# Patient Record
Sex: FEMALE | Race: WHITE | NOT HISPANIC OR LATINO
[De-identification: names, ages, dates, MRNs, and addresses within clinical notes are randomized per-mention and may not be internally consistent; named-entity substitution may affect disease eponyms.]

---

## 2019-04-01 ENCOUNTER — APPOINTMENT (OUTPATIENT)
Dept: NEUROSURGERY | Facility: CLINIC | Age: 64
End: 2019-04-01
Payer: COMMERCIAL

## 2019-04-01 VITALS — DIASTOLIC BLOOD PRESSURE: 99 MMHG | HEART RATE: 112 BPM | SYSTOLIC BLOOD PRESSURE: 162 MMHG

## 2019-04-01 VITALS
WEIGHT: 143 LBS | TEMPERATURE: 98.1 F | OXYGEN SATURATION: 93 % | HEART RATE: 103 BPM | SYSTOLIC BLOOD PRESSURE: 176 MMHG | HEIGHT: 64 IN | BODY MASS INDEX: 24.41 KG/M2 | DIASTOLIC BLOOD PRESSURE: 96 MMHG | RESPIRATION RATE: 16 BRPM

## 2019-04-01 DIAGNOSIS — Z87.891 PERSONAL HISTORY OF NICOTINE DEPENDENCE: ICD-10-CM

## 2019-04-01 DIAGNOSIS — Z85.118 PERSONAL HISTORY OF OTHER MALIGNANT NEOPLASM OF BRONCHUS AND LUNG: ICD-10-CM

## 2019-04-01 DIAGNOSIS — Z87.09 PERSONAL HISTORY OF OTHER DISEASES OF THE RESPIRATORY SYSTEM: ICD-10-CM

## 2019-04-01 PROCEDURE — 99205 OFFICE O/P NEW HI 60 MIN: CPT

## 2019-04-01 RX ORDER — FLUTICASONE FUROATE AND VILANTEROL TRIFENATATE 100; 25 UG/1; UG/1
100-25 POWDER RESPIRATORY (INHALATION)
Refills: 0 | Status: ACTIVE | COMMUNITY

## 2019-04-09 NOTE — HISTORY OF PRESENT ILLNESS
[de-identified] : 64 yo right handed female presents to the office for a neurosurgical consultation for an incidental cerebral aneurysm seen on recent MRI imaging. Pt has PMH of smoking 1/12-2 packs of cigarettes a day. PMH COPD and emphysema, Hep C with moderate cirrhosis, \par In May 2018 she was diagnosed with stage II lung cancer which was treated with chemo included 4 cycles, On May 7 2018, she underwent a right upper lobectomy by Dr. KEREN Clayton. Dr. Bray oncologist Joonjosefa\par In Feb 2019, pt had recent Chest ct which was good as stated by pt. \par June 2018 MRI brain was performed to r/o metastatic disease showed an incidental left MCA aneurysm measuring approximately 10 mm. \par PSH tonsillectomy at age 28.\par Allergies Penicillin\par Pt denies headaches, vision changes, motor and sensory disturbance. \par Pt denies family history of cerebral aneurysms.\par \par Plan: Formal cerebral angiogram Dr. Moreira\par Disk given to Adriana (MRI brain)  [FreeTextEntry1] : Incidental cerebral aneurysm

## 2019-04-09 NOTE — ASSESSMENT
[FreeTextEntry1] : 2019\par \par \par \par Re:	Bree Doss \par :	1955\par \par The patient is a 63-year-old right-handed female who one year ago was seeing her pulmonologist at [facility] secondary to her long history of smoking and COPD, and she had a lung screening CT scan which led to a PET scan.  This led to the finding of a right upper lobe mass.  She underwent a right thoracotomy in May 2018 at [facility] by Dr. Clayton, and this mass was removed.  Eighteen lymph nodes were negative.  She went on to be treated with 4 cycles of chemotherapy.  Recent CT scan of the chest from a month ago was clean.  She has returned to work \par 3 weeks after the thoracotomy and is quite active.  In 2018, one month after the thoracotomy, her oncologist at [facility] suggested that she have an MRI brain with and without IV contrast to look for metastatic disease.  This showed some ischemic change consistent with age and an approximately 10 mm left MCA aneurysm.  It is extremely apparent on the T2 axial images on the MRI.\par \par The patient’s past medical history is positive for an old history of hepatitis C with moderate cirrhosis.  She has hypertension and COPD.  Past surgical history is positive for the lung surgery, left wrist surgery, and tonsillectomy.  She is allergic to penicillin.  Family history is negative.  She is on medication for hypertension and a baby aspirin.  She was a 2-pack-per-day smoker for many years and quit at the time of the thoracotomy and she does not drink.  She is a .  She drives to work every day and is quite active.  Review of systems is negative.\par \par IMPRESSION: I am treating this patient as if her lung cancer is under control and she is oncologically stable.  She is 63 years old with a greater than 10 mm MCA aneurysm (I measured it at 11.6 mm) in the dominant sylvian fissure.  Prior to decision making, I am going to forgo a CTA or MRA and go directly to cerebral angiography to completely characterize this lesion, and I have contacted my colleague, Dr. Jaguar Moreira, to perform this study.  I explained to the patient she will most probably be a candidate for treatment, either open or endovascular.  I await the angiogram.  \par \par \par \par Yo Foss M.D., F.A.C.S.\par [facility]\par \par

## 2019-04-09 NOTE — PHYSICAL EXAM
[General Appearance - Alert] : alert [General Appearance - In No Acute Distress] : in no acute distress [Oriented To Time, Place, And Person] : oriented to person, place, and time [Impaired Insight] : insight and judgment were intact [Affect] : the affect was normal [Person] : oriented to person [Place] : oriented to place [Time] : oriented to time [Short Term Intact] : short term memory intact [Remote Intact] : remote memory intact [Span Intact] : the attention span was normal [Concentration Intact] : normal concentrating ability [Fluency] : fluency intact [Comprehension] : comprehension intact [Current Events] : adequate knowledge of current events [Past History] : adequate knowledge of personal past history [Vocabulary] : adequate range of vocabulary [Cranial Nerves Oculomotor (III)] : extraocular motion intact [Cranial Nerves Trigeminal (V)] : facial sensation intact symmetrically [Cranial Nerves Facial (VII)] : face symmetrical [Cranial Nerves Vestibulocochlear (VIII)] : hearing was intact bilaterally [Cranial Nerves Glossopharyngeal (IX)] : tongue and palate midline [Cranial Nerves Accessory (XI - Cranial And Spinal)] : head turning and shoulder shrug symmetric [Cranial Nerves Hypoglossal (XII)] : there was no tongue deviation with protrusion [Motor Tone] : muscle tone was normal in all four extremities [Motor Strength] : muscle strength was normal in all four extremities [No Muscle Atrophy] : normal bulk in all four extremities [Motor Handedness Right-Handed] : the patient is right hand dominant [Sensation Tactile Decrease] : light touch was intact [Balance] : balance was intact [Extraocular Movements] : extraocular movements were intact [Outer Ear] : the ears and nose were normal in appearance [Neck Appearance] : the appearance of the neck was normal [] : no respiratory distress [Respiration, Rhythm And Depth] : normal respiratory rhythm and effort [Exaggerated Use Of Accessory Muscles For Inspiration] : no accessory muscle use [Heart Rate And Rhythm] : heart rate was normal and rhythm regular [Abnormal Walk] : normal gait [Involuntary Movements] : no involuntary movements were seen [Skin Color & Pigmentation] : normal skin color and pigmentation [Skin Turgor] : normal skin turgor [Limited Balance] : balance was intact [Romberg's Sign] : Romberg's sign was negtive [Past-pointing] : there was no past-pointing [Tremor] : no tremor present [Coordination - Dysmetria Impaired Finger-to-Nose Bilateral] : not present [FreeTextEntry6] : no pronator drift

## 2019-04-15 ENCOUNTER — OUTPATIENT (OUTPATIENT)
Dept: OUTPATIENT SERVICES | Facility: HOSPITAL | Age: 64
LOS: 1 days | End: 2019-04-15
Payer: COMMERCIAL

## 2019-04-15 ENCOUNTER — APPOINTMENT (OUTPATIENT)
Dept: NEUROSURGERY | Facility: CLINIC | Age: 64
End: 2019-04-15
Payer: COMMERCIAL

## 2019-04-15 VITALS
HEART RATE: 93 BPM | SYSTOLIC BLOOD PRESSURE: 156 MMHG | HEIGHT: 64 IN | OXYGEN SATURATION: 98 % | RESPIRATION RATE: 15 BRPM | DIASTOLIC BLOOD PRESSURE: 92 MMHG | WEIGHT: 145.06 LBS | TEMPERATURE: 98 F

## 2019-04-15 VITALS
BODY MASS INDEX: 24.41 KG/M2 | DIASTOLIC BLOOD PRESSURE: 92 MMHG | SYSTOLIC BLOOD PRESSURE: 156 MMHG | OXYGEN SATURATION: 98 % | TEMPERATURE: 97.9 F | HEIGHT: 64 IN | RESPIRATION RATE: 17 BRPM | WEIGHT: 143 LBS | HEART RATE: 93 BPM

## 2019-04-15 DIAGNOSIS — I67.1 CEREBRAL ANEURYSM, NONRUPTURED: ICD-10-CM

## 2019-04-15 DIAGNOSIS — Z90.89 ACQUIRED ABSENCE OF OTHER ORGANS: Chronic | ICD-10-CM

## 2019-04-15 DIAGNOSIS — K74.60 UNSPECIFIED CIRRHOSIS OF LIVER: ICD-10-CM

## 2019-04-15 DIAGNOSIS — Z01.818 ENCOUNTER FOR OTHER PREPROCEDURAL EXAMINATION: ICD-10-CM

## 2019-04-15 DIAGNOSIS — Z90.2 ACQUIRED ABSENCE OF LUNG [PART OF]: Chronic | ICD-10-CM

## 2019-04-15 LAB
ALBUMIN SERPL ELPH-MCNC: 4.8 G/DL — SIGNIFICANT CHANGE UP (ref 3.3–5)
ALP SERPL-CCNC: 105 U/L — SIGNIFICANT CHANGE UP (ref 40–120)
ALT FLD-CCNC: 17 U/L — SIGNIFICANT CHANGE UP (ref 10–45)
ANION GAP SERPL CALC-SCNC: 15 MMOL/L — SIGNIFICANT CHANGE UP (ref 5–17)
APTT BLD: 28.9 SEC — SIGNIFICANT CHANGE UP (ref 27.5–36.3)
AST SERPL-CCNC: 20 U/L — SIGNIFICANT CHANGE UP (ref 10–40)
BILIRUB SERPL-MCNC: 0.2 MG/DL — SIGNIFICANT CHANGE UP (ref 0.2–1.2)
BLD GP AB SCN SERPL QL: NEGATIVE — SIGNIFICANT CHANGE UP
BUN SERPL-MCNC: 25 MG/DL — HIGH (ref 7–23)
CALCIUM SERPL-MCNC: 10.1 MG/DL — SIGNIFICANT CHANGE UP (ref 8.4–10.5)
CHLORIDE SERPL-SCNC: 105 MMOL/L — SIGNIFICANT CHANGE UP (ref 96–108)
CO2 SERPL-SCNC: 21 MMOL/L — LOW (ref 22–31)
CREAT SERPL-MCNC: 0.77 MG/DL — SIGNIFICANT CHANGE UP (ref 0.5–1.3)
GLUCOSE SERPL-MCNC: 95 MG/DL — SIGNIFICANT CHANGE UP (ref 70–99)
HCT VFR BLD CALC: 45.3 % — HIGH (ref 34.5–45)
HGB BLD-MCNC: 15.1 G/DL — SIGNIFICANT CHANGE UP (ref 11.5–15.5)
INR BLD: 0.93 RATIO — SIGNIFICANT CHANGE UP (ref 0.88–1.16)
MCHC RBC-ENTMCNC: 28.6 PG — SIGNIFICANT CHANGE UP (ref 27–34)
MCHC RBC-ENTMCNC: 33.3 GM/DL — SIGNIFICANT CHANGE UP (ref 32–36)
MCV RBC AUTO: 85.8 FL — SIGNIFICANT CHANGE UP (ref 80–100)
PLATELET # BLD AUTO: 305 K/UL — SIGNIFICANT CHANGE UP (ref 150–400)
POTASSIUM SERPL-MCNC: 3.8 MMOL/L — SIGNIFICANT CHANGE UP (ref 3.5–5.3)
POTASSIUM SERPL-SCNC: 3.8 MMOL/L — SIGNIFICANT CHANGE UP (ref 3.5–5.3)
PROT SERPL-MCNC: 7.6 G/DL — SIGNIFICANT CHANGE UP (ref 6–8.3)
PROTHROM AB SERPL-ACNC: 10.5 SEC — SIGNIFICANT CHANGE UP (ref 10–13.1)
RBC # BLD: 5.28 M/UL — HIGH (ref 3.8–5.2)
RBC # FLD: 13.9 % — SIGNIFICANT CHANGE UP (ref 10.3–14.5)
RH IG SCN BLD-IMP: POSITIVE — SIGNIFICANT CHANGE UP
SODIUM SERPL-SCNC: 141 MMOL/L — SIGNIFICANT CHANGE UP (ref 135–145)
WBC # BLD: 10.15 K/UL — SIGNIFICANT CHANGE UP (ref 3.8–10.5)
WBC # FLD AUTO: 10.15 K/UL — SIGNIFICANT CHANGE UP (ref 3.8–10.5)

## 2019-04-15 PROCEDURE — 86900 BLOOD TYPING SEROLOGIC ABO: CPT

## 2019-04-15 PROCEDURE — 99213 OFFICE O/P EST LOW 20 MIN: CPT

## 2019-04-15 PROCEDURE — 86850 RBC ANTIBODY SCREEN: CPT

## 2019-04-15 PROCEDURE — 80053 COMPREHEN METABOLIC PANEL: CPT

## 2019-04-15 PROCEDURE — 85610 PROTHROMBIN TIME: CPT

## 2019-04-15 PROCEDURE — 86901 BLOOD TYPING SEROLOGIC RH(D): CPT

## 2019-04-15 PROCEDURE — 85027 COMPLETE CBC AUTOMATED: CPT

## 2019-04-15 PROCEDURE — G0463: CPT

## 2019-04-15 PROCEDURE — 85730 THROMBOPLASTIN TIME PARTIAL: CPT

## 2019-04-15 NOTE — H&P PST ADULT - NSICDXPASTMEDICALHX_GEN_ALL_CORE_FT
PAST MEDICAL HISTORY:  Cerebral aneurysm left MCA    COPD with emphysema     Hepatitis C in remission for 12 years. Moderate damage done to Liver.      HTN (hypertension)     Liver cirrhosis     Lung cancer s/p chemo last summer, s/p RUL lobectomy 5/2018 at [facility] with Dr. Clayton

## 2019-04-15 NOTE — HISTORY OF PRESENT ILLNESS
[de-identified] : Bree Doss is a 63yr old female here for a visit prior to her diagnostic cerebral angiogram which will be done by Dr. Moreira 4/17/2019. In June 2018, one month after undergoing thoracotomy for lung mass, her oncologist at [facility] suggested that she have an MRI brain with and without IV contrast to look for metastatic disease. This MRI showed a cerebral aneurysm approximately 10 mm left MCA aneurysm. She has no family history of cerebral aneurysm. She has social history positive for formerly smoking. She was evaluated by Dr. Foss in the outpatient office 4/1/2019 and Dr. Moreira and him discussed proceeding with diagnostic cerebral angiography to fully map out the aneurysm.  Today she feels well denies any new motor/sensory/speech/visual abnormalities. \par

## 2019-04-15 NOTE — H&P PST ADULT - NSICDXPROBLEM_GEN_ALL_CORE_FT
PROBLEM DIAGNOSES  Problem: Cerebral aneurysm  Assessment and Plan: Scheduled cerebral angiogram   will c/w all medications as prescribed   inhalers will be used the day of procedure      Problem: Liver cirrhosis  Assessment and Plan: CMP, coags ordered (could not find any recent lab work)

## 2019-04-15 NOTE — ASSESSMENT
[FreeTextEntry1] : Impression: 63yr old female with 10mm lmca aneurysm on MR \par \par Plan: \par Discussed with patient and her son the details of the diagnostic cerebral angiogram procedure. Reviewed the risks and benefits of the procedure. Patient verbalized understanding and wishes to proceed with diagnostic cerebral angiogram 4/17/2019 with Dr. Jaguar Moreira. \par Educated patient and son on the signs and symptoms of subarachnoid hemorrhage and need to seek medical attention immediately if she were to experience the worst headache of her life.\par Discussed the goal of the diagnostic cerebral angiogram is to fully map out the aneurysm shape size and location so that we can better know how to potential treat the aneurysm.

## 2019-04-15 NOTE — PHYSICAL EXAM
[General Appearance - Alert] : alert [General Appearance - In No Acute Distress] : in no acute distress [Place] : oriented to place [Person] : oriented to person [Cranial Nerves Optic (II)] : visual acuity intact bilaterally,  pupils equal round and reactive to light [Cranial Nerves Oculomotor (III)] : extraocular motion intact [Time] : oriented to time [Cranial Nerves Glossopharyngeal (IX)] : tongue and palate midline [Cranial Nerves Trigeminal (V)] : facial sensation intact symmetrically [Cranial Nerves Facial (VII)] : face symmetrical [Cranial Nerves Vestibulocochlear (VIII)] : hearing was intact bilaterally [Cranial Nerves Accessory (XI - Cranial And Spinal)] : head turning and shoulder shrug symmetric [Cranial Nerves Hypoglossal (XII)] : there was no tongue deviation with protrusion [Involuntary Movements] : no involuntary movements were seen [Motor Strength] : muscle strength was normal in all four extremities [Abnormal Walk] : normal gait [] : no respiratory distress

## 2019-04-15 NOTE — H&P PST ADULT - ACTIVITY
feels SOB with humid weather; since she had her wedge resection strenuous activity will make her SOB

## 2019-04-16 PROBLEM — C34.90 MALIGNANT NEOPLASM OF UNSPECIFIED PART OF UNSPECIFIED BRONCHUS OR LUNG: Chronic | Status: ACTIVE | Noted: 2019-04-15

## 2019-04-17 ENCOUNTER — APPOINTMENT (OUTPATIENT)
Dept: NEUROSURGERY | Facility: HOSPITAL | Age: 64
End: 2019-04-17
Payer: COMMERCIAL

## 2019-04-17 ENCOUNTER — OUTPATIENT (OUTPATIENT)
Dept: OUTPATIENT SERVICES | Facility: HOSPITAL | Age: 64
LOS: 1 days | End: 2019-04-17
Payer: COMMERCIAL

## 2019-04-17 DIAGNOSIS — Z90.2 ACQUIRED ABSENCE OF LUNG [PART OF]: Chronic | ICD-10-CM

## 2019-04-17 DIAGNOSIS — I67.1 CEREBRAL ANEURYSM, NONRUPTURED: ICD-10-CM

## 2019-04-17 DIAGNOSIS — Z90.89 ACQUIRED ABSENCE OF OTHER ORGANS: Chronic | ICD-10-CM

## 2019-04-17 PROBLEM — I10 ESSENTIAL (PRIMARY) HYPERTENSION: Chronic | Status: ACTIVE | Noted: 2019-04-15

## 2019-04-17 PROCEDURE — C1894: CPT

## 2019-04-17 PROCEDURE — 36227 PLACE CATH XTRNL CAROTID: CPT | Mod: 50

## 2019-04-17 PROCEDURE — 36226 PLACE CATH VERTEBRAL ART: CPT

## 2019-04-17 PROCEDURE — 36226 PLACE CATH VERTEBRAL ART: CPT | Mod: 50

## 2019-04-17 PROCEDURE — 36227 PLACE CATH XTRNL CAROTID: CPT

## 2019-04-17 PROCEDURE — 76377 3D RENDER W/INTRP POSTPROCES: CPT | Mod: 26

## 2019-04-17 PROCEDURE — 36224 PLACE CATH CAROTD ART: CPT

## 2019-04-17 PROCEDURE — 36224 PLACE CATH CAROTD ART: CPT | Mod: 50

## 2019-04-17 PROCEDURE — C1769: CPT

## 2019-04-17 PROCEDURE — C1887: CPT

## 2019-04-17 RX ORDER — SODIUM CHLORIDE 9 MG/ML
1000 INJECTION INTRAMUSCULAR; INTRAVENOUS; SUBCUTANEOUS
Qty: 0 | Refills: 0 | Status: DISCONTINUED | OUTPATIENT
Start: 2019-04-17 | End: 2019-05-02

## 2019-04-17 NOTE — CHART NOTE - NSCHARTNOTEFT_GEN_A_CORE
Interventional Neuro Radiology  Pre-Procedure Note PA-C    This is a 63 year old right hand dominant female            Allergies: penicillins (Rash)  PMHX: left MCA aneurysm , hypertension, cirrhosis, Lung cancer: s/p chemo last summer, Hepatitis C, COPD with emphysema  PSHX: right upper lobectomy of lung 2018   Social History: former smoker quit 8 years ago   FAMILY HISTORY:  Current Medications:     CBC                        15.1   10.15 )-----------( 305      ( 15 Apr 2019 20:56 )             45.3       BMP    141  |  105  |  25<H>  ----------------------------<  95  3.8   |  21<L>  |  0.77      Blood Bank: 0 positive available       Assessment/Plan:   This is a 63 year old right hand dominant female    Procedure, goals, risks, benefits and alternatives were discussed with patient and patient's family. All questions were answered.  Risks include but are not limited to stroke, vessel injury, hemorrhage, and or right  groin hematoma.  Patient demonstrates understanding  of all risks involved with this procedure and wishes to continue. Appropriate content was obtained from patient and consent is in the patient's chart. Interventional Neuro Radiology  Pre-Procedure Note PA-C    This is a 63 year old right hand dominant female with a past medical history significant for hypertension, COPD, lung cancer s/post right upper lobectomy in 2018, HCV, cirrhosis with a 10mm left MCA aneurysm.    Allergies: penicillins (Rash)  PMHX: left MCA aneurysm , hypertension, cirrhosis, Lung cancer: s/p chemo last summer, Hepatitis C, COPD with emphysema  PSHX: right upper lobectomy of lung 2018   Social History: former smoker quit 8 years ago   FAMILY HISTORY:  Current Medications:     CBC                        15.1   10.15 )-----------( 305      ( 15 Apr 2019 20:56 )             45.3       BMP    141  |  105  |  25<H>  ----------------------------<  95  3.8   |  21<L>  |  0.77      Blood Bank: 0 positive available       Assessment/Plan:   This is a 63 year old right hand dominant female with a metastatic   Procedure, goals, risks, benefits and alternatives were discussed with patient and patient's family. All questions were answered.  Risks include but are not limited to stroke, vessel injury, hemorrhage, and or right  groin hematoma.  Patient demonstrates understanding  of all risks involved with this procedure and wishes to continue. Appropriate content was obtained from patient and consent is in the patient's chart. Interventional Neuro Radiology  Pre-Procedure Note PA-C    This is a 63 year old right hand dominant female with a past medical history significant for hypertension, COPD, lung cancer s/post right upper lobectomy in 2018, HCV, cirrhosis with a 10mm left MCA aneurysm.    Allergies: penicillins (Rash)  PMHX: left MCA aneurysm , hypertension, cirrhosis, Lung cancer: s/p chemo last summer, Hepatitis C, COPD with emphysema  PSHX: right upper lobectomy of lung 2018   Social History: former smoker quit 8 years ago   FAMILY HISTORY: non-contributory       CBC                        15.1   10.15 )-----------( 305      ( 15 Apr 2019 20:56 )             45.3       BMP    141  |  105  |  25<H>  ----------------------------<  95  3.8   |  21<L>  |  0.77      Blood Bank: 0 positive available       Assessment/Plan:   This is a 63 year old right hand dominant female with a metastatic work up which revealed a left MCA aneurysm, patient presents to Neuro IR for a selective cerebral angiography to monitor aneurysm. Procedure, goals, risks, benefits and alternatives were discussed with patient and patient's family. All questions were answered.  Risks include but are not limited to stroke, vessel injury, hemorrhage, and or right  groin hematoma.  Patient demonstrates understanding  of all risks involved with this procedure and wishes to continue. Appropriate content was obtained from patient and consent is in the patient's chart.

## 2019-04-17 NOTE — CHART NOTE - NSCHARTNOTEFT_GEN_A_CORE
Interventional Neuro- Radiology   Procedure Note PA-C    Procedure: Selective Cerebral Angiography   Pre- Procedure Diagnosis:  Post- Procedure Diagnosis:    : Dr. Jaguar Moreira  Resident:  Dr Uriel Sharma         Physician Assistant: HEATHER Meza PA-C    Nurse:  Radiologic tech:  Anesthesiologist:  Sheath:    I/Os: EBL less than 10cc  IV fluids:     cc     Urine output     cc        Contrast Omnipaque 240      cc             Vitals: BP         HR      Spo2        Preliminary Report:  Using a 4 Fr short/long sheath to the right groin under MAC sedation via   left vertebral artery,  left common carotid artery, left external carotid artery, right vertebral artery,  right common carotid artery, right external carotid artery  a selective cerebral angiography was performed and  demonstrated                      Official note to follow).  Patient tolerated procedure well, hemodynamically stable, no change in neurological status compared to baseline.  Results discussed with neurosurgery, patient and patient's  family.  Right groin sheath was removed, manual compression held to hemostasis for 20 minutes, no active bleeding, no hematoma, quick clot and safeguard balloon dressing applied at _____h. Interventional Neuro- Radiology   Procedure Note PA-C    Procedure: Selective Cerebral Angiography   Pre- Procedure Diagnosis:  left MCA aneurysm   Post- Procedure Diagnosis: left MCA aneurysm 10mm with a 2.49mm neck     : Dr. Jaguar Moreira  Resident:  Dr Gregg Sharma                        Physician Assistant: HEATHER Meza PA-C    Nurse:                     Nya Tran RN                          Radiologic tech:   Wes Poe LRT       Anum Whittington LRT               Anesthesiologist:  Dr Miah Goldstein                      Sheath:                5 Slovak long sheath     I/Os: EBL less than 10cc  IV fluids: 100cc  Urine output due to void Contrast Omnipaque 240   87cc            Vitals: /77        HR 71     Spo2  100%       Preliminary Report:  Using a 5 Slovak long sheath to the right groin under MAC sedation via left vertebral artery, left internal carotid artery, left external carotid artery, right vertebral artery, right internal carotid artery, right external carotid artery a selective cerebral angiography was performed and demonstrated a left MCA measuring approximately 10mm, with a 2.49 neck. Official note to follow.  Patient tolerated procedure well, hemodynamically stable, no change in neurological status compared to baseline. Results discussed with  patient and patient's . Right groin sheath was removed, manual compression held to hemostasis for 20 minutes, no active bleeding, no hematoma, quick clot and safeguard balloon dressing applied at 11:30am. Interventional Neuro- Radiology   Procedure Note PA-C    Procedure: Selective Cerebral Angiography   Pre- Procedure Diagnosis:  left MCA aneurysm   Post- Procedure Diagnosis: left MCA aneurysm 10mm with a 2.49mm neck     : Dr. Jaguar Moreira  Resident:  Dr Gregg Sharma                        Physician Assistant: HEATHER Meza PA-C    Nurse:                  Nya rTan RN                          Radiologic tech:   Wes Poe LRT        Anum Whittington LRT               Anesthesiologist:  Dr Miah Goldstein                      Sheath:                5 Montenegrin long sheath     I/Os: EBL less than 10cc  IV fluids: 100cc  Urine output due to void Contrast Omnipaque 240   87cc            Vitals: /77        HR 71     Spo2  100%       Preliminary Report:  Using a 5 Montenegrin long sheath to the right groin under MAC sedation via left vertebral artery, left internal carotid artery, left external carotid artery, right vertebral artery, right internal carotid artery, right external carotid artery a selective cerebral angiography was performed and demonstrated a left MCA measuring approximately 10mm, with a 2.49 neck. Official note to follow.  Patient tolerated procedure well, hemodynamically stable, no change in neurological status compared to baseline. Results discussed with  patient and patient's . Right groin sheath was removed, manual compression held to hemostasis for 20 minutes, no active bleeding, no hematoma, quick clot and safeguard balloon dressing applied at 11:30am. Interventional Neuro- Radiology   Procedure Note PA-C    Procedure: Selective Cerebral Angiography   Pre- Procedure Diagnosis:  left MCA aneurysm   Post- Procedure Diagnosis: left MCA aneurysm 10mm with a 2.49mm neck     : Dr. Jaguar Moreira  Resident:  Dr Gregg Sharma                        Physician Assistant: HEATHER Meza PA-C    Nurse:                  Nya Tran RN                          Radiologic tech:   Wes Poe LRT         Anum Whittington LRT               Anesthesiologist:  Dr Miah Anton CRNA                     Sheath:                5 Italian long sheath     I/Os: EBL less than 10cc  IV fluids: 100cc  Urine output due to void Contrast Omni 240  87cc            Vitals: /77        HR 71     Spo2  100%       Preliminary Report:  Using a 5 Italian long sheath to the right groin under MAC sedation via left vertebral artery, left internal carotid artery, left external carotid artery, right vertebral artery, right internal carotid artery, right external carotid artery a selective cerebral angiography was performed and demonstrated a left MCA measuring approximately 10mm, with a 2.49 neck. Official note to follow.  Patient tolerated procedure well, hemodynamically stable, no change in neurological status compared to baseline. Results discussed with  patient and patient's . Right groin sheath was removed, manual compression held to hemostasis for 20 minutes, no active bleeding, no hematoma, quick clot and safeguard balloon dressing applied at 11:30am.

## 2019-04-23 DIAGNOSIS — I67.1 CEREBRAL ANEURYSM, NONRUPTURED: ICD-10-CM

## 2019-05-09 ENCOUNTER — APPOINTMENT (OUTPATIENT)
Dept: NEUROSURGERY | Facility: CLINIC | Age: 64
End: 2019-05-09
Payer: COMMERCIAL

## 2019-05-09 VITALS
TEMPERATURE: 97.9 F | OXYGEN SATURATION: 93 % | SYSTOLIC BLOOD PRESSURE: 154 MMHG | WEIGHT: 143 LBS | HEART RATE: 93 BPM | RESPIRATION RATE: 17 BRPM | HEIGHT: 64 IN | BODY MASS INDEX: 24.41 KG/M2 | DIASTOLIC BLOOD PRESSURE: 95 MMHG

## 2019-05-09 PROCEDURE — 99213 OFFICE O/P EST LOW 20 MIN: CPT

## 2019-05-09 NOTE — PHYSICAL EXAM
[General Appearance - Alert] : alert [Place] : oriented to place [General Appearance - In No Acute Distress] : in no acute distress [Person] : oriented to person [Time] : oriented to time [Cranial Nerves Optic (II)] : visual acuity intact bilaterally,  pupils equal round and reactive to light [Cranial Nerves Oculomotor (III)] : extraocular motion intact [Cranial Nerves Trigeminal (V)] : facial sensation intact symmetrically [Cranial Nerves Facial (VII)] : face symmetrical [Cranial Nerves Glossopharyngeal (IX)] : tongue and palate midline [Cranial Nerves Hypoglossal (XII)] : there was no tongue deviation with protrusion [Cranial Nerves Accessory (XI - Cranial And Spinal)] : head turning and shoulder shrug symmetric [Cranial Nerves Vestibulocochlear (VIII)] : hearing was intact bilaterally [Involuntary Movements] : no involuntary movements were seen [Motor Strength] : muscle strength was normal in all four extremities [] : no respiratory distress [Exaggerated Use Of Accessory Muscles For Inspiration] : no accessory muscle use [Abnormal Walk] : normal gait

## 2019-05-09 NOTE — REASON FOR VISIT
[Follow-Up: _____] : a [unfilled] follow-up visit [Spouse] : spouse [FreeTextEntry1] : Bree Doss is a 63yr old female here for a follow up visit after undergoing diagnostic cerebral angiography 4/17/2019.  The angiogram was significant for a 9mm left middle cerebral artery aneurysm unruptured. She tolerated the procedure well and is here to discuss treatment options. Today she feels well has no complaints. \par \par PCP: Dr. Jonas\par \par HPI: Bree Doss is a 63yr old female here for a visit prior to her diagnostic cerebral angiogram which will be done by Dr. Moreira 4/17/2019. In June 2018, one month after undergoing thoracotomy for lung mass, her oncologist at [facility] suggested that she have an MRI brain with and without IV contrast to look for metastatic disease. This MRI showed a cerebral aneurysm approximately 10 mm left MCA aneurysm. She has no family history of cerebral aneurysm. She has social history positive for formerly smoking. She was evaluated by Dr. Foss in the outpatient office 4/1/2019 and Dr. Moreira and him discussed proceeding with diagnostic cerebral angiography to fully map out the aneurysm. Today she feels well denies any new motor/sensory/speech/visual abnormalities.

## 2019-05-09 NOTE — ASSESSMENT
[FreeTextEntry1] : Impression: 63yr old female with 9mm left middle cerebral artery aneurysm \par \par Plan: \par Discussed results of the diagnostic cerebral angiogram which confirms the presence of a 9mm LMCA aneurysm \par Discussed treatment options such as endovascular embolization with the WEB device \par WIll start patient on aspirin 325mg and Plavix 75mg daily 6 days prior to the embolization procedure\par The risks, benefits, alternatives, complications and personnel associated with the procedure were discussed with the patient and the family in great detail.  They request that we proceed.\par Medical and cardiac clearance prior to the procedure

## 2019-05-09 NOTE — CONSULT LETTER
[Dear  ___] : Dear  [unfilled], [DrJourdan  ___] : Dr. ALFREDO [Consult Letter:] : I had the pleasure of evaluating your patient, [unfilled]. [Consult Closing:] : Thank you very much for allowing me to participate in the care of this patient.  If you have any questions, please do not hesitate to contact me. [FreeTextEntry1] : As you know she is a 63yr old female with a past medical history of COPD, Hep C with moderate cirrhosis, and lung cancer.  She underwent MRI of the brain to evaluate for metastatic disease and it was significant for an unruptured 10mm left middle cerebral artery aneurysm. Her social history is significant for former smoker and her family history consists of parents having a stroke but unsure if subarachnoid hemorrhage was present. She underwent diagnostic cerebral angiography 4/17/2019 which confirmed the presence of a 9mm left middle cerebral artery aneurysm. Due to the size and location of this aneurysm I recommend treatment.  I discussed endovascular treatment of the aneurysm with the WEB device and potential a stent. The risks, benefits, alternatives, complications and personnel associated with the procedure were discussed with the patient and the family in great detail.  They request that we proceed.  She will be started on dual antiplatelet therapy six days prior to the embolization procedure and PRU level will be drawn the day of the procedure. \par  [FreeTextEntry3] : \par Sincerely,\par \par Jaguar Moreira MD\par Professor of Neurological Surgery and Radiology\par  Department of Neurosurgery\par Director Cerebrovascular Surgery\par [facility] [employer] at [facility]\par

## 2019-06-13 ENCOUNTER — OUTPATIENT (OUTPATIENT)
Dept: OUTPATIENT SERVICES | Facility: HOSPITAL | Age: 64
LOS: 1 days | End: 2019-06-13
Payer: COMMERCIAL

## 2019-06-13 VITALS
RESPIRATION RATE: 16 BRPM | OXYGEN SATURATION: 98 % | HEART RATE: 88 BPM | SYSTOLIC BLOOD PRESSURE: 127 MMHG | DIASTOLIC BLOOD PRESSURE: 77 MMHG | HEIGHT: 64 IN | TEMPERATURE: 98 F | WEIGHT: 151.9 LBS

## 2019-06-13 DIAGNOSIS — Z87.39 PERSONAL HISTORY OF OTHER DISEASES OF THE MUSCULOSKELETAL SYSTEM AND CONNECTIVE TISSUE: Chronic | ICD-10-CM

## 2019-06-13 DIAGNOSIS — I67.1 CEREBRAL ANEURYSM, NONRUPTURED: ICD-10-CM

## 2019-06-13 DIAGNOSIS — Z98.890 OTHER SPECIFIED POSTPROCEDURAL STATES: Chronic | ICD-10-CM

## 2019-06-13 DIAGNOSIS — Z90.89 ACQUIRED ABSENCE OF OTHER ORGANS: Chronic | ICD-10-CM

## 2019-06-13 DIAGNOSIS — Z01.818 ENCOUNTER FOR OTHER PREPROCEDURAL EXAMINATION: ICD-10-CM

## 2019-06-13 DIAGNOSIS — Z90.2 ACQUIRED ABSENCE OF LUNG [PART OF]: Chronic | ICD-10-CM

## 2019-06-13 LAB
ANION GAP SERPL CALC-SCNC: 20 MMOL/L — HIGH (ref 5–17)
BLD GP AB SCN SERPL QL: NEGATIVE — SIGNIFICANT CHANGE UP
BUN SERPL-MCNC: 16 MG/DL — SIGNIFICANT CHANGE UP (ref 7–23)
CALCIUM SERPL-MCNC: 10.1 MG/DL — SIGNIFICANT CHANGE UP (ref 8.4–10.5)
CHLORIDE SERPL-SCNC: 97 MMOL/L — SIGNIFICANT CHANGE UP (ref 96–108)
CO2 SERPL-SCNC: 22 MMOL/L — SIGNIFICANT CHANGE UP (ref 22–31)
CREAT SERPL-MCNC: 0.9 MG/DL — SIGNIFICANT CHANGE UP (ref 0.5–1.3)
GLUCOSE SERPL-MCNC: 171 MG/DL — HIGH (ref 70–99)
POTASSIUM SERPL-MCNC: 3.3 MMOL/L — LOW (ref 3.5–5.3)
POTASSIUM SERPL-SCNC: 3.3 MMOL/L — LOW (ref 3.5–5.3)
RH IG SCN BLD-IMP: POSITIVE — SIGNIFICANT CHANGE UP
SODIUM SERPL-SCNC: 139 MMOL/L — SIGNIFICANT CHANGE UP (ref 135–145)

## 2019-06-13 PROCEDURE — 86900 BLOOD TYPING SEROLOGIC ABO: CPT

## 2019-06-13 PROCEDURE — G0463: CPT

## 2019-06-13 PROCEDURE — 86850 RBC ANTIBODY SCREEN: CPT

## 2019-06-13 PROCEDURE — 80048 BASIC METABOLIC PNL TOTAL CA: CPT

## 2019-06-13 PROCEDURE — 85027 COMPLETE CBC AUTOMATED: CPT

## 2019-06-13 PROCEDURE — 86901 BLOOD TYPING SEROLOGIC RH(D): CPT

## 2019-06-13 NOTE — H&P PST ADULT - OTHER CARE PROVIDERS
Dr. Osorio (pulm) [phone]   Dr. Bray (oncology) [phone] cardiologist Dr. Bolanos [phone] has appointment on 9/17/19 Dr. Osorio (pulm) [phone]   Dr. Bray (oncology) [phone] cardiologist Dr. Bolanos [phone] has appointment on 6/17/19

## 2019-06-13 NOTE — H&P PST ADULT - NSICDXPASTSURGICALHX_GEN_ALL_CORE_FT
PAST SURGICAL HISTORY:  H/O ganglion cyst left wrist 24 yr ago    History of eye surgery as a baby , cyst under eyelid    History of liver biopsy 20 yr ago    S/P lobectomy of lung right upper lobe  5/2018    S/P tonsillectomy

## 2019-06-13 NOTE — H&P PST ADULT - NSICDXPASTMEDICALHX_GEN_ALL_CORE_FT
PAST MEDICAL HISTORY:  Cerebral aneurysm left MCA    COPD with emphysema     Hepatitis C in remission for 12 years. Moderate damage done to Liver.      HTN (hypertension)     Liver cirrhosis     Lung cancer s/p chemo last summer, s/p RUL lobectomy 5/2018 at [place] with Dr. Clayton PAST MEDICAL HISTORY:  Cerebral aneurysm left MCA 9mm    COPD with emphysema stable, denies hosptializations, ER visits    Hepatitis C in remission for 12 years. Moderate damage done to Liver.      HTN (hypertension)     Liver cirrhosis stable    Lung cancer s/p chemo last summer, s/p RUL lobectomy 5/2018 at [place] with Dr. Clayton PAST MEDICAL HISTORY:  Cerebral aneurysm left MCA 9mm  s/p cerebral angio 4/2019    COPD with emphysema stable, denies hospitalizations/ ER visits    Hepatitis C in remission for 12 years. Moderate damage done to Liver.      HTN (hypertension)     Liver cirrhosis stable    Lung cancer s/p chemo last summer, s/p RUL lobectomy 5/2018 at [place] with Dr. Clayton

## 2019-06-13 NOTE — H&P PST ADULT - NSICDXPROBLEM_GEN_ALL_CORE_FT
PROBLEM DIAGNOSES  Problem: Cerebral aneurysm  Assessment and Plan: cerebral embolization with web device  PST instructions provided, patient verbalized understanding.   CBC, BMP, T&S collected and sent.   As per Dr. Moreira, patient will start Plavix 6 days prior procedure , will continue Aspirin. PROBLEM DIAGNOSES  Problem: Cerebral aneurysm  Assessment and Plan: cerebral embolization with web device  PST instructions provided, patient verbalized understanding.   CBC, BMP, T&S collected and sent.   As per Dr. Moreira, patient will start Plavix 6 days prior procedure , will increase Aspirin to 325mg daily.

## 2019-06-13 NOTE — H&P PST ADULT - NEGATIVE CARDIOVASCULAR SYMPTOMS
no chest pain/no paroxysmal nocturnal dyspnea/no peripheral edema/no dyspnea on exertion/no claudication/no orthopnea/no palpitations

## 2019-06-13 NOTE — H&P PST ADULT - HISTORY OF PRESENT ILLNESS
64 y/o female with pmhx of HTN, COPD, lung CA s/p RUL lobectomy in 5/2018 with chemo from June-August 2018, HCV treated but now with liver cirrhosis, s/p f/u MRI for metastatic disease which found a 10mm left MCA aneurysm.   Pt is asymptomatic. Now presents for cerebral angiogram. 62 y/o female with pmhx of HTN, COPD, lung CA s/p RUL lobectomy in 5/2018 with chemo from June-August 2018, HCV treated but now with liver cirrhosis, s/p f/u MRI for metastatic disease which found a 9mm left MCA aneurysm, underwent diagnostic  cerebral angiogram 4/17/19, presents to [facility] for scheduled cerebral embolization with web device on 6/21/19. Denies headache, vision changes,  neuro deficits, no fever, chills, no acute complaints. Accompanied by son .     Patient advised by Dr. Moreira to start Plavix 6 days prior procedure, will continue Aspirin. 62 y/o female with pmhx of HTN, COPD, lung CA s/p RUL lobectomy in 5/2018 with chemo from June-August 2018, HCV treated but now with liver cirrhosis, s/p f/u MRI for metastatic disease which found a 9mm left MCA aneurysm, underwent diagnostic  cerebral angiogram 4/17/19, presents to [facility] for scheduled cerebral embolization with web device on 6/21/19. Denies headache, vision changes,  neuro deficits, no fever, chills, no acute complaints. Accompanied by son .     Patient advised by Dr. Moreira to start Plavix 6 days prior procedure, will continue Aspirin, but increase to 325mg daily.

## 2019-06-14 LAB
HCT VFR BLD CALC: 47.4 % — HIGH (ref 34.5–45)
HGB BLD-MCNC: 15.5 G/DL — SIGNIFICANT CHANGE UP (ref 11.5–15.5)
MCHC RBC-ENTMCNC: 29.5 PG — SIGNIFICANT CHANGE UP (ref 27–34)
MCHC RBC-ENTMCNC: 32.7 GM/DL — SIGNIFICANT CHANGE UP (ref 32–36)
MCV RBC AUTO: 90.1 FL — SIGNIFICANT CHANGE UP (ref 80–100)
PLATELET # BLD AUTO: 327 K/UL — SIGNIFICANT CHANGE UP (ref 150–400)
RBC # BLD: 5.26 M/UL — HIGH (ref 3.8–5.2)
RBC # FLD: 13.9 % — SIGNIFICANT CHANGE UP (ref 10.3–14.5)
WBC # BLD: 10.46 K/UL — SIGNIFICANT CHANGE UP (ref 3.8–10.5)
WBC # FLD AUTO: 10.46 K/UL — SIGNIFICANT CHANGE UP (ref 3.8–10.5)

## 2019-06-21 ENCOUNTER — TRANSCRIPTION ENCOUNTER (OUTPATIENT)
Age: 64
End: 2019-06-21

## 2019-06-21 ENCOUNTER — APPOINTMENT (OUTPATIENT)
Dept: NEUROSURGERY | Facility: HOSPITAL | Age: 64
End: 2019-06-21
Payer: COMMERCIAL

## 2019-06-21 ENCOUNTER — INPATIENT (INPATIENT)
Facility: HOSPITAL | Age: 64
LOS: 2 days | Discharge: HOME | DRG: 26 | End: 2019-06-24
Attending: NEUROLOGICAL SURGERY | Admitting: NEUROLOGICAL SURGERY
Payer: COMMERCIAL

## 2019-06-21 VITALS
RESPIRATION RATE: 12 BRPM | OXYGEN SATURATION: 96 % | WEIGHT: 162.7 LBS | TEMPERATURE: 98 F | HEART RATE: 93 BPM | HEIGHT: 64 IN

## 2019-06-21 DIAGNOSIS — Z90.2 ACQUIRED ABSENCE OF LUNG [PART OF]: Chronic | ICD-10-CM

## 2019-06-21 DIAGNOSIS — Z98.890 OTHER SPECIFIED POSTPROCEDURAL STATES: Chronic | ICD-10-CM

## 2019-06-21 DIAGNOSIS — I67.1 CEREBRAL ANEURYSM, NONRUPTURED: ICD-10-CM

## 2019-06-21 DIAGNOSIS — Z87.39 PERSONAL HISTORY OF OTHER DISEASES OF THE MUSCULOSKELETAL SYSTEM AND CONNECTIVE TISSUE: Chronic | ICD-10-CM

## 2019-06-21 DIAGNOSIS — Z90.89 ACQUIRED ABSENCE OF OTHER ORGANS: Chronic | ICD-10-CM

## 2019-06-21 LAB
ANION GAP SERPL CALC-SCNC: 14 MMOL/L — SIGNIFICANT CHANGE UP (ref 5–17)
APTT BLD: 29.9 SEC — SIGNIFICANT CHANGE UP (ref 27.5–36.3)
BUN SERPL-MCNC: 16 MG/DL — SIGNIFICANT CHANGE UP (ref 7–23)
CALCIUM SERPL-MCNC: 8.7 MG/DL — SIGNIFICANT CHANGE UP (ref 8.4–10.5)
CHLORIDE SERPL-SCNC: 106 MMOL/L — SIGNIFICANT CHANGE UP (ref 96–108)
CO2 SERPL-SCNC: 19 MMOL/L — LOW (ref 22–31)
CREAT SERPL-MCNC: 0.74 MG/DL — SIGNIFICANT CHANGE UP (ref 0.5–1.3)
GLUCOSE SERPL-MCNC: 162 MG/DL — HIGH (ref 70–99)
HCT VFR BLD CALC: 39.5 % — SIGNIFICANT CHANGE UP (ref 34.5–45)
HGB BLD-MCNC: 14.1 G/DL — SIGNIFICANT CHANGE UP (ref 11.5–15.5)
INR BLD: 1.04 RATIO — SIGNIFICANT CHANGE UP (ref 0.88–1.16)
MAGNESIUM SERPL-MCNC: 2 MG/DL — SIGNIFICANT CHANGE UP (ref 1.6–2.6)
MCHC RBC-ENTMCNC: 31.5 PG — SIGNIFICANT CHANGE UP (ref 27–34)
MCHC RBC-ENTMCNC: 35.6 GM/DL — SIGNIFICANT CHANGE UP (ref 32–36)
MCV RBC AUTO: 88.3 FL — SIGNIFICANT CHANGE UP (ref 80–100)
PA ADP PRP-ACNC: 34 PRU — LOW (ref 194–417)
PHOSPHATE SERPL-MCNC: 3.6 MG/DL — SIGNIFICANT CHANGE UP (ref 2.5–4.5)
PLATELET # BLD AUTO: 234 K/UL — SIGNIFICANT CHANGE UP (ref 150–400)
POTASSIUM SERPL-MCNC: 4.1 MMOL/L — SIGNIFICANT CHANGE UP (ref 3.5–5.3)
POTASSIUM SERPL-SCNC: 4.1 MMOL/L — SIGNIFICANT CHANGE UP (ref 3.5–5.3)
PROTHROM AB SERPL-ACNC: 11.9 SEC — SIGNIFICANT CHANGE UP (ref 10–12.9)
RBC # BLD: 4.47 M/UL — SIGNIFICANT CHANGE UP (ref 3.8–5.2)
RBC # FLD: 12.9 % — SIGNIFICANT CHANGE UP (ref 10.3–14.5)
SODIUM SERPL-SCNC: 139 MMOL/L — SIGNIFICANT CHANGE UP (ref 135–145)
WBC # BLD: 6.6 K/UL — SIGNIFICANT CHANGE UP (ref 3.8–10.5)
WBC # FLD AUTO: 6.6 K/UL — SIGNIFICANT CHANGE UP (ref 3.8–10.5)

## 2019-06-21 PROCEDURE — 70496 CT ANGIOGRAPHY HEAD: CPT | Mod: 26

## 2019-06-21 PROCEDURE — 61624 TCAT PERM OCCLS/EMBOLJ CNS: CPT

## 2019-06-21 PROCEDURE — 93970 EXTREMITY STUDY: CPT | Mod: 26

## 2019-06-21 PROCEDURE — 36228 PLACE CATH INTRACRANIAL ART: CPT | Mod: LT

## 2019-06-21 PROCEDURE — 36224 PLACE CATH CAROTD ART: CPT | Mod: LT

## 2019-06-21 PROCEDURE — 99291 CRITICAL CARE FIRST HOUR: CPT

## 2019-06-21 PROCEDURE — 75894 X-RAYS TRANSCATH THERAPY: CPT | Mod: 26

## 2019-06-21 PROCEDURE — 75898 FOLLOW-UP ANGIOGRAPHY: CPT | Mod: 26

## 2019-06-21 PROCEDURE — 99292 CRITICAL CARE ADDL 30 MIN: CPT

## 2019-06-21 RX ORDER — DOCUSATE SODIUM 100 MG
100 CAPSULE ORAL
Refills: 0 | Status: DISCONTINUED | OUTPATIENT
Start: 2019-06-21 | End: 2019-06-24

## 2019-06-21 RX ORDER — IPRATROPIUM/ALBUTEROL SULFATE 18-103MCG
3 AEROSOL WITH ADAPTER (GRAM) INHALATION EVERY 6 HOURS
Refills: 0 | Status: DISCONTINUED | OUTPATIENT
Start: 2019-06-21 | End: 2019-06-24

## 2019-06-21 RX ORDER — SENNA PLUS 8.6 MG/1
1 TABLET ORAL DAILY
Refills: 0 | Status: DISCONTINUED | OUTPATIENT
Start: 2019-06-21 | End: 2019-06-24

## 2019-06-21 RX ORDER — ASPIRIN/CALCIUM CARB/MAGNESIUM 324 MG
325 TABLET ORAL DAILY
Refills: 0 | Status: DISCONTINUED | OUTPATIENT
Start: 2019-06-22 | End: 2019-06-24

## 2019-06-21 RX ORDER — CHLORHEXIDINE GLUCONATE 213 G/1000ML
1 SOLUTION TOPICAL
Refills: 0 | Status: DISCONTINUED | OUTPATIENT
Start: 2019-06-21 | End: 2019-06-24

## 2019-06-21 RX ORDER — SODIUM CHLORIDE 9 MG/ML
1000 INJECTION INTRAMUSCULAR; INTRAVENOUS; SUBCUTANEOUS
Refills: 0 | Status: DISCONTINUED | OUTPATIENT
Start: 2019-06-21 | End: 2019-06-22

## 2019-06-21 RX ORDER — ACETAMINOPHEN 500 MG
650 TABLET ORAL EVERY 6 HOURS
Refills: 0 | Status: DISCONTINUED | OUTPATIENT
Start: 2019-06-21 | End: 2019-06-24

## 2019-06-21 RX ADMIN — CHLORHEXIDINE GLUCONATE 1 APPLICATION(S): 213 SOLUTION TOPICAL at 22:12

## 2019-06-21 NOTE — PROGRESS NOTE ADULT - SUBJECTIVE AND OBJECTIVE BOX
HPI: 64 yo female hx of known L MCA aneurysm, COPD, lobectomy, presents for elective angiography/WEB embolization of L MCA aneurysm.    On admission, the patient was:  GCS: 15  Hunt-Quiñones:  modified Beck:  ICH score:  NIHSS:    VITALS:  Reviewed.    LABS:  Post-op pending.    IMAGING:   Recent imaging studies were reviewed.    MEDICATIONS:  acetaminophen   Tablet .. 650 milliGRAM(s) Oral every 6 hours PRN  ALBUTerol/ipratropium for Nebulization 3 milliLiter(s) Nebulizer every 6 hours PRN  chlorhexidine 4% Liquid 1 Application(s) Topical <User Schedule>  docusate sodium 100 milliGRAM(s) Oral two times a day  senna 1 Tablet(s) Oral daily  sodium chloride 0.9%. 1000 milliLiter(s) IV Continuous <Continuous>    EXAMINATION:  General:  calm  HEENT:  MMM  Neuro:  awake, alert, oriented x 3, follows commands, moves all extremities  Cards:  RRR  Respiratory:  no respiratory distress  Adomen:  soft  Extremities:  no edema, old R groin site ecchymosis  Skin:  warm/dry

## 2019-06-21 NOTE — CHART NOTE - NSCHARTNOTEFT_GEN_A_CORE
Interventional Neuro- Radiology   Procedure Note PA-C    Procedure: Selective Cerebral Angiography   Pre- Procedure Diagnosis: Left middle cerebral artery aneurysm   Post- Procedure Diagnosis:    : Dr. Jaguar Moreira MD  Fellow: Dr. Prachi Fontenot  Resident: Dr. Uriel Ko  Physician Assistant: Viry Forbes    RN: Essence    Anesthesia: Dr. Deven Washington (general anesthesia)    Sheath: 5 Trinidadian fabuki                                                                                                                                                                                                                                                                                                                                                                                                                                             I/Os:  Estimated blood loss: less than 10cc        IV fluids:     cc     Urine output     cc        Contrast Omnipaque 240      cc         Antibiotics:    Vitals:  BP: 99/57    HR: 62      Spo2: 98%     Preliminary Report:    Using a 5 Trinidadian fabuki sheath to the right groin under MAC sedation via   left vertebral artery,  left common carotid artery, left external carotid artery, right vertebral artery,  right common carotid artery, right external carotid artery  a selective cerebral angiography was performed and  demonstrates ________. ( Official note to follow).  Patient tolerated procedure well, hemodynamically stable, no change in neurological status compared to baseline.  Results discussed with neurosurgery, patient and patient's  family.  Groin sheath was removed,  manual compression held to hemostasis  for  21 minutes, no active bleeding, no hematoma,   quick clot and safeguard balloon dressing applied at _____h.  STAT labs:  CBC BMP  ____h.  Patient transfered to Recovery Room Interventional Neuro- Radiology   Procedure Note PA-C    Procedure: Selective Cerebral Angiography   Pre- Procedure Diagnosis: Left middle cerebral artery aneurysm   Post- Procedure Diagnosis: Embolization of left middle cerebral artery aneurysm with WEB device    : Dr. Jaguar Moreira  Fellow: Dr. Prachi Fontenot  Resident: Dr. Uriel Ko  Physician Assistant: Viry Forbes  RN: Essence  Anesthesia: Dr. Sam Washington (general anesthesia)    Sheath: 5 [place]oran fabuki                                                                                                                                                                                                                                                                                                                                                                                                                                             I/Os:  Estimated blood loss: less than 10cc        IV fluids:     cc     Urine output     cc        Contrast Omnipaque 240      cc           Antibiotics: Clindamycin  Meds: Heparin 4000 units    Vitals:  BP: 99/57    HR: 62      Spo2: 98%     Preliminary Report:  Using a 5 [place]an fabuki sheath to the right groin under general anesthesia via left internal carotid artery a selective cerebral angiogram with deployment of WEB device into the left middle cerebral artery aneurysm was performed and  demonstrates embolization of the aneurysm. ( Official note to follow).  Patient tolerated procedure well, hemodynamically stable, no change in neurological status compared to baseline.  Results discussed with patient and patient's  family.  Groin sheath was removed,  manual compression held to hemostasis  for  21 minutes, star close device deployed,  quick clot and safeguard balloon dressing applied at 1700h.  Patient transferred to Neurosurgical Intensive Care Unit Interventional Neuro- Radiology   Procedure Note PA-C    Procedure: Selective Cerebral Angiography   Pre- Procedure Diagnosis: Left middle cerebral artery aneurysm   Post- Procedure Diagnosis: Embolization of left middle cerebral artery aneurysm with WEB device    : Dr. Jaguar Moreira  Fellow: Dr. Prachi Fontenot  Resident: Dr. Uriel Ko  Physician Assistant: Viry Forbes  RN: Essence  Anesthesia: Dr. Sam Washington (general anesthesia)    Sheath: 5 Beninese fabuki                                                                                                                                                                                                                                                                                                                                                                                                                                             I/Os:  Estimated blood loss: less than 10cc        IV fluids: 700cc     Urine output: 800cc        Contrast Omnipaque 240: 169cc           Antibiotics: Clindamycin  Meds: Heparin 4000 units    Vitals:  BP: 99/57    HR: 62      Spo2: 98%     Preliminary Report:  Using a 5 Beninese fabuki sheath to the right groin under general anesthesia via left internal carotid artery a selective cerebral angiogram with deployment of WEB device into the left middle cerebral artery aneurysm was performed and  demonstrates embolization of the aneurysm. ( Official note to follow).  Patient tolerated procedure well, hemodynamically stable, no change in neurological status compared to baseline.  Results discussed with patient and patient's  family.  Groin sheath was removed,  manual compression held to hemostasis  for  21 minutes, star close device deployed,  quick clot and safeguard balloon dressing applied at 1700h.  Patient transferred to Neurosurgical Intensive Care Unit

## 2019-06-21 NOTE — CHART NOTE - NSCHARTNOTEFT_GEN_A_CORE
CAPRINI SCORE [CLOT] Score on Admission for     AGE RELATED RISK FACTORS                                                       MOBILITY RELATED FACTORS  [ ] Age 41-60 years                                            (1 Point)                  [ ] Bed rest                                                        (1 Point)  [ x] Age: 61-74 years                                           (2 Points)                 [ ] Plaster cast                                                   (2 Points)  [ ] Age= 75 years                                              (3 Points)                 [ ] Bed bound for more than 72 hours                 (2 Points)    DISEASE RELATED RISK FACTORS                                               GENDER SPECIFIC FACTORS  [ ] Edema in the lower extremities                       (1 Point)                  [ ] Pregnancy                                                     (1 Point)  [ ] Varicose veins                                               (1 Point)                  [ ] Post-partum < 6 weeks                                   (1 Point)             [ ] BMI > 25 Kg/m2                                            (1 Point)                  [ ] Hormonal therapy  or oral contraception          (1 Point)                 [ ] Sepsis (in the previous month)                        (1 Point)                  [ ] History of pregnancy complications                 (1 point)  [ ] Pneumonia or serious lung disease                                               [ ] Unexplained or recurrent                     (1 Point)           (in the previous month)                               (1 Point)  [ ] Abnormal pulmonary function test                     (1 Point)                 SURGERY RELATED RISK FACTORS (include planned surgeries)  [ ] Acute myocardial infarction                              (1 Point)                 [ ]  Section                                             (1 Point)  [ ] Congestive heart failure (in the previous month)  (1 Point)               [ ] Minor surgery                                                  (1 Point)   [ ] Inflammatory bowel disease                             (1 Point)                 [ ] Arthroscopic surgery                                        (2 Points)  [ ] Central venous access                                      (2 Points)                [x ] General surgery lasting more than 45 minutes   (2 Points)       [ ] Stroke (in the previous month)                          (5 Points)               [ ] Elective arthroplasty                                         (5 Points)            [ x] Current or past malignancy                                (2 Points)                                                                                                     HEMATOLOGY RELATED FACTORS                                                 TRAUMA RELATED RISK FACTORS  [ ] Prior episodes of VTE                                     (3 Points)                [ ] Fracture of the hip, pelvis, or leg                       (5 Points)  [ ] Positive family history for VTE                         (3 Points)                 [ ] Acute spinal cord injury (in the previous month)  (5 Points)  [ ] Prothrombin 55954 A                                     (3 Points)                 [ ] Paralysis  (less than 1 month)                             (5 Points)  [ ] Factor V Leiden                                             (3 Points)                  [ ] Multiple Trauma within 1 month                        (5 Points)  [ ] Lupus anticoagulants                                     (3 Points)                                                           [ ] Anticardiolipin antibodies                               (3 Points)                                                       [ ] High homocysteine in the blood                      (3 Points)                                             [ ] Other congenital or acquired thrombophilia      (3 Points)                                                [ ] Heparin induced thrombocytopenia                  (3 Points)                                          Total Score [    6      ]    Risk:  Very low 0   Low 1 to 2   Moderate 3 to 4   High =5       VTE Prophylasix Recommednations:  [x ] mechanical pneumatic compression devices                                      [ ] contraindicated: _____________________  [ ] chemo prophylasix                                                                                   [x ] contraindicated __patient is day 0 s/p angio embo of LMCA aneurysm___________________    **** HIGH LIKELIHOOD DVT PRESENT ON ADMISSION  [ x] (please order LE dopplers within 24 hours of admission)

## 2019-06-21 NOTE — DISCHARGE NOTE NURSING/CASE MANAGEMENT/SOCIAL WORK - NSDCPEEMAIL_GEN_ALL_CORE
[facility] for Tobacco Control email [email or web address]

## 2019-06-21 NOTE — DISCHARGE NOTE NURSING/CASE MANAGEMENT/SOCIAL WORK - NSDCDPATPORTLINK_GEN_ALL_CORE
You can access the [facility] Portal, offered by [facility], by registering with the following website: [email or web address]

## 2019-06-21 NOTE — CHART NOTE - NSCHARTNOTEFT_GEN_A_CORE
Interventional Neuro Radiology  Pre-Procedure Note    This is a 64yo right hand dominant female with pmhx of HTN, COPD, lung CA s/p RUL lobectomy in 5/2018 with chemo from June-August 2018, HCV treated but now with liver cirrhosis, s/p f/u MRI for metastatic disease which found a 9mm left MCA aneurysm, underwent diagnostic  cerebral angiogram 4/17/19. Patient presents today to neuro IR for selective cerebral angiography and embolization of aneurysm with WEB device.      Neuro Exam: Awake and alert, oriented x3, fluent, normal naming and repetition, follows 3 step commands. Extraocular movements intact, no nystagmus, visual fields full, face symmetric, tongue midline. No drift, 5/5 power x 4 extremities. Normal sensation to LT. Normal finger-to-nose and rapid alternating movements.    PAST MEDICAL & SURGICAL HISTORY:  Cerebral aneurysm: left MCA 9mm  s/p diagnostic cerebral angio 4/2019  Liver cirrhosis: stable  HTN (hypertension)  Lung cancer: s/p chemo last summer, s/p RUL lobectomy 5/2018 at [place] with Dr. Clayton  Hepatitis C: in remission for 12 years. Moderate damage done to Liver.  COPD with emphysema: stable, denies hospitalizations/ ER visits  History of eye surgery: as a baby, cyst under eyelid  H/O ganglion cyst: left wrist 24 yr ago  History of liver biopsy: 20 yr ago  S/P lobectomy of lung: right upper lobe  5/2018  S/P tonsillectomy    Allergies:   penicillins (Rash)    Current Medications:   · 	Breo Ellipta 100 mcg-25 mcg/inh inhalation powder: Last Dose Taken:  , 1 puff(s) inhaled once a day  · 	ProAir HFA 90 mcg/inh inhalation aerosol: Last Dose Taken:  , 2 puff(s) inhaled 4 times a day, As Needed  · 	Incruse Ellipta 62.5 mcg/inh inhalation powder: Last Dose Taken:  , 1 puff(s) inhaled once a day  · 	triamterene-hydrochlorothiazide 37.5 mg-25 mg oral tablet: Last Dose Taken:  , 1 tab(s) orally once a day  · 	aspirin 325 mg oral tablet: Last Dose Taken:  , 1 tab(s) orally once a day (at bedtime) will continue   · 	clopidogrel 75 mg oral tablet: Last Dose Taken:  , 1 tab(s) orally once a day, start 6 days prior procedure 6/15/19.    Labs:     Seen and reviewed in sunrise.     P2Y12= 34    Blood Bank: blood available     Assessment/Plan:   This is a 64yo female presents with left MCA aneurysm. Patient presents to neuro-IR for selective cerebral angiography and WEB embolization. Procedure/ risks/ benefits/ goals/ alternatives were explained. Risks include but are not limited to stroke/ vessel injury/ hemorrhage/ groin hematoma. All questions answered. Informed content obtained from patient. Consent placed in chart.    Viry Forbes PA-C  [phone]

## 2019-06-21 NOTE — PROGRESS NOTE ADULT - SUBJECTIVE AND OBJECTIVE BOX
s/p WEB for LMCA aneurysm; PMH HTN, COPD, lung CA s/p R lobectomy  post op r groin bleeding, pressure applied, re-dressed, neuroIR aware    Vitals/labs/meds reviewed  alert, fully oriented, EOMI, PERRL, FS, BUE no drift 5/5, LLE 5/5   R DF/PF 5/5;   R groin, no hematoma, +ecchymoses    ASA  MR/MRA in am  resume home COPD inhalers  -160  monitor R groin closely  regular diet  bowel regimen    additional critical care time 40min

## 2019-06-21 NOTE — DISCHARGE NOTE NURSING/CASE MANAGEMENT/SOCIAL WORK - NSDCPEWEB_GEN_ALL_CORE
[place] website --- [email or web address] Tobacco [email or web address] website --- [email or web address]

## 2019-06-22 PROBLEM — K74.60 UNSPECIFIED CIRRHOSIS OF LIVER: Chronic | Status: ACTIVE | Noted: 2019-04-15

## 2019-06-22 LAB
ANION GAP SERPL CALC-SCNC: 14 MMOL/L — SIGNIFICANT CHANGE UP (ref 5–17)
BUN SERPL-MCNC: 17 MG/DL — SIGNIFICANT CHANGE UP (ref 7–23)
CALCIUM SERPL-MCNC: 9.2 MG/DL — SIGNIFICANT CHANGE UP (ref 8.4–10.5)
CHLORIDE SERPL-SCNC: 106 MMOL/L — SIGNIFICANT CHANGE UP (ref 96–108)
CO2 SERPL-SCNC: 21 MMOL/L — LOW (ref 22–31)
CREAT SERPL-MCNC: 1.1 MG/DL — SIGNIFICANT CHANGE UP (ref 0.5–1.3)
GLUCOSE SERPL-MCNC: 130 MG/DL — HIGH (ref 70–99)
HCT VFR BLD CALC: 37.8 % — SIGNIFICANT CHANGE UP (ref 34.5–45)
HGB BLD-MCNC: 13.3 G/DL — SIGNIFICANT CHANGE UP (ref 11.5–15.5)
MAGNESIUM SERPL-MCNC: 2.3 MG/DL — SIGNIFICANT CHANGE UP (ref 1.6–2.6)
MCHC RBC-ENTMCNC: 31.2 PG — SIGNIFICANT CHANGE UP (ref 27–34)
MCHC RBC-ENTMCNC: 35.2 GM/DL — SIGNIFICANT CHANGE UP (ref 32–36)
MCV RBC AUTO: 88.6 FL — SIGNIFICANT CHANGE UP (ref 80–100)
PHOSPHATE SERPL-MCNC: 3.1 MG/DL — SIGNIFICANT CHANGE UP (ref 2.5–4.5)
PLATELET # BLD AUTO: 266 K/UL — SIGNIFICANT CHANGE UP (ref 150–400)
POTASSIUM SERPL-MCNC: 3.8 MMOL/L — SIGNIFICANT CHANGE UP (ref 3.5–5.3)
POTASSIUM SERPL-SCNC: 3.8 MMOL/L — SIGNIFICANT CHANGE UP (ref 3.5–5.3)
RBC # BLD: 4.27 M/UL — SIGNIFICANT CHANGE UP (ref 3.8–5.2)
RBC # FLD: 12.9 % — SIGNIFICANT CHANGE UP (ref 10.3–14.5)
SODIUM SERPL-SCNC: 141 MMOL/L — SIGNIFICANT CHANGE UP (ref 135–145)
WBC # BLD: 11.6 K/UL — HIGH (ref 3.8–10.5)
WBC # FLD AUTO: 11.6 K/UL — HIGH (ref 3.8–10.5)

## 2019-06-22 PROCEDURE — 70551 MRI BRAIN STEM W/O DYE: CPT | Mod: 26

## 2019-06-22 PROCEDURE — 99291 CRITICAL CARE FIRST HOUR: CPT

## 2019-06-22 RX ADMIN — Medication 100 MILLIGRAM(S): at 05:30

## 2019-06-22 RX ADMIN — SENNA PLUS 1 TABLET(S): 8.6 TABLET ORAL at 11:19

## 2019-06-22 RX ADMIN — Medication 100 MILLIGRAM(S): at 17:29

## 2019-06-22 RX ADMIN — CHLORHEXIDINE GLUCONATE 1 APPLICATION(S): 213 SOLUTION TOPICAL at 21:52

## 2019-06-22 RX ADMIN — Medication 650 MILLIGRAM(S): at 10:28

## 2019-06-22 RX ADMIN — Medication 650 MILLIGRAM(S): at 10:58

## 2019-06-22 RX ADMIN — Medication 325 MILLIGRAM(S): at 11:19

## 2019-06-22 NOTE — PROVIDER CONTACT NOTE (OTHER) - ASSESSMENT
Patient has difficulty forming sentences and finding words. Patient A/O x 4. No drifts on any extremities. Patient appears anxious.

## 2019-06-22 NOTE — PROGRESS NOTE ADULT - SUBJECTIVE AND OBJECTIVE BOX
HPI: 64 yo female s/p elective web embo of L MCA aneurysm    OVERNIGHT EVENTS:   ?R facial droop and word finding difficulty, transient, urgent MRI this AM    VITALS:  T(C): , Max: 36.7 (06-21-19 @ 19:00)  HR:  (69 - 96)  BP: --  ABP:  (105/45 - 145/62)  RR:  (11 - 21)  SpO2:  (92% - 98%)  Wt(kg): --      06-21-19 @ 07:01  -  06-22-19 @ 07:00  --------------------------------------------------------  IN: 1500 mL / OUT: 1440 mL / NET: 60 mL      LABS:  Na: 139 (06-21 @ 21:42)  K: 4.1 (06-21 @ 21:42)  Cl: 106 (06-21 @ 21:42)  CO2: 19 (06-21 @ 21:42)  BUN: 16 (06-21 @ 21:42)  Cr: 0.74 (06-21 @ 21:42)  Glu: 162(06-21 @ 21:42)    Hgb: 14.1 (06-21 @ 21:42)  Hct: 39.5 (06-21 @ 21:42)  WBC: 6.6 (06-21 @ 21:42)  Plt: 234 (06-21 @ 21:42)    INR: 1.04 06-21-19 @ 21:42  PTT: 29.9 06-21-19 @ 21:42    IMAGING:   Recent imaging studies were reviewed.    MEDICATIONS:  acetaminophen   Tablet .. 650 milliGRAM(s) Oral every 6 hours PRN  ALBUTerol/ipratropium for Nebulization 3 milliLiter(s) Nebulizer every 6 hours PRN  aspirin 325 milliGRAM(s) Oral daily  chlorhexidine 4% Liquid 1 Application(s) Topical <User Schedule>  docusate sodium 100 milliGRAM(s) Oral two times a day  senna 1 Tablet(s) Oral daily    EXAMINATION:  General:  calm  HEENT:  MMM  Neuro:  awake, alert, oriented x 3, follows commands, moves all extremities, mild expressive aphasia and slight clumsiness of fine motor movements in R hand  Cards:  RRR  Respiratory:  no respiratory distress  Adomen:  soft  Extremities:  no edema  Skin:  warm/dry

## 2019-06-22 NOTE — PROGRESS NOTE ADULT - SUBJECTIVE AND OBJECTIVE BOX
s/p WEB for LMCA aneurysm; PMH HTN, COPD, lung CA s/p R lobectomy  post op r groin bleeding, pressure applied, re-dressed, neuroIR aware    Vitals/labs/meds reviewed  alert, fully oriented, mild expressive aphasia, EOMI, PERRL, FS, BUE no drift 5/5, BLE 5/5   R groin, no hematoma, +ecchymoses    ASA  MR/MRA done  resume home COPD inhalers  -160  monitor R groin closely  regular diet  bowel regimen s/p WEB for LMCA aneurysm; PMH HTN, COPD, lung CA s/p R lobectomy  r groin no further bleeding    Vitals/labs/meds reviewed  alert, fully oriented, mild expressive aphasia, EOMI, PERRL, FS, BUE no drift 5/5, BLE 5/5   R groin, no hematoma, +ecchymoses    ASA  MR/MRA done  resume home COPD inhalers  -160  monitor R groin closely  regular diet  bowel regimen

## 2019-06-22 NOTE — PROGRESS NOTE ADULT - SUBJECTIVE AND OBJECTIVE BOX
Visit Summary: Patient seen and evaluated at bedside.    Overnight Events: none    Exam:  T(C): 36.6 (06-21-19 @ 23:00), Max: 36.7 (06-21-19 @ 19:00)  HR: 81 (06-21-19 @ 23:00) (69 - 93)  BP: --  RR: 13 (06-21-19 @ 23:00) (12 - 16)  SpO2: 98% (06-21-19 @ 23:00) (94% - 98%)  Wt(kg): --    AOx3, FC, PERRL, EOMI, no facial   5/5 throughout, no drift  SILT  no clonus    Groin CDI                        14.1   6.6   )-----------( 234      ( 21 Jun 2019 21:42 )             39.5     06-21    139  |  106  |  16  ----------------------------<  162<H>  4.1   |  19<L>  |  0.74    Ca    8.7      21 Jun 2019 21:42  Phos  3.6     06-21  Mg     2.0     06-21    PT/INR - ( 21 Jun 2019 21:42 )   PT: 11.9 sec;   INR: 1.04 ratio         PTT - ( 21 Jun 2019 21:42 )  PTT:29.9 sec

## 2019-06-23 LAB
ANION GAP SERPL CALC-SCNC: 15 MMOL/L — SIGNIFICANT CHANGE UP (ref 5–17)
BUN SERPL-MCNC: 20 MG/DL — SIGNIFICANT CHANGE UP (ref 7–23)
CALCIUM SERPL-MCNC: 9.8 MG/DL — SIGNIFICANT CHANGE UP (ref 8.4–10.5)
CHLORIDE SERPL-SCNC: 103 MMOL/L — SIGNIFICANT CHANGE UP (ref 96–108)
CO2 SERPL-SCNC: 22 MMOL/L — SIGNIFICANT CHANGE UP (ref 22–31)
CREAT SERPL-MCNC: 0.81 MG/DL — SIGNIFICANT CHANGE UP (ref 0.5–1.3)
GLUCOSE SERPL-MCNC: 116 MG/DL — HIGH (ref 70–99)
MAGNESIUM SERPL-MCNC: 2.3 MG/DL — SIGNIFICANT CHANGE UP (ref 1.6–2.6)
PHOSPHATE SERPL-MCNC: 3.5 MG/DL — SIGNIFICANT CHANGE UP (ref 2.5–4.5)
POTASSIUM SERPL-MCNC: 4.3 MMOL/L — SIGNIFICANT CHANGE UP (ref 3.5–5.3)
POTASSIUM SERPL-SCNC: 4.3 MMOL/L — SIGNIFICANT CHANGE UP (ref 3.5–5.3)
SODIUM SERPL-SCNC: 140 MMOL/L — SIGNIFICANT CHANGE UP (ref 135–145)

## 2019-06-23 PROCEDURE — 99233 SBSQ HOSP IP/OBS HIGH 50: CPT

## 2019-06-23 RX ORDER — POTASSIUM CHLORIDE 20 MEQ
40 PACKET (EA) ORAL ONCE
Refills: 0 | Status: COMPLETED | OUTPATIENT
Start: 2019-06-23 | End: 2019-06-23

## 2019-06-23 RX ORDER — ENOXAPARIN SODIUM 100 MG/ML
40 INJECTION SUBCUTANEOUS
Refills: 0 | Status: DISCONTINUED | OUTPATIENT
Start: 2019-06-23 | End: 2019-06-24

## 2019-06-23 RX ADMIN — Medication 100 MILLIGRAM(S): at 17:01

## 2019-06-23 RX ADMIN — CHLORHEXIDINE GLUCONATE 1 APPLICATION(S): 213 SOLUTION TOPICAL at 21:47

## 2019-06-23 RX ADMIN — Medication 40 MILLIEQUIVALENT(S): at 10:30

## 2019-06-23 RX ADMIN — SENNA PLUS 1 TABLET(S): 8.6 TABLET ORAL at 10:31

## 2019-06-23 RX ADMIN — ENOXAPARIN SODIUM 40 MILLIGRAM(S): 100 INJECTION SUBCUTANEOUS at 17:01

## 2019-06-23 RX ADMIN — Medication 100 MILLIGRAM(S): at 05:41

## 2019-06-23 RX ADMIN — Medication 325 MILLIGRAM(S): at 10:30

## 2019-06-23 NOTE — PROGRESS NOTE ADULT - SUBJECTIVE AND OBJECTIVE BOX
HPI: 64 yo female s/p WEB embolization of L MCA aneurysm    OVERNIGHT EVENTS:   No acute events overnight.    VITALS:  T(C): , Max: 37.6 (06-22-19 @ 23:00)  HR:  (65 - 103)  BP:  (122/71 - 152/80)  ABP:  (120/86 - 162/71)  RR:  (11 - 24)  SpO2:  (93% - 99%)  Wt(kg): --      06-22-19 @ 07:01  -  06-23-19 @ 07:00  --------------------------------------------------------  IN: 1320 mL / OUT: 2435 mL / NET: -1115 mL    06-23-19 @ 07:01  -  06-23-19 @ 10:05  --------------------------------------------------------  IN: 240 mL / OUT: 150 mL / NET: 90 mL      LABS:  Na: 141 (06-22 @ 22:17), 139 (06-21 @ 21:42)  K: 3.8 (06-22 @ 22:17), 4.1 (06-21 @ 21:42)  Cl: 106 (06-22 @ 22:17), 106 (06-21 @ 21:42)  CO2: 21 (06-22 @ 22:17), 19 (06-21 @ 21:42)  BUN: 17 (06-22 @ 22:17), 16 (06-21 @ 21:42)  Cr: 1.10 (06-22 @ 22:17), 0.74 (06-21 @ 21:42)  Glu: 130(06-22 @ 22:17), 162(06-21 @ 21:42)    Hgb: 13.3 (06-22 @ 22:17), 14.1 (06-21 @ 21:42)  Hct: 37.8 (06-22 @ 22:17), 39.5 (06-21 @ 21:42)  WBC: 11.6 (06-22 @ 22:17), 6.6 (06-21 @ 21:42)  Plt: 266 (06-22 @ 22:17), 234 (06-21 @ 21:42)    INR: 1.04 06-21-19 @ 21:42  PTT: 29.9 06-21-19 @ 21:42    IMAGING:   Recent imaging studies were reviewed.    MEDICATIONS:  acetaminophen   Tablet .. 650 milliGRAM(s) Oral every 6 hours PRN  ALBUTerol/ipratropium for Nebulization 3 milliLiter(s) Nebulizer every 6 hours PRN  aspirin 325 milliGRAM(s) Oral daily  chlorhexidine 4% Liquid 1 Application(s) Topical <User Schedule>  docusate sodium 100 milliGRAM(s) Oral two times a day  senna 1 Tablet(s) Oral daily    EXAMINATION:  General:  calm  HEENT:  MMM  Neuro:  awake, alert, oriented x 3, follows commands, moves all extremities, trace dysarthria and expressive aphasia much improved today  Cards:  RRR  Respiratory:  no respiratory distress  Adomen:  soft  Extremities:  no edema  Skin:  warm/dry

## 2019-06-23 NOTE — PROGRESS NOTE ADULT - SUBJECTIVE AND OBJECTIVE BOX
s/p WEB for LMCA aneurysm; PMH HTN, COPD, lung CA s/p R lobectomy  r groin no further bleeding    Vitals/labs/meds reviewed  alert, fully oriented, mild expressive aphasia improving, EOMI, PERRL, FS, BUE no drift 5/5, BLE 5/5     ASA  MR/MRA done  resume home COPD inhalers  -160  monitor R groin closely  regular diet  bowel regimen  pending transfer to floor

## 2019-06-23 NOTE — PHYSICAL THERAPY INITIAL EVALUATION ADULT - PLANNED THERAPY INTERVENTIONS, PT EVAL
balance training/stairs: GOAL: Pt will negotiate 9 steps of stairs using appropriate assistive device and 1 handrail via reciprocal/step-to technique indep in 2 weeks./gait training/bed mobility training

## 2019-06-23 NOTE — PHYSICAL THERAPY INITIAL EVALUATION ADULT - BALANCE TRAINING, PT EVAL
GOAL: Pt will demonstrate improved static/dynamic balance to normal in order to improve stability, decrease fall risk, and increase independence in ADLs within 2 weeks.

## 2019-06-23 NOTE — PROGRESS NOTE ADULT - SUBJECTIVE AND OBJECTIVE BOX
Visit Summary: Patient seen and evaluated at bedside.    Overnight Events: none    Exam:  T(C): 36.6 (06-21-19 @ 23:00), Max: 36.7 (06-21-19 @ 19:00)  HR: 81 (06-21-19 @ 23:00) (69 - 93)  BP: --  RR: 13 (06-21-19 @ 23:00) (12 - 16)  SpO2: 98% (06-21-19 @ 23:00) (94% - 98%)  Wt(kg): --    AOx3, FC, PERRL, EOMI, no facial, mild expressive aphasia  5/5 throughout, no drift  SILT  no clonus    Groin CDI                        14.1   6.6   )-----------( 234      ( 21 Jun 2019 21:42 )             39.5     06-21    139  |  106  |  16  ----------------------------<  162<H>  4.1   |  19<L>  |  0.74    Ca    8.7      21 Jun 2019 21:42  Phos  3.6     06-21  Mg     2.0     06-21    PT/INR - ( 21 Jun 2019 21:42 )   PT: 11.9 sec;   INR: 1.04 ratio         PTT - ( 21 Jun 2019 21:42 )  PTT:29.9 sec

## 2019-06-23 NOTE — PHYSICAL THERAPY INITIAL EVALUATION ADULT - ADDITIONAL COMMENTS
Pt lives in an apartment, 4 steps to enter, +handrail; 1 flight to 2nd floor, +handrail. Pt does not own any DMEs.    BLE DOPPLERS 6/21: No left lower extremity venous thrombosis; No venous thrombosis in the imaged segments of the right lower extremity venous system. MR OF HEAD 6/22:  Artifact from embolic material described above.

## 2019-06-23 NOTE — PHYSICAL THERAPY INITIAL EVALUATION ADULT - PERTINENT HX OF CURRENT PROBLEM, REHAB EVAL
63F with pmhx of HTN, COPD, lung CA s/p RUL lobectomy in 5/2018 with chemo from June-August 2018, HCV treated but now with liver cirrhosis, s/p f/u MRI for metastatic disease which found a 9mm left MCA aneurysm, underwent diagnostic cerebral angiogram. Denies headache, vision changes,  neuro deficits, no fever, chills, no acute complaints. S/P above procedure

## 2019-06-24 ENCOUNTER — TRANSCRIPTION ENCOUNTER (OUTPATIENT)
Age: 64
End: 2019-06-24

## 2019-06-24 VITALS — DIASTOLIC BLOOD PRESSURE: 75 MMHG | HEART RATE: 104 BPM | SYSTOLIC BLOOD PRESSURE: 151 MMHG | OXYGEN SATURATION: 94 %

## 2019-06-24 PROCEDURE — 99233 SBSQ HOSP IP/OBS HIGH 50: CPT

## 2019-06-24 PROCEDURE — 99239 HOSP IP/OBS DSCHRG MGMT >30: CPT

## 2019-06-24 RX ORDER — SENNA PLUS 8.6 MG/1
1 TABLET ORAL
Qty: 0 | Refills: 0 | DISCHARGE
Start: 2019-06-24

## 2019-06-24 RX ORDER — CLOPIDOGREL BISULFATE 75 MG/1
1 TABLET, FILM COATED ORAL
Qty: 0 | Refills: 0 | DISCHARGE

## 2019-06-24 RX ORDER — ASPIRIN/CALCIUM CARB/MAGNESIUM 324 MG
1 TABLET ORAL
Qty: 0 | Refills: 0 | DISCHARGE
Start: 2019-06-24

## 2019-06-24 RX ORDER — DOCUSATE SODIUM 100 MG
1 CAPSULE ORAL
Qty: 0 | Refills: 0 | DISCHARGE
Start: 2019-06-24

## 2019-06-24 RX ORDER — ACETAMINOPHEN 500 MG
2 TABLET ORAL
Qty: 0 | Refills: 0 | DISCHARGE
Start: 2019-06-24

## 2019-06-24 RX ORDER — ASPIRIN/CALCIUM CARB/MAGNESIUM 324 MG
1 TABLET ORAL
Qty: 0 | Refills: 0 | DISCHARGE

## 2019-06-24 RX ADMIN — Medication 325 MILLIGRAM(S): at 12:07

## 2019-06-24 RX ADMIN — SENNA PLUS 1 TABLET(S): 8.6 TABLET ORAL at 12:07

## 2019-06-24 RX ADMIN — Medication 100 MILLIGRAM(S): at 05:57

## 2019-06-24 NOTE — PROGRESS NOTE ADULT - ASSESSMENT
63F s/p WEB embolization of aneurysm    - -160  - Cont ASA  - Transfer to floor
63F s/p WEB embolization of aneurysm    - -160  - Cont ASA  - Transfer to floor
63F s/p WEB embolization of aneurysm  - MRI / MRA in AM  - -160  - Cont ASA  - q1hr neurochecks
Summary: 62 yo female s/p WEB embolization of L MCA aneurysm    NEURO:  q4h neuro checks    CARDS:  -160    PULM:  sat > 89%; home COPD meds    RENAL:  IVL    GASTRO:  regular diet  ---> Stress ulcer prophylaxis:  PPI    HEME:  monitor H/H;   ---> DVT prophylaxis: SCDs, SQL    ENDO:  euglycemia    ID:  afebrile    Code status:  Full code  Disposition:  Floor    Patient was not critically ill, but was medically complex.  30 minutes spent.
Summary: 64 yo female s/p WEB embolization of L MCA aneurysm    NEURO:  q1h neuro checks; CT brain in AM    CARDS:  -160    PULM:  sat > 89%; home COPD meds    RENAL:  IVL    GASTRO:  advance as tolerated  ---> Stress ulcer prophylaxis:  PPI    HEME:  monitor H/H;  now per NSG    ---> DVT prophylaxis: SCDs, hold anticoagulation, fresh post-op    ENDO:  euglycemia    ID:  afebrile    Code status:  Full code  Disposition:  ICU    This patient was at high risk of neurologic deterioration and/or death due to: aneurysm rupture    Time spent:  45 minutes
Summary: 64 yo female s/p WEB embolization of L MCA aneurysm    NEURO:  q1h neuro checks; stat MRI performed and pending    CARDS:  -160    PULM:  sat > 89%; home COPD meds    RENAL:  IVL    GASTRO:  regular diet  ---> Stress ulcer prophylaxis:  PPI    HEME:  monitor H/H;  now per NSG    ---> DVT prophylaxis: SCDs, hold anticoagulation, fresh post-op    ENDO:  euglycemia    ID:  afebrile    Code status:  Full code  Disposition:  ICU    This patient was at high risk of neurologic deterioration and/or death due to: aneurysm rupture    Time spent:  45 minutes
Summary: 64 yo female s/p WEB embolization of L MCA aneurysm    NEURO:  q4h neuro checks    CARDS:  -160    PULM:  sat > 89%; home COPD meds    RENAL:  IVL    GASTRO:  regular diet  ---> Stress ulcer prophylaxis:  PPI    HEME:  monitor H/H;   ---> DVT prophylaxis: SCDs, SQL tonight    ENDO:  euglycemia    ID:  afebrile    Code status:  Full code  Disposition:  Floor    Patient was not critically ill, but was medically complex.  30 minutes spent.

## 2019-06-24 NOTE — OCCUPATIONAL THERAPY INITIAL EVALUATION ADULT - PERTINENT HX OF CURRENT PROBLEM, REHAB EVAL
63F with 9mm left MCA aneurysm, underwent diagnostic  cerebral angiogram 4/17/19. Patient presents to neuro IR for selective cerebral angiography and embolization of aneurysm with WEB device.  Denies headache, vision changes, neuro deficits, no fever, chills, no acute complaints.

## 2019-06-24 NOTE — DISCHARGE NOTE PROVIDER - NSDCFUADDINST_GEN_ALL_CORE_FT
call for outpatient followup appt.  please do not engage in strenuous activity, heavy lifting, drive, or return to work or school until cleared by surgeon.   please notify physician if fevers, bleeding, swelling, pain not relieved by medication, lethargy, mental status changes, seizure activity, new bowel or bladder dysfunction, difficulty breathing, chest pain, new motor weakness, increased nausea or vomiting, inability to tolerate foods or liquids.   Shower:  please keep incision clean and dry, do not submerge wound in water for prolonged periods of time, pat dry after showering, and do not use any creams or ointments on incision.

## 2019-06-24 NOTE — OCCUPATIONAL THERAPY INITIAL EVALUATION ADULT - RANGE OF MOTION EXAMINATION, UPPER EXTREMITY
Left UE Passive ROM was WNL (within normal limits)/Right UE Active ROM was WNL (within normal limits)/Right UE Passive ROM was WNL (within normal limits)/Left UE Active ROM was WNL (within normal limits)

## 2019-06-24 NOTE — OCCUPATIONAL THERAPY INITIAL EVALUATION ADULT - RANGE OF MOTION EXAMINATION, LOWER EXTREMITY
Left LE Passive ROM was WNL (within normal limits)/Left LE Active ROM was WNL  (within normal limits)/Right LE Passive ROM was WNL (within normal limits)/Right LE Active ROM was WNL(within normal limits)

## 2019-06-24 NOTE — DISCHARGE NOTE PROVIDER - NSDCACTIVITY_GEN_ALL_CORE
Walking - Indoors allowed/Stairs allowed/Walking - Outdoors allowed/No heavy lifting/straining/Do not drive or operate machinery/Showering allowed

## 2019-06-24 NOTE — DISCHARGE NOTE PROVIDER - HOSPITAL COURSE
64 y/o female with pmhx of HTN, COPD, lung CA s/p RUL lobectomy in 5/2018 with chemo from June-August 2018, HCV treated but now with liver cirrhosis, s/p f/u MRI for metastatic disease which found a 9mm left MCA aneurysm, underwent diagnostic  cerebral angiogram 4/17/19, presents to PST for scheduled cerebral embolization with web device on 6/21/19. Denies headache, vision changes,  neuro deficits, no fever, chills, no acute complaints. Accompanied by son .         Patient advised by Dr. Moreira to start Plavix 6 days prior procedure, will continue Aspirin, but increase to 325mg daily.         adm. 6/21 for elective L mca web closure of aneurysm.  tolerated procedure well.  postop word finding difficulty and R hand clumsiness prompted MRI brain demonstrating Left frontal increased    signal on DWI sequence.  word finding difficulty steadily improving daily now with minimal word finding difficulty, easily naming multiple objects in sequence, demonstrating less frustration with verbal output.  seen by PT and deemed appropriate for outpatient PT.  stable for discharge home    on 6/24/19.

## 2019-06-24 NOTE — PROGRESS NOTE ADULT - SUBJECTIVE AND OBJECTIVE BOX
HPI:  s/p WEB embolization of LMCA aneurysm, elective    OVERNIGHT EVENTS:   No acute events overnight.    VITALS:  T(C): , Max: 37.1 (06-24-19 @ 07:00)  HR:  (68 - 87)  BP:  (126/64 - 151/73)  ABP: --  RR:  (11 - 21)  SpO2:  (94% - 98%)  Wt(kg): --      06-23-19 @ 07:01  -  06-24-19 @ 07:00  --------------------------------------------------------  IN: 1720 mL / OUT: 2300 mL / NET: -580 mL    06-24-19 @ 07:01  -  06-24-19 @ 10:27  --------------------------------------------------------  IN: 240 mL / OUT: 200 mL / NET: 40 mL      LABS:  Na: 140 (06-23 @ 20:58), 141 (06-22 @ 22:17), 139 (06-21 @ 21:42)  K: 4.3 (06-23 @ 20:58), 3.8 (06-22 @ 22:17), 4.1 (06-21 @ 21:42)  Cl: 103 (06-23 @ 20:58), 106 (06-22 @ 22:17), 106 (06-21 @ 21:42)  CO2: 22 (06-23 @ 20:58), 21 (06-22 @ 22:17), 19 (06-21 @ 21:42)  BUN: 20 (06-23 @ 20:58), 17 (06-22 @ 22:17), 16 (06-21 @ 21:42)  Cr: 0.81 (06-23 @ 20:58), 1.10 (06-22 @ 22:17), 0.74 (06-21 @ 21:42)  Glu: 116(06-23 @ 20:58), 130(06-22 @ 22:17), 162(06-21 @ 21:42)    Hgb: 13.3 (06-22 @ 22:17), 14.1 (06-21 @ 21:42)  Hct: 37.8 (06-22 @ 22:17), 39.5 (06-21 @ 21:42)  WBC: 11.6 (06-22 @ 22:17), 6.6 (06-21 @ 21:42)  Plt: 266 (06-22 @ 22:17), 234 (06-21 @ 21:42)    INR: 1.04 06-21-19 @ 21:42  PTT: 29.9 06-21-19 @ 21:42    IMAGING:   Recent imaging studies were reviewed.    MEDICATIONS:  acetaminophen   Tablet .. 650 milliGRAM(s) Oral every 6 hours PRN  ALBUTerol/ipratropium for Nebulization 3 milliLiter(s) Nebulizer every 6 hours PRN  aspirin 325 milliGRAM(s) Oral daily  chlorhexidine 4% Liquid 1 Application(s) Topical <User Schedule>  docusate sodium 100 milliGRAM(s) Oral two times a day  enoxaparin Injectable 40 milliGRAM(s) SubCutaneous <User Schedule>  senna 1 Tablet(s) Oral daily    EXAMINATION:  General:  calm  HEENT:  MMM  Neuro:  awake, alert, oriented x 3, follows commands, moves all extremities, trace dysarthria and expressive aphasia much improved  Cards:  RRR  Respiratory:  no respiratory distress  Adomen:  soft  Extremities:  no edema  Skin:  warm/dry

## 2019-06-24 NOTE — DISCHARGE NOTE PROVIDER - NSDCCPCAREPLAN_GEN_ALL_CORE_FT
PRINCIPAL DISCHARGE DIAGNOSIS  Diagnosis: Middle cerebral aneurysm  Assessment and Plan of Treatment:

## 2019-06-24 NOTE — PROGRESS NOTE ADULT - SUBJECTIVE AND OBJECTIVE BOX
Visit Summary: Patient seen and evaluated at bedside.    Overnight Events: none    Exam:  T(C): 36.9 (06-23-19 @ 23:00), Max: 37.2 (06-23-19 @ 03:00)  HR: 68 (06-23-19 @ 23:00) (65 - 95)  BP: 130/68 (06-23-19 @ 23:00) (116/55 - 152/80)  RR: 11 (06-23-19 @ 23:00) (11 - 21)  SpO2: 94% (06-23-19 @ 23:00) (93% - 99%)  Wt(kg): --    AOx3, FC, PERRL, EOMI, no facial   5/5 throughout, no drift  SILT  no clonus                          13.3   11.6  )-----------( 266      ( 22 Jun 2019 22:17 )             37.8     06-23    140  |  103  |  20  ----------------------------<  116<H>  4.3   |  22  |  0.81    Ca    9.8      23 Jun 2019 20:58  Phos  3.5     06-23  Mg     2.3     06-23

## 2019-06-24 NOTE — OCCUPATIONAL THERAPY INITIAL EVALUATION ADULT - LIVES WITH, PROFILE
Pt lives with spouse and adult son in apartment with 4STE and a handrail with 1 flight of steps to 2nd floor bedroom/bathroom. Pt was independent PTA with all ADLs and IADLs. Pt does not own any DME or AD./spouse/children

## 2019-06-24 NOTE — DISCHARGE NOTE PROVIDER - CARE PROVIDER_API CALL
Jaguar Moreira)  Neurological Surgery  [address] [place] [address]  Phone: [phone]  Fax: [phone]  Follow Up Time:

## 2019-06-26 ENCOUNTER — APPOINTMENT (OUTPATIENT)
Dept: NEUROSURGERY | Facility: CLINIC | Age: 64
End: 2019-06-26
Payer: COMMERCIAL

## 2019-06-26 PROCEDURE — 99214 OFFICE O/P EST MOD 30 MIN: CPT

## 2019-06-26 RX ORDER — ASPIRIN 325 MG/1
325 TABLET, FILM COATED ORAL
Refills: 0 | Status: ACTIVE | COMMUNITY

## 2019-06-26 RX ORDER — CLOPIDOGREL BISULFATE 75 MG/1
75 TABLET, FILM COATED ORAL
Qty: 30 | Refills: 6 | Status: DISCONTINUED | COMMUNITY
Start: 2019-05-09 | End: 2019-06-26

## 2019-06-26 NOTE — PHYSICAL EXAM
[General Appearance - Alert] : alert [General Appearance - In No Acute Distress] : in no acute distress [Person] : oriented to person [Place] : oriented to place [Time] : oriented to time [Cranial Nerves Optic (II)] : visual acuity intact bilaterally,  pupils equal round and reactive to light [Cranial Nerves Oculomotor (III)] : extraocular motion intact [Cranial Nerves Trigeminal (V)] : facial sensation intact symmetrically [Cranial Nerves Facial (VII)] : face symmetrical [Cranial Nerves Vestibulocochlear (VIII)] : hearing was intact bilaterally [Cranial Nerves Glossopharyngeal (IX)] : tongue and palate midline [Cranial Nerves Accessory (XI - Cranial And Spinal)] : head turning and shoulder shrug symmetric [Cranial Nerves Hypoglossal (XII)] : there was no tongue deviation with protrusion [Motor Strength] : muscle strength was normal in all four extremities [Involuntary Movements] : no involuntary movements were seen [] : no respiratory distress [Abnormal Walk] : normal gait

## 2019-06-26 NOTE — ASSESSMENT
[FreeTextEntry1] : Impression: 63yr old female s/p endovascular embolization of unruptured left middle cerebral artery aneurysm with WEB 6/21/2019\par \par Plan: \par Speech issues totally resolved, patient back at her neurological baseline intact. \par MRA brain head non con in three months \par MRI brain head non con in three months\par Follow up in the office with Dr. Moreira in three months \par Repeat Cerebral Angiogram in 6months\par Continue Aspirin 325mg daily

## 2019-06-26 NOTE — CONSULT LETTER
[FreeTextEntry1] : As you know she is a 63yr old female with a past medical history of COPD, Hep C with moderate cirrhosis, and lung cancer.  She underwent MRI of the brain to evaluate for metastatic disease and it was significant for an unruptured large left middle cerebral artery aneurysm. Her social history is significant for former smoker and her family history consists of parents having a stroke but unsure if subarachnoid hemorrhage was present. She underwent diagnostic cerebral angiography 4/17/2019 which confirmed the presence of a 9mm left middle cerebral artery aneurysm. Due to the size and location of this aneurysm I recommend treatment. On June 21, 2019 she underwent cerebral angiography and embolization of the left middle cerebral artery aneurysm with WEB. She tolerated the procedure well and recovered in the Neurosurgical ICU post operatively.  Today she presents to the office for follow up and is neurologically intact. At this time I recommend continuing Aspirin 325mg daily and repeating MRI and MRA brain in three months. \par  [FreeTextEntry3] : \par Sincerely,\par \par Jaguar Moreira MD\par Professor of Neurological Surgery and Radiology\par  Department of Neurosurgery\par Director Cerebrovascular Surgery\par [employer] [facility] [employer] [facility]\par

## 2019-06-26 NOTE — REASON FOR VISIT
[Follow-Up: _____] : a [unfilled] follow-up visit [FreeTextEntry1] : Bree Doss is a 63yr old female here for a follow up visit having endovascular embolization of left middle cerebral artery aneurysm with WEB device 6/21/2019.  She tolerated the procedure well.  While she was recovering in the hospital post op she began having difficulty finding words 6/22/2019. An MRI of the brain was done and demonstrated a small area of restricted diffusion. Her symptoms improved 6/23/2019 and was discharged home in stable condition.  Since she has been home she has had mild headaches 4/10 occurring a few times a week, she takes tylenol and they are relieved. Her speech has returned to normal. She is on Aspirin 325mg daily (D/C'd plavix inpatient ). She denies any motor, sensory, visual abnormalities.

## 2019-06-26 NOTE — DATA REVIEWED
[de-identified] : MRI brain post aneurysm embolization [de-identified] : cerebral angiogram and embolization done 6/21/2019

## 2019-07-07 ENCOUNTER — INPATIENT (INPATIENT)
Facility: HOSPITAL | Age: 64
LOS: 1 days | Discharge: HOME | DRG: 65 | End: 2019-07-09
Attending: PSYCHIATRY & NEUROLOGY | Admitting: PSYCHIATRY & NEUROLOGY
Payer: COMMERCIAL

## 2019-07-07 VITALS
HEIGHT: 64 IN | OXYGEN SATURATION: 96 % | TEMPERATURE: 98 F | SYSTOLIC BLOOD PRESSURE: 161 MMHG | DIASTOLIC BLOOD PRESSURE: 91 MMHG | RESPIRATION RATE: 18 BRPM | WEIGHT: 153 LBS | HEART RATE: 92 BPM

## 2019-07-07 DIAGNOSIS — Z90.89 ACQUIRED ABSENCE OF OTHER ORGANS: Chronic | ICD-10-CM

## 2019-07-07 DIAGNOSIS — Z90.2 ACQUIRED ABSENCE OF LUNG [PART OF]: Chronic | ICD-10-CM

## 2019-07-07 DIAGNOSIS — Z98.890 OTHER SPECIFIED POSTPROCEDURAL STATES: Chronic | ICD-10-CM

## 2019-07-07 DIAGNOSIS — Z87.39 PERSONAL HISTORY OF OTHER DISEASES OF THE MUSCULOSKELETAL SYSTEM AND CONNECTIVE TISSUE: Chronic | ICD-10-CM

## 2019-07-07 LAB
ALBUMIN SERPL ELPH-MCNC: 4.8 G/DL — SIGNIFICANT CHANGE UP (ref 3.3–5)
ALP SERPL-CCNC: 112 U/L — SIGNIFICANT CHANGE UP (ref 40–120)
ALT FLD-CCNC: 24 U/L — SIGNIFICANT CHANGE UP (ref 10–45)
ANION GAP SERPL CALC-SCNC: 16 MMOL/L — SIGNIFICANT CHANGE UP (ref 5–17)
APPEARANCE UR: CLEAR — SIGNIFICANT CHANGE UP
APTT BLD: 29.3 SEC — SIGNIFICANT CHANGE UP (ref 27.5–36.3)
AST SERPL-CCNC: 21 U/L — SIGNIFICANT CHANGE UP (ref 10–40)
BASOPHILS # BLD AUTO: 0.1 K/UL — SIGNIFICANT CHANGE UP (ref 0–0.2)
BASOPHILS NFR BLD AUTO: 1 % — SIGNIFICANT CHANGE UP (ref 0–2)
BILIRUB SERPL-MCNC: 0.3 MG/DL — SIGNIFICANT CHANGE UP (ref 0.2–1.2)
BILIRUB UR-MCNC: NEGATIVE — SIGNIFICANT CHANGE UP
BUN SERPL-MCNC: 16 MG/DL — SIGNIFICANT CHANGE UP (ref 7–23)
CALCIUM SERPL-MCNC: 10.4 MG/DL — SIGNIFICANT CHANGE UP (ref 8.4–10.5)
CHLORIDE SERPL-SCNC: 102 MMOL/L — SIGNIFICANT CHANGE UP (ref 96–108)
CO2 SERPL-SCNC: 20 MMOL/L — LOW (ref 22–31)
COLOR SPEC: COLORLESS — SIGNIFICANT CHANGE UP
CREAT SERPL-MCNC: 0.69 MG/DL — SIGNIFICANT CHANGE UP (ref 0.5–1.3)
DIFF PNL FLD: NEGATIVE — SIGNIFICANT CHANGE UP
EOSINOPHIL # BLD AUTO: 0.6 K/UL — HIGH (ref 0–0.5)
EOSINOPHIL NFR BLD AUTO: 6.6 % — HIGH (ref 0–6)
ETHANOL SERPL-MCNC: SIGNIFICANT CHANGE UP MG/DL (ref 0–10)
GLUCOSE SERPL-MCNC: 140 MG/DL — HIGH (ref 70–99)
GLUCOSE UR QL: NEGATIVE — SIGNIFICANT CHANGE UP
HCT VFR BLD CALC: 45.2 % — HIGH (ref 34.5–45)
HGB BLD-MCNC: 15.3 G/DL — SIGNIFICANT CHANGE UP (ref 11.5–15.5)
INR BLD: 0.93 RATIO — SIGNIFICANT CHANGE UP (ref 0.88–1.16)
KETONES UR-MCNC: NEGATIVE — SIGNIFICANT CHANGE UP
LEUKOCYTE ESTERASE UR-ACNC: NEGATIVE — SIGNIFICANT CHANGE UP
LYMPHOCYTES # BLD AUTO: 2.9 K/UL — SIGNIFICANT CHANGE UP (ref 1–3.3)
LYMPHOCYTES # BLD AUTO: 30.2 % — SIGNIFICANT CHANGE UP (ref 13–44)
MCHC RBC-ENTMCNC: 29.6 PG — SIGNIFICANT CHANGE UP (ref 27–34)
MCHC RBC-ENTMCNC: 33.9 GM/DL — SIGNIFICANT CHANGE UP (ref 32–36)
MCV RBC AUTO: 87.3 FL — SIGNIFICANT CHANGE UP (ref 80–100)
MONOCYTES # BLD AUTO: 0.5 K/UL — SIGNIFICANT CHANGE UP (ref 0–0.9)
MONOCYTES NFR BLD AUTO: 5.4 % — SIGNIFICANT CHANGE UP (ref 2–14)
NEUTROPHILS # BLD AUTO: 5.4 K/UL — SIGNIFICANT CHANGE UP (ref 1.8–7.4)
NEUTROPHILS NFR BLD AUTO: 56.8 % — SIGNIFICANT CHANGE UP (ref 43–77)
NITRITE UR-MCNC: NEGATIVE — SIGNIFICANT CHANGE UP
PH UR: 6 — SIGNIFICANT CHANGE UP (ref 5–8)
PLATELET # BLD AUTO: 294 K/UL — SIGNIFICANT CHANGE UP (ref 150–400)
POTASSIUM SERPL-MCNC: 4.2 MMOL/L — SIGNIFICANT CHANGE UP (ref 3.5–5.3)
POTASSIUM SERPL-SCNC: 4.2 MMOL/L — SIGNIFICANT CHANGE UP (ref 3.5–5.3)
PROT SERPL-MCNC: 7.7 G/DL — SIGNIFICANT CHANGE UP (ref 6–8.3)
PROT UR-MCNC: NEGATIVE — SIGNIFICANT CHANGE UP
PROTHROM AB SERPL-ACNC: 10.6 SEC — SIGNIFICANT CHANGE UP (ref 10–12.9)
RBC # BLD: 5.18 M/UL — SIGNIFICANT CHANGE UP (ref 3.8–5.2)
RBC # FLD: 13.3 % — SIGNIFICANT CHANGE UP (ref 10.3–14.5)
SODIUM SERPL-SCNC: 138 MMOL/L — SIGNIFICANT CHANGE UP (ref 135–145)
SP GR SPEC: 1.01 — SIGNIFICANT CHANGE UP (ref 1.01–1.02)
UROBILINOGEN FLD QL: NEGATIVE — SIGNIFICANT CHANGE UP
WBC # BLD: 9.6 K/UL — SIGNIFICANT CHANGE UP (ref 3.8–10.5)
WBC # FLD AUTO: 9.6 K/UL — SIGNIFICANT CHANGE UP (ref 3.8–10.5)

## 2019-07-07 PROCEDURE — 70553 MRI BRAIN STEM W/O & W/DYE: CPT | Mod: 26

## 2019-07-07 PROCEDURE — 70544 MR ANGIOGRAPHY HEAD W/O DYE: CPT | Mod: 26,59

## 2019-07-07 PROCEDURE — 99218: CPT

## 2019-07-07 PROCEDURE — 71045 X-RAY EXAM CHEST 1 VIEW: CPT | Mod: 26

## 2019-07-07 PROCEDURE — 70498 CT ANGIOGRAPHY NECK: CPT | Mod: 26

## 2019-07-07 PROCEDURE — 70496 CT ANGIOGRAPHY HEAD: CPT | Mod: 26

## 2019-07-07 PROCEDURE — 93010 ELECTROCARDIOGRAM REPORT: CPT

## 2019-07-07 RX ORDER — TRIAMTERENE/HYDROCHLOROTHIAZID 75 MG-50MG
1 TABLET ORAL DAILY
Refills: 0 | Status: DISCONTINUED | OUTPATIENT
Start: 2019-07-07 | End: 2019-07-09

## 2019-07-07 RX ORDER — ASPIRIN/CALCIUM CARB/MAGNESIUM 324 MG
325 TABLET ORAL ONCE
Refills: 0 | Status: COMPLETED | OUTPATIENT
Start: 2019-07-07 | End: 2019-07-07

## 2019-07-07 RX ORDER — DIAZEPAM 5 MG
5 TABLET ORAL ONCE
Refills: 0 | Status: COMPLETED | OUTPATIENT
Start: 2019-07-07 | End: 2019-07-14

## 2019-07-07 RX ORDER — CLOPIDOGREL BISULFATE 75 MG/1
75 TABLET, FILM COATED ORAL DAILY
Refills: 0 | Status: DISCONTINUED | OUTPATIENT
Start: 2019-07-07 | End: 2019-07-09

## 2019-07-07 RX ORDER — ASPIRIN/CALCIUM CARB/MAGNESIUM 324 MG
81 TABLET ORAL DAILY
Refills: 0 | Status: DISCONTINUED | OUTPATIENT
Start: 2019-07-07 | End: 2019-07-09

## 2019-07-07 RX ORDER — SODIUM CHLORIDE 9 MG/ML
3 INJECTION INTRAMUSCULAR; INTRAVENOUS; SUBCUTANEOUS EVERY 8 HOURS
Refills: 0 | Status: DISCONTINUED | OUTPATIENT
Start: 2019-07-07 | End: 2019-07-08

## 2019-07-07 RX ORDER — DIAZEPAM 5 MG
5 TABLET ORAL ONCE
Refills: 0 | Status: DISCONTINUED | OUTPATIENT
Start: 2019-07-07 | End: 2019-07-07

## 2019-07-07 RX ADMIN — Medication 325 MILLIGRAM(S): at 15:37

## 2019-07-07 RX ADMIN — Medication 5 MILLIGRAM(S): at 22:14

## 2019-07-07 RX ADMIN — CLOPIDOGREL BISULFATE 75 MILLIGRAM(S): 75 TABLET, FILM COATED ORAL at 21:49

## 2019-07-07 RX ADMIN — SODIUM CHLORIDE 3 MILLILITER(S): 9 INJECTION INTRAMUSCULAR; INTRAVENOUS; SUBCUTANEOUS at 21:48

## 2019-07-07 NOTE — ED PROVIDER NOTE - CLINICAL SUMMARY MEDICAL DECISION MAKING FREE TEXT BOX
63F p/w slurred speech and subjective R hand weakness since this AM. 63F p/w slurred speech and subjective R hand weakness since this AM.  See attending statement below

## 2019-07-07 NOTE — CONSULT NOTE ADULT - ASSESSMENT
Assessment:  63Y R handed  female with PMH HTN (on ASA 325mg), COPD not on home O2, former smoker with lung cancer s/p RUL lobectomy with chemo, HCV with liver cirrhosis, incidentally found to have 10mm L MCA aneurysm on CTA 5/2019 s/p web embolization on 6/21/19 who presented with L frontal HA, slurred speech and R hand weakness since 3:30 this morning. Symptoms have largely resolved. CTH and CTA H/N without acute abnormality, with stenosis of distal R MCA branch. Symptoms concerning for L MCA ischemic stroke    Plan:  - CTH and CTA Head/ Neck reviewed  - MRI Brain w/o contrast, agree with MRA head w/o contrast given recent web embolization. Will need pre-medication prior to MRI due to anxiety  - ASA 81mg daily and Plavix 75mg daily for 3 weeks, followed by ASA 81mg alone  - Neurochecks q4h  - monitor in CDU overnight     Case discussed with stroke attending, Dr. Oquendo and patient who expressed understanding. Assessment:  63Y R handed  female with PMH HTN (on ASA 325mg), COPD not on home O2, former smoker with lung cancer s/p RUL lobectomy with chemo, HCV with liver cirrhosis, incidentally found to have 10mm L MCA aneurysm on CTA 5/2019 s/p web embolization on 6/21/19 who presented with L frontal HA, slurred speech and R hand weakness since 3:30 this morning. Symptoms have largely resolved. CTH and CTA H/N without acute abnormality, with stenosis of distal R MCA branch. Symptoms concerning for L MCA ischemic stroke    Plan:  - CTH and CTA Head/ Neck reviewed  - MRI Brain w/o contrast, agree with MRA head/neck w/o contrast given recent web embolization. Will need pre-medication prior to MRI due to anxiety  - ASA 81mg daily and Plavix 75mg daily for 3 weeks, followed by ASA 81mg alone  - Lipitor 80mg daily  - TTE with bubble study  - Neurochecks q4h  - monitor in CDU overnight     Case discussed with stroke attending, Dr. Oquendo and patient who expressed understanding.

## 2019-07-07 NOTE — ED CDU PROVIDER INITIAL DAY NOTE - ATTENDING CONTRIBUTION TO CARE
***William Lindsay MD***  I have personally performed a face to face diagnostic evaluation on this patient.  I have reviewed the ACP note and agree with the history, exam, and plan of care, except as noted. Patient will be reassessed and discussed with AM/PM CDU/FT MD who will follow up on labs, analgesia, imaging and disposition as clinically indicated. Please see emergency department provider note.

## 2019-07-07 NOTE — ED ADULT NURSE REASSESSMENT NOTE - GENERAL PATIENT STATE
smiling/interactive/resting/sleeping/comfortable appearance/cooperative
family/SO at bedside/cooperative/comfortable appearance/resting/sleeping

## 2019-07-07 NOTE — ED PROVIDER NOTE - NSCAREINITIATED _GEN_ER
HPI:   Aiden Marquez is a 71year old female who presents for a Medicare Subsequent Annual Wellness visit (Pt already had Initial Annual Wellness). Here for well visit and establish care for htn and high chol, stable on meds, no se's.  See epic for d blood pressure; Breast cancer ([facility] [address] Diverticulitis; Essential hypertension; and Hyperlipidemia.     She  has past surgical history that includes appendectomy (2005); colonoscopy,diagnostic (2004); special service or report (2005); special service or report both eyes   Ears:  Normal TM's and external ear canals, both ears   Nose: Nares normal, septum midline,mucosa normal, no drainage or sinus tenderness   Throat: Lips, mucosa, and tongue normal; teeth and gums normal   Neck: Supple, symmetrical, trachea midl breast cancer  -     GERMÁN SCREENING BILAT (CPT=77067); Future  Resolved, mammo ordered  Encounter for hepatitis C screening test for low risk patient  -     HCV ANTIBODY [8472] [Q]; Future    Other orders  -     atenolol 50 MG Oral Tab;  Take 1 tablet (50 mg This section provided for quick review of chart, separate sheet to patient  [address] Internal Lab or Procedure External Lab or Procedure   Diabetes Screening      HbgA1C   Annually No results found for: A1C No fl (Pneumovax)  Covered Once after 65 No vaccine history found Please get once after your 65th birthday    Hepatitis B for Moderate/High Risk No vaccine history found Medium/high risk factors:   End-stage renal disease   Hemophiliacs who received Factor VIII [43711] William Lindsay(Attending)

## 2019-07-07 NOTE — CONSULT NOTE ADULT - ATTENDING COMMENTS
I have seen and examined this patient with the stroke neurology team.     History was reviewed with the patient and/or available family members.   ROS: All negative except documented in the HPI.   Neurological exam was performed and agree with exam as documented above.   Laboratory results and imaging studies were reviewed by me.   I agree with the neurology resident note as documented above.    63 years old woman with multiple vascular risk factors including age, HTN and COPD and a history of 10 mm left MCA aneurysm s/p embolization on 6/21/19 is evaluated at [facility] for an acute onset of right hand clumsiness/weakness and slurring of speech noted upon waking up in the morning of 7/7 at around 3:30 AM. Neurological examination today shows decreased fine finger movements on the right and mild dysarthria. CT brain on admission did not show any evidence of an acute infarct or hemorrhage. CTA head and neck showed artifact from WEB placement in the left MCA bifurcation aneurysm but was otherwise grossly unremarkable.    Impression:  Cerebral thrombosis with cerebral infarction. Probable left MCA distribution stroke versus basilar stroke presenting as clumsy hand-dysarthria lacunar stroke syndrome - likely mechanism being small vessel disease but possibility of an embolic stroke from undetermined source needs to be ruled out    Plan:  - Not a candidate for IV tPA as patient is out of the time window  - Not a candidate for mechanical embolectomy based on the CTA head and neck results and low NIHSS   - Aspirin and clopidogrel for aggressive secondary stroke prevention for 3 weeks followed by aspirin  - Atorvastatin 80 mg at bedtime once able to pass the swallow evaluation or enteral access is established; titrate the dose according to LDL  - HbA1C and LDL, continue with aggressive vascular risk factors modifications   - Permissive HTN up to 220/110 mmHg for first 48-72 hours from symptom onset followed by gradual normotension  - MRI brain without contrast and MRA head and neck to evaluate for intracranial and extracranial cerebral vasculature   - TTE with bubble study and continue with telemetry to screen for possible cardiac source of embolism  - Prolonged cardiac monitoring with 30 days event monitor versus ICM to screen for occult cardiac arrhythmia like atrial fibrillation being the cause of possible cardiac source of embolism to be considered based on results of above mentioned cardiac tests    - PT/OT/Speech and swallow/safety eval  - Stroke education  - Case discussed with Dr. Moreira - neuro-interventionistist    Above mentioned plan was discussed with patient and her  at bedside in detail. All the questions were answered and concerns were addressed.

## 2019-07-07 NOTE — ED CDU PROVIDER DISPOSITION NOTE - PLAN OF CARE
As recommended by Neurology...............   ASA 81mg daily and Plavix 75mg daily for 3 weeks, followed by ASA 81mg alone  Follow up with your Primary Care Physician within the next 2-3 days  Bring a copy of your test results with you to your appointment  Continue your current home medication regimen  Return to the Emergency Room if you experience new or worsening symptoms

## 2019-07-07 NOTE — ED ADULT NURSE NOTE - OBJECTIVE STATEMENT
63 yrs old female with h/o brain aneurysm, lung cancer, hepatitis C and cirrhosis of the liver present to the ER for slurred speech that started @ 3 :30 this am which resolved. Pt had clipping done to the aneurysm on 06/21/2019 and reported that she had a "small stroke" a couple of hours after the procedure. Pt has full ROM in all extremities with no facial drooping noted or weakness reported.

## 2019-07-07 NOTE — ED CDU PROVIDER INITIAL DAY NOTE - PROGRESS NOTE DETAILS
RUSTY Juárez: Patient seen at bedside in NAD.  VSS.  Patient resting comfortably without complaints. No events noted over tele. Patient neuro exam unchanged from initial exam. mild slurred speech, decreased sensation RUE and decreased coordination in RUE CDU PROGRESS NOTE RUSTY ACOSTA: Received pt at 1900 sign-out. Pt resting in stretcher in NAD. Case/plan reviewed. VSS. Pt aox3. mild dysarthria, normal attention, language and fund of knowledge. Decreased strength and coordination in RUE.  Ambulatory around unit with steady gait. S1 S2 noted, RRR, lungs CTA b/l, BS x4 with soft, nontender abdomen. Pt without complaints. Will continue to monitor overnight. CDU PROGRESS NOTE RUSTY ACOSTA: Pt premedicated for MRI with Diazepam 5mg po. CDU PROGRESS NOTE RUSTY ACOSTA: Pt resting in stretcher in NAD. Returned from MRI. VSS. Pt aox3. mild dysarthria, Decreased strength and coordination in RUE.  Ambulatory around unit with steady gait. Pt without complaints.

## 2019-07-07 NOTE — ED ADULT NURSE REASSESSMENT NOTE - NS ED NURSE REASSESS COMMENT FT1
Received pt from RAHUL Sam , received pt alert and responsive, oriented x4, denies any respiratory distress, SOB, or difficulty breathing. Pt transferred to CDU for R hand weakness and slurred speech. Speech normal no deficits noted. + R hand weakness noted, decreased strength. Pt denies headache, blurred/ double vision. Pending MRI in AM pt aware. On telemetry pt NSR. IV in place, patent and free of signs of infiltration, pt denies chest pain or palpitations, V/S stable, pt afebrile, pt denies pain at this time. Pt educated on unit and unit rules, instructed patient to notify RN of any needed assistance, Pt verbalizes understanding, Call bell placed within reach. Safety maintained. Will continue to monitor.

## 2019-07-07 NOTE — CONSULT NOTE ADULT - ASSESSMENT
Bree Doss  63F pmhx HTN, COPD, Lung CA s/p RUL lobectomy (2018) and chemo, HCV c/b liver cirrhosis s/p WEB 6/21/19 with transient post op WFD and R hand weakness presents for reoccurrence of transient symptoms this morning with headache. Now back to baseline with very slight hesitation when speaking at times, on [id number].  - CTH and CTA look grossly normal with good arborization distal to WEB, read as stenosis of distal right MCA branch vasculature  - Will stay for observation overnight and MRI/MRA head wo

## 2019-07-07 NOTE — ED PROVIDER NOTE - ATTENDING CONTRIBUTION TO CARE
In this physician's medical judgement based on clinical history and physical exam, patient with dysarthria upon awakening, history of left mca aneurysm status post embolization who reports to emergency department with new deficits.   Will get iv, cbc, cmp, ct head, cta head/neck  neuro and neurosurgery consult and admit  Based on patient's history and physical exam, as well as the results of today's workup, I feel that patient warrants admission to the hospital for further workup/evaluation and continued management. I discussed the findings of today's workup with the patient and addressed the patient's questions and concerns. The patient was agreeable with admission. Our team spoke with the inpatient receiving team who accepted the patient for admission and subsequently took over the patient's care at the time of admission. In this physician's medical judgement based on clinical history and physical exam, patient with dysarthria upon awakening, history of left mca aneurysm status post embolization who reports to emergency department with new deficits.   Will get iv, cbc, cmp, ct head, cta head/neck  neuro and neurosurgery consult and admit  Based on patient's history and physical exam, as well as the results of today's workup, I feel that patient warrants admission/observation in the hospital for further workup/evaluation and continued management. I discussed the findings of today's workup with the patient and addressed the patient's questions and concerns. The patient was agreeable with observation. Our team spoke with the observation team receiving who accepted the patient for observation and subsequently took over the patient's care at the time of admission.

## 2019-07-07 NOTE — CONSULT NOTE ADULT - SUBJECTIVE AND OBJECTIVE BOX
HPI:  63Y R handed  female with PMH HTN (on ASA 325mg), COPD not on home O2, former smoker with lung cancer s/p RUL lobectomy with chemo, HCV with liver cirrhosis, incidentally found to have 10mm L MCA aneurysm on CTA 5/2019 s/p web embolization on 6/21/19 who presented with L frontal HA, slurred speech and R hand weakness since 3:30 this morning. Patient stated that she woke up and started talking to her cat and realized she was unable to articulate the words. When she tried to feed her cat, her R hand felt very weak and was unable to open the cat food properly. She states that she started feeling better around 9am. She had a similar episode s/p her recent embolization procedure in June 2019 when she noticed she had some slurred speech, but no weakness or other neurologic deficits. Symptoms self resolved over the next 1-2 days. She had an additional episode of L sided frontal HA with R hand numbness and weakness last Saturday that self resolved as well.   Stroke code not called when patient arrived as outside of tPA window.     PAST MEDICAL & SURGICAL HISTORY:  Cerebral aneurysm: left MCA 10  s/p cerebral angio 4/2019  Liver cirrhosis: stable  HTN (hypertension)  Lung cancer: s/p chemo last summer, s/p RUL lobectomy 5/2018 at [place] with Dr. Clayton  Hepatitis C: in remission for 12 years. Moderate damage done to Liver.  COPD with emphysema: stable, denies hospitalizations/ ER visits  History of eye surgery: as a baby , cyst under eyelid  H/O ganglion cyst: left wrist 24 yr ago  History of liver biopsy: 20 yr ago  S/P lobectomy of lung: right upper lobe  5/2018  S/P tonsillectomy    FAMILY HISTORY:  Denies history of Cardiac disease or stroke    Social Hx:  former smoker, no drug or alcohol use    Home Medications:  acetaminophen 325 mg oral tablet: 2 tab(s) orally every 6 hours, As needed, Mild Pain (1 - 3) (24 Jun 2019 11:22)  aspirin 325 mg oral tablet: 1 tab(s) orally once a day (24 Jun 2019 11:22)  Breo Ellipta 100 mcg-25 mcg/inh inhalation powder: 1 puff(s) inhaled once a day (13 Jun 2019 16:27)  docusate sodium 100 mg oral capsule: 1 cap(s) orally 2 times a day (24 Jun 2019 11:22)  Incruse Ellipta 62.5 mcg/inh inhalation powder: 1 puff(s) inhaled once a day (13 Jun 2019 16:27)  ProAir HFA 90 mcg/inh inhalation aerosol: 2 puff(s) inhaled 4 times a day, As Needed (13 Jun 2019 16:27)  senna oral tablet: 1 tab(s) orally once a day (24 Jun 2019 11:22)  triamterene-hydrochlorothiazide 37.5 mg-25 mg oral tablet: 1 tab(s) orally once a day (13 Jun 2019 16:27)    MEDICATIONS  (STANDING):  sodium chloride 0.9% lock flush 3 milliLiter(s) IV Push every 8 hours  triamterene 37.5 mG/hydrochlorothiazide 25 mG Capsule 1 Capsule(s) Oral daily    MEDICATIONS  (PRN):    Allergies  penicillins (Rash)    REVIEW OF SYSTEMS  ROS: Pertinent positives in HPI, all other ROS were reviewed and are negative.      Vital Signs Last 24 Hrs  T(C): 36.4 (07 Jul 2019 16:00), Max: 36.7 (07 Jul 2019 09:28)  T(F): 97.6 (07 Jul 2019 16:00), Max: 98 (07 Jul 2019 09:28)  HR: 97 (07 Jul 2019 16:00) (92 - 97)  BP: 131/91 (07 Jul 2019 16:00) (131/91 - 161/91)  RR: 19 (07 Jul 2019 16:00) (18 - 19)  SpO2: 98% (07 Jul 2019 16:00) (96% - 98%)      NEUROLOGICAL EXAM:  MS: AAOX3, mild dysarthria, normal attention, language and fund of knowledge.    CN: VFF, EOMI, PERRL, no LALO, no APD,  V1-3 intact, no facial asymmetry, t/p midline, SCM/trap intact.  Eyes-Fundi: no papilledema.  Motor: Strength: 5/5 4x. Tone: normal. Bulk: normal. DTR 2+ symm.  Plantar flex b/l. Sensation: intact to LT/PP/Vibration/Position/Temperature 4x. Decreased fine finger movement on the R. Mild clumsiness in R arm.   Coordination: intact 4x.   Gait:  Romberg negative    NIHSS: 1  mRS: 0     Labs:   cbc                      15.3   9.6   )-----------( 294      ( 07 Jul 2019 09:58 )             45.2     [id number]    138  |  102  |  16  ----------------------------<  140<H>  4.2   |  20<L>  |  0.69    Ca    10.4      07 Jul 2019 09:58    TPro  7.7  /  Alb  4.8  /  TBili  0.3  /  DBili  x   /  AST  21  /  ALT  24  /  AlkPhos  112  07-07    CoagsPT/INR - ( 07 Jul 2019 09:58 )   PT: 10.6 sec;   INR: 0.93 ratio         PTT - ( 07 Jul 2019 09:58 )  PTT:29.3 sec    LFTsLIVER FUNCTIONS - ( 07 Jul 2019 09:58 )  Alb: 4.8 g/dL / Pro: 7.7 g/dL / ALK PHOS: 112 U/L / ALT: 24 U/L / AST: 21 U/L / GGT: x             Radiology:  CT brain: Status post left MCA bifurcation aneurysm embolization with   redemonstration of areas of slight decreased attenuation along the left   posterior frontal lobe involving the pre and postcentral gyrus, in the   region of previously demonstrated restricted diffusion on MRI of   6/22/2019, small foci of developing ischemia not excluded, repeat MRI may   be obtained for improved assessment as clinically warranted.. There is no   new intracranial hemorrhage, extra-axial collection, midline shift,   central herniation, hydrocephalus or transcortical infarct.     CT angiography neck: No  hemodynamically significant stenosis by NASCET   criteria. No evidence of vascular dissection.    CT angiography brain: Status post left MCA bifurcation aneurysm   embolization with a  stenosis of the distal right MCA branch vasculature.   Dolichoectatic intracranial circulation may reflect hypertension. Bulbous   distal right M1 and junction of right M2, suggest CT follow-up to ensure   stability.

## 2019-07-07 NOTE — ED CDU PROVIDER INITIAL DAY NOTE - OBJECTIVE STATEMENT
62 y/o female with PMHx of HTN on ASA 325mg, COPD not on home O2, former smoker lung CA s/p RUL lobectomy in 5/2018 with chemo from June-August 2018, HCV treated but now with liver cirrhosis, incidentally found to have 10mm L MCA on CTA in May 2019 now s/p web embolization on 6/21/19, p/w slurred speech that she woke up with at 3:30AM with left sided frontal headache and R hand weakness (was dropping dishes/sponge this AM) which resolved at 9AM. Pt says she had similar speech neuro deficit immediately following the embolization procedure in June, prolonging her in patient stay, but that it ultimately self resolved and she was DC'd home. She is concerned bc she woke up with similar findngs this AM. Denies f/c, cough, cp/sob, palpitations, gait abnormality, dizziness, vision changes, N/V/D, fever chills, abdominal pain, urinary complaints

## 2019-07-07 NOTE — ED CDU PROVIDER DISPOSITION NOTE - ATTENDING CONTRIBUTION TO CARE
64 y/o F with h/o HTN on ASA 325mg, COPD not on home O2, former smoker lung CA s/p RUL lobectomy in 5/2018 with chemo from June-August 2018, HCV treated but now with cirrhosis, and incidentally found to have L MCA on CTA in May 2019 now s/p web embolization on 6/21/19, p/w slurred speech that she woke up with at 3:30AM a/w left sided frontal headache and right hand weakness (was dropping dishes/sponge this AM) which resolved at 9AM.    In ED, patient evaluated, had CT scan and evaluated by neuro, who recommended observation in CDU with MR brain for further evaluation.    In CDU, patient remains stable, still with some R hand/ weakness, 4/5; and MR obtained showing CVA likely cause of symptoms; given persistent symptoms (slurred speech and RUE weakness) patient now to be admitted to neuro / stroke service for further evaluation, including TTE, and PT/OT evaluation, possible FARZAD.

## 2019-07-07 NOTE — ED CDU PROVIDER INITIAL DAY NOTE - PMH
Cerebral aneurysm  left MCA 9mm  s/p cerebral angio 4/2019  COPD with emphysema  stable, denies hospitalizations/ ER visits  Hepatitis C  in remission for 12 years. Moderate damage done to Liver.    HTN (hypertension)    Liver cirrhosis  stable  Lung cancer  s/p chemo last summer, s/p RUL lobectomy 5/2018 at [place] with Dr. Clayton

## 2019-07-07 NOTE — ED PROVIDER NOTE - PHYSICAL EXAMINATION
+5/5 strength in all extremities  Sensory grossly intact  Finger-to-nose testing wnl  Cranial nerves intact  PERRLA +5/5 strength in all extremities  Sensory grossly intact  Finger-to-nose testing wnl  Cranial nerves intact  PERRLA  dysarthric

## 2019-07-07 NOTE — ED CDU PROVIDER DISPOSITION NOTE - CLINICAL COURSE
Patient is 63Y R handed  female with PMH HTN (on ASA 325mg), COPD not on home O2, former smoker with lung cancer s/p RUL lobectomy with chemo, HCV with liver cirrhosis, incidentally found to have 10mm L MCA aneurysm on CTA 5/2019 s/p web embolization on 6/21/19 who presented with L frontal HA, slurred speech and R hand weakness since 3:30 this morning. Symptoms have largely resolved since ED arrival. CTH and CTA H/N without acute abnormality, with stenosis of distal R MCA branch. Symptoms concerning for L MCA ischemic stroke. Patient sent to CDU for MRI Brain w/o contrast, MRA head w/o contrast given recent web embolization. ASA 81mg daily and Plavix 75mg daily for 3 weeks, followed by ASA 81mg alone, Neurochecks q4h, monitor in CDU overnight Patient is 63Y R handed  female with PMH HTN (on ASA 325mg), COPD not on home O2, former smoker with lung cancer s/p RUL lobectomy with chemo, HCV with liver cirrhosis, incidentally found to have 10mm L MCA aneurysm on CTA 5/2019 s/p web embolization on 6/21/19 who presented with L frontal HA, slurred speech and R hand weakness since 3:30 this morning. CT head should Status post left MCA bifurcation aneurysm embolization with redemonstration of areas of slight decreased attenuation along the left posterior frontal lobe involving the pre and postcentral gyrus, in the region of previously demonstrated restricted diffusion on MRI of 6/22/2019, small foci of developing ischemia not excluded. There is no new intracranial hemorrhage, extra-axial collection, midline shift, central herniation, hydrocephalus or transcortical infarct. Symptoms concerning for L MCA ischemic stroke. Patient sent to CDU for MRI Brain w/o contrast, MRA head w/o contrast given recent web embolization. ASA 81mg daily and Plavix 75mg daily for 3 weeks, followed by ASA 81mg alone, Neurochecks q4h, monitor in CDU overnight Patient is 63Y R handed  female with PMH HTN (on ASA 325mg), COPD not on home O2, former smoker with lung cancer s/p RUL lobectomy with chemo, HCV with liver cirrhosis, incidentally found to have 10mm L MCA aneurysm on CTA 5/2019 s/p web embolization on 6/21/19 who presented with L frontal HA, slurred speech and R hand weakness since 3:30 this morning. CT head should Status post left MCA bifurcation aneurysm embolization with redemonstration of areas of slight decreased attenuation along the left posterior frontal lobe involving the pre and postcentral gyrus, in the region of previously demonstrated restricted diffusion on MRI of 6/22/2019, small foci of developing ischemia not excluded. There is no new intracranial hemorrhage, extra-axial collection, midline shift, central herniation, hydrocephalus or transcortical infarct. Symptoms concerning for L MCA ischemic stroke. Patient sent to CDU for MRI Brain w/o contrast, MRA head w/o contrast given recent web embolization. ASA 81mg daily and Plavix 75mg daily for 3 weeks, followed by ASA 81mg alone, Neurochecks q4h, monitor in CDU overnight. no events on tele. MRI shows +acute strokes. pt continued to have R hand clumsiness and decreased  and minimal slurred speech. pt seen by neuro and neurosurgery. pt admitted to neuro tele.

## 2019-07-07 NOTE — CONSULT NOTE ADULT - SUBJECTIVE AND OBJECTIVE BOX
p ([id number])     HPI:   62 y/o female with pmhx of HTN, COPD, lung CA s/p RUL lobectomy in 5/2018 with chemo from June-August 2018, HCV treated but now with liver cirrhosis, incidentally found to have 10mm L MCA on CTA in May 2019 now s/p web embolization on 6/21/19, p/w slurred speech that she woke up with and R hand weakness (was dropping dishes/sponge this AM). Pt says she had similar speech neuro deficit immediately following the embolization procedure in June, prolonging her in patient stay, but that it ultimately self resolved and she was DC'd home. She is concerned bc she woke up with similar findngs this AM. Denies f/c, cough, cp/sob, palpitations, balance problems, vision changes.    Imaging:    Exam:  AOx3, FC, PERRL, EOMI, no facial, trace hesitation when speaking at times  5/5 throughout, no drift  SILT  no clonus      --Anticoagulation:    =====================  PAST MEDICAL HISTORY   Cerebral aneurysm  Liver cirrhosis  HTN (hypertension)  Lung cancer  Hepatitis C  COPD with emphysema    PAST SURGICAL HISTORY   History of eye surgery  H/O ganglion cyst  History of liver biopsy  S/P lobectomy of lung  S/P tonsillectomy    penicillins (Rash)      MEDICATIONS:  Antibiotics:    Neuro:  aspirin 325 milliGRAM(s) Oral Once    Other:      SOCIAL HISTORY:   Occupation:   Marital Status:     FAMILY HISTORY:      ROS: Negative except per HPI    LABS:  PT/INR - ( 07 Jul 2019 09:58 )   PT: 10.6 sec;   INR: 0.93 ratio         PTT - ( 07 Jul 2019 09:58 )  PTT:29.3 sec                        15.3   9.6   )-----------( 294      ( 07 Jul 2019 09:58 )             45.2     07-07    138  |  102  |  16  ----------------------------<  140<H>  4.2   |  20<L>  |  0.69    Ca    10.4      07 Jul 2019 09:58    TPro  7.7  /  Alb  4.8  /  TBili  0.3  /  DBili  x   /  AST  21  /  ALT  24  /  AlkPhos  112  07-07

## 2019-07-07 NOTE — ED ADULT NURSE NOTE - CHPI ED NUR SYMPTOMS NEG
no change in level of consciousness/no dizziness/no fever/no weakness/no confusion/no blurred vision

## 2019-07-07 NOTE — ED ADULT TRIAGE NOTE - CHIEF COMPLAINT QUOTE
slurred speech now resolving/ rt hand weakness/ pain started at 3:30am, history of cerebral aneurysm.  Pt able to speak in full sentences, no arm drift.  Pt last seen normal by  at 09:00pm last night

## 2019-07-07 NOTE — ED ADULT NURSE REASSESSMENT NOTE - COMFORT CARE
darkened lights/ambulated to bathroom/repositioned/plan of care explained/po fluids offered/warm blanket provided
meal provided/treatment delay explained/wait time explained/darkened lights/plan of care explained/side rails up/warm blanket provided

## 2019-07-07 NOTE — ED ADULT NURSE NOTE - PMH
Cerebral aneurysm  left MCA 9mm  s/p cerebral angio 4/2019  COPD with emphysema  stable, denies hospitalizations/ ER visits  Hepatitis C  in remission for 12 years. Moderate damage done to Liver.    HTN (hypertension)    Liver cirrhosis  stable  Lung cancer  s/p chemo last summer, s/p RUL lobectomy 5/2018 at [place] with Dr. Clayton

## 2019-07-07 NOTE — ED ADULT NURSE NOTE - PSH
H/O ganglion cyst  left wrist 24 yr ago  History of eye surgery  as a baby , cyst under eyelid  History of liver biopsy  20 yr ago  S/P lobectomy of lung  right upper lobe  5/2018  S/P tonsillectomy

## 2019-07-07 NOTE — ED PROVIDER NOTE - PMH
Cerebral aneurysm  left MCA 9mm  s/p cerebral angio 4/2019  COPD with emphysema  stable, denies hospitalizations/ ER visits  Hepatitis C  in remission for 12 years. Moderate damage done to Liver.    HTN (hypertension)    Liver cirrhosis  stable  Lung cancer  s/p chemo last summer, s/p RUL lobectomy 5/2018 at [place] with Dr. Clayton

## 2019-07-07 NOTE — ED PROVIDER NOTE - PROGRESS NOTE DETAILS
patient seen by neurology, out of code stroke window upon arrival secondary to wake up stroke and low stroke scale

## 2019-07-07 NOTE — ED CDU PROVIDER INITIAL DAY NOTE - NEUROLOGICAL SENSATION
decreased in RUE, 3/5 motor strength right hand decreased in RUE, 4/5 motor strength right hand and decreased coordination in right UE

## 2019-07-07 NOTE — ED CDU PROVIDER INITIAL DAY NOTE - DETAILS
62 y/o female with PMHx of HTN on ASA 325mg, COPD not on home O2, former smoker lung CA s/p RUL lobectomy in 5/2018 with chemo from June-August 2018, HCV treated but now with liver cirrhosis, incidentally found to have 10mm L MCA on CTA in May 2019 now s/p web embolization on 6/21/19, p/w slurred speech that she woke up with at 3:30AM with left sided frontal headache and R hand weakness (was dropping dishes/sponge this AM) which resolved at 9AM.  *RULE OUT STROKE  - neuro checks q4 hours  - appreciate neurology/ neurosurgery recommendations  -MRA head and MRI brain without contrast per neurosurg recs  -Discussed case with Dr. Lindsay

## 2019-07-08 ENCOUNTER — TRANSCRIPTION ENCOUNTER (OUTPATIENT)
Age: 64
End: 2019-07-08

## 2019-07-08 DIAGNOSIS — I63.9 CEREBRAL INFARCTION, UNSPECIFIED: ICD-10-CM

## 2019-07-08 DIAGNOSIS — R47.1 DYSARTHRIA AND ANARTHRIA: ICD-10-CM

## 2019-07-08 LAB
CHOLEST SERPL-MCNC: 269 MG/DL — HIGH (ref 10–199)
HBA1C BLD-MCNC: 5.8 % — HIGH (ref 4–5.6)
HDLC SERPL-MCNC: 37 MG/DL — LOW
LIPID PNL WITH DIRECT LDL SERPL: SIGNIFICANT CHANGE UP MG/DL
TOTAL CHOLESTEROL/HDL RATIO MEASUREMENT: 7.3 RATIO — HIGH (ref 3.3–7.1)
TRIGL SERPL-MCNC: 404 MG/DL — HIGH (ref 10–149)

## 2019-07-08 PROCEDURE — 99217: CPT

## 2019-07-08 PROCEDURE — 99233 SBSQ HOSP IP/OBS HIGH 50: CPT

## 2019-07-08 RX ORDER — ENOXAPARIN SODIUM 100 MG/ML
40 INJECTION SUBCUTANEOUS EVERY 24 HOURS
Refills: 0 | Status: DISCONTINUED | OUTPATIENT
Start: 2019-07-08 | End: 2019-07-09

## 2019-07-08 RX ORDER — CLOPIDOGREL BISULFATE 75 MG/1
600 TABLET, FILM COATED ORAL ONCE
Refills: 0 | Status: COMPLETED | OUTPATIENT
Start: 2019-07-08 | End: 2019-07-08

## 2019-07-08 RX ORDER — CLOPIDOGREL BISULFATE 75 MG/1
525 TABLET, FILM COATED ORAL ONCE
Refills: 0 | Status: COMPLETED | OUTPATIENT
Start: 2019-07-08 | End: 2019-07-08

## 2019-07-08 RX ADMIN — Medication 81 MILLIGRAM(S): at 16:18

## 2019-07-08 RX ADMIN — CLOPIDOGREL BISULFATE 600 MILLIGRAM(S): 75 TABLET, FILM COATED ORAL at 10:51

## 2019-07-08 RX ADMIN — SODIUM CHLORIDE 3 MILLILITER(S): 9 INJECTION INTRAMUSCULAR; INTRAVENOUS; SUBCUTANEOUS at 12:42

## 2019-07-08 RX ADMIN — SODIUM CHLORIDE 3 MILLILITER(S): 9 INJECTION INTRAMUSCULAR; INTRAVENOUS; SUBCUTANEOUS at 05:09

## 2019-07-08 RX ADMIN — CLOPIDOGREL BISULFATE 75 MILLIGRAM(S): 75 TABLET, FILM COATED ORAL at 16:17

## 2019-07-08 NOTE — H&P ADULT - NSHPLABSRESULTS_GEN_ALL_CORE
Vital Signs Last 24 Hrs  T(C): 37.3 (08 Jul 2019 08:18), Max: 37.3 (08 Jul 2019 08:18)  T(F): 99.1 (08 Jul 2019 08:18), Max: 99.1 (08 Jul 2019 08:18)  HR: 90 (08 Jul 2019 08:18) (81 - 97)  BP: 137/86 (08 Jul 2019 08:18) (113/78 - 157/88)  BP(mean): --  RR: 17 (08 Jul 2019 08:18) (17 - 19)  SpO2: 95% (08 Jul 2019 08:18) (95% - 98%)    07-07    138  |  102  |  16  ----------------------------<  140<H>  4.2   |  20<L>  |  0.69    Ca    10.4      07 Jul 2019 09:58    TPro  7.7  /  Alb  4.8  /  TBili  0.3  /  DBili  x   /  AST  21  /  ALT  24  /  AlkPhos  112  07-07    MEDICATIONS  (STANDING):  aspirin  chewable 81 milliGRAM(s) Oral daily  clopidogrel Tablet 75 milliGRAM(s) Oral daily  clopidogrel Tablet 525 milliGRAM(s) Oral once  enoxaparin Injectable 40 milliGRAM(s) SubCutaneous every 24 hours  sodium chloride 0.9% lock flush 3 milliLiter(s) IV Push every 8 hours  triamterene 37.5 mG/hydrochlorothiazide 25 mG Capsule 1 Capsule(s) Oral daily    MEDICATIONS  (PRN):

## 2019-07-08 NOTE — CONSULT NOTE ADULT - PROBLEM SELECTOR RECOMMENDATION 9
follow up ECHO results   ILR implant tomorrow discussed risks and benefits pre and post op procedure care discussed with patient who is agreeable   will follow up in 2 weeks as out patient for wound check appointment

## 2019-07-08 NOTE — DISCHARGE NOTE PROVIDER - NSDCCPCAREPLAN_GEN_ALL_CORE_FT
PRINCIPAL DISCHARGE DIAGNOSIS  Diagnosis: Stroke  Assessment and Plan of Treatment: Please follow up with neurologist in 1 week. The office will call you to schedule an appointment, if you do not hear from them please call [phone]. Continue taking medications as prescribed. If you were prescribed a statin for your cholesterol please make sure you have your liver enzymes checked with your primary care physician. Monitor your blood pressure. Reduce fat, cholesterol and salt in your diet. Increase intake of fruits and vegetables. Limit alcohol to minimum and do not smoke. You may be at risk for falling, make changes to your home to help you walk easier. Keep up to date on vaccinations.  If you experience any symptoms of facial drooping, slurred speech, arm or leg weakness, severe headache, vision changes or any worsening symptoms, notify provider immediatley and return to ER. PRINCIPAL DISCHARGE DIAGNOSIS  Diagnosis: Stroke  Assessment and Plan of Treatment: Please follow up with neurologist in 1 week. The office will call you to schedule an appointment, if you do not hear from them please call [phone]. Continue taking medications as prescribed. If you were prescribed a statin for your cholesterol please make sure you have your liver enzymes checked with your primary care physician. Monitor your blood pressure. Reduce fat, cholesterol and salt in your diet. Increase intake of fruits and vegetables. Limit alcohol to minimum and do not smoke. You may be at risk for falling, make changes to your home to help you walk easier. Keep up to date on vaccinations.  If you experience any symptoms of facial drooping, slurred speech, arm or leg weakness, severe headache, vision changes or any worsening symptoms, notify provider immediately and return to ER. PRINCIPAL DISCHARGE DIAGNOSIS  Diagnosis: Stroke  Assessment and Plan of Treatment: Please follow up with neurologist in 1 week. Follow with Dr. Moreira in 102 weeks. The office will call you to schedule an appointment, if you do not hear from them please call [phone]. You will need a p2y12 level (plavix level) in 1-2 weeks.   Continue taking medications as prescribed. Aspirin 325mg and Plavix 75mg for 6 months.   You were prescribed a statin for your cholesterol please make sure you have your liver enzymes checked with your primary care physician, Dr. Lenz Last in 1-2 weeks.   Monitor your blood pressure. Reduce fat, cholesterol and salt in your diet. Increase intake of fruits and vegetables. Limit alcohol to minimum and do not smoke. You may be at risk for falling, make changes to your home to help you walk easier. Keep up to date on vaccinations.  If you experience any symptoms of facial drooping, slurred speech, arm or leg weakness, severe headache, vision changes or any worsening symptoms, notify provider immediately and return to ER. PRINCIPAL DISCHARGE DIAGNOSIS  Diagnosis: Stroke  Assessment and Plan of Treatment: Please follow up with neurologist in 1 week. Follow with Dr. Moreira in 1-2 weeks. The office will call you to schedule an appointment, if you do not hear from them please call [phone]. You will need a p2y12 level (plavix level) in 1-2 weeks.   Continue taking medications as prescribed. Aspirin 325mg and Plavix 75mg for 6 months.   You were prescribed a statin for your cholesterol please make sure you have your liver enzymes checked with your primary care physician, Dr. Lenz Last in 1-2 weeks.   Monitor your blood pressure. Reduce fat, cholesterol and salt in your diet. Increase intake of fruits and vegetables. Limit alcohol to minimum and do not smoke. You may be at risk for falling, make changes to your home to help you walk easier. Keep up to date on vaccinations.  If you experience any symptoms of facial drooping, slurred speech, arm or leg weakness, severe headache, vision changes or any worsening symptoms, notify provider immediately and return to ER.

## 2019-07-08 NOTE — DISCHARGE NOTE PROVIDER - CARE PROVIDERS DIRECT ADDRESSES
,DirectAddress_Unknown,[email or web address] ,DirectAddress_Unknown,[email or web address],DirectAddress_Unknown

## 2019-07-08 NOTE — H&P ADULT - NSHPPHYSICALEXAM_GEN_ALL_CORE
NEUROLOGICAL EXAM:  MS: AAOX3, mild dysarthria, normal attention, language and fund of knowledge.    CN: VFF, EOMI, PERRL, no LALO, no APD,  V1-3 intact, no facial asymmetry, t/p midline, SCM/trap intact.  Eyes-Fundi: no papilledema.  Motor: Strength: 5/5 4x. Tone: normal. Bulk: normal. DTR 2+ symm.  Plantar flex b/l. Sensation: intact to LT/PP/Vibration/Position/Temperature 4x. Decreased fine finger movement on the R. Mild clumsiness in R arm.   Coordination: intact 4x.   Gait:  Romberg negative

## 2019-07-08 NOTE — ED CDU PROVIDER SUBSEQUENT DAY NOTE - HISTORY
CDU PROGRESS NOTE RUSTY ACOSTA: Patient completed MRI. Pt resting in stretcher in NAD. Pt aox3. mild dysarthria, and decreased strength and coordination in RUE. Ambulatory around unit with steady gait. S1 S2 noted, RRR, lungs CTA b/l, BS x4 with soft, nontender abdomen. Pt without complaints. Will continue to monitor, awaiting MRI results.

## 2019-07-08 NOTE — CONSULT NOTE ADULT - ASSESSMENT
63 year old female  with PMH   former smoker with lung cancer s/p RUL lobectomy with chemo, COPD, HTN, HCV with liver cirrhosis, 10mm Left  MCA aneurysm on CTA 5/2019 s/p web embolization on 6/21/19 who presented with L frontal HA, slurred speech and R hand weakness since 3:30 this morning. Symptoms have largely resolved. CTH and CTA H/N without acute abnormality, with stenosis of distal R MCA branch. Symptoms concerning for L MCA ischemic stroke, EP consult for ILR implant

## 2019-07-08 NOTE — CONSULT NOTE ADULT - SUBJECTIVE AND OBJECTIVE BOX
Cardiology Consult  Faculty Cardiology  ==========================================================================    Chief Complaint:     HPI:  63Y R handed  female with PMH HTN (on ASA 325mg), COPD not on home O2, former smoker with lung cancer s/p RUL lobectomy with chemo, HCV with liver cirrhosis, incidentally found to have 10mm L MCA aneurysm on CTA 2019 s/p web embolization on 19 who presented with L frontal HA, slurred speech and R hand weakness since 3:30 morning PTA. Patient stated that she woke up and started talking to her cat and realized she was unable to articulate the words. When she tried to feed her cat, her R hand felt very weak and was unable to open the cat food properly. She states that she started feeling better around 9am. She had a similar episode s/p her recent embolization procedure in 2019 when she noticed she had some slurred speech, but no weakness or other neurologic deficits. Symptoms self resolved over the next 1-2 days. She had an additional episode of L sided frontal HA with R hand numbness and weakness last Saturday that self resolved as well.   Stroke code not called when patient arrived as outside of tPA window. (2019 11:38)    PMH:   Cerebral aneurysm  Liver cirrhosis  HTN (hypertension)  Lung cancer  Hepatitis C  COPD with emphysema    PSH:   History of eye surgery  H/O ganglion cyst  History of liver biopsy  S/P lobectomy of lung  S/P tonsillectomy    Medications:   aspirin  chewable 81 milliGRAM(s) Oral daily  clopidogrel Tablet 75 milliGRAM(s) Oral daily  enoxaparin Injectable 40 milliGRAM(s) SubCutaneous every 24 hours  sodium chloride 0.9% lock flush 3 milliLiter(s) IV Push every 8 hours  triamterene 37.5 mG/hydrochlorothiazide 25 mG Capsule 1 Capsule(s) Oral daily    Allergies:  penicillins (Rash)    FAMILY HISTORY:    Social History:  Smoking: No active  Alcohol:  Drugs:    Review of Systems:  Constitutional: [ ] Fever [ ] Chills [ ] Fatigue [ ] Weight change   HEENT: [ ] Blurred vision [ ] Eye Pain [ ] Headache [ ] Runny nose [ ] Sore Throat   Respiratory: [ ] Cough [ ] Wheezing [ ] Shortness of breath  Cardiovascular: [ ] Chest Pain [ ] Palpitations [ ] GREY [ ] PND [ ] Orthopnea  Gastrointestinal: [ ] Abdominal Pain [ ] Diarrhea [ ] Constipation [ ] Hemorrhoids [ ] Nausea [ ] Vomiting  Genitourinary: [ ] Nocturia [ ] Dysuria [ ] Incontinence  Extremities: [ ] Swelling [ ] Joint Pain  Neurologic: [ ] Focal deficit [ ] Paresthesias [ ] Syncope  Lymphatic: [ ] Swelling [ ] Lymphadenopathy   Skin: [ ] Rash [ ] Ecchymoses [ ] Wounds [ ] Lesions  Psychiatry: [ ] Depression [ ] Suicidal/Homicidal Ideation [ ] Anxiety [ ] Sleep Disturbances  [X ] 10 point review of systems is otherwise negative except as mentioned above            [ ]Unable to obtain    Physical Exam:  T(C): 36.5 (19 @ 15:41), Max: 37.3 (19 @ 08:18)  HR: 71 (19 @ 15:41) (71 - 94)  BP: 108/65 (19 @ 15:41) (108/65 - 171/91)  RR: 18 (19 @ 15:41) (17 - 18)  SpO2: 97% (19 @ 15:41) (95% - 98%)  Wt(kg): --    Daily     Daily     Appearance: [x ] Normal [x ] NAD  Eyes: [x ] PERRL [x ] EOMI  HENT: [x ] Normal oral muscosa [x ]NC/AT  Neck: [x] Supple [x] FROM [x] No JVD  Cardiovascular: [x ] S1, S2 [x ] RRR [x ] No m/r/g [x ]No edema  Respiratory: [x ] Clear to auscultation bilaterally [x ] Normal Effort  Gastrointestinal: [x ] Soft [x ] Non-tender [x ] Non-distended [x ] BS+  Musculoskeletal: [x ] No clubbing [x] No joint deformity   Neurologic: [x ] Non-focal  Lymphatic: [x ] No lymphadenopathy  Psychiatry: [x ] AAOx3 [x ] Mood & affect appropriate  Skin: [ x] No rashes [ x] No ecchymoses [x ] No cyanosis [x ] warm and dry    Procedural Access Site: [ ] No hematoma [ ] Non-tender to palpation [ ] 2+ pulse [ ] No bruit [ ] No Ecchymosis    Cardiovascular Diagnostic Testing:  ECG:    Echo:    Stress Testing:    Cath:    Interpretation of Telemetry:    Imaging:    Labs:                        15.3   9.6   )-----------( 294      ( 2019 09:58 )             45.2         138  |  102  |  16  ----------------------------<  140<H>  4.2   |  20<L>  |  0.69    Ca    10.4      2019 09:58    TPro  7.7  /  Alb  4.8  /  TBili  0.3  /  DBili  x   /  AST  21  /  ALT  24  /  AlkPhos  112  07    PT/INR - ( 2019 09:58 )   PT: 10.6 sec;   INR: 0.93 ratio         PTT - ( 2019 09:58 )  PTT:29.3 sec        Total Cholesterol: 269  LDL: Unable to calculate Test Repeated  LDL Cholesterol (mg/dL) --- Interpretive Comment (for adults 18 and over)  Optimal LDL Level may vary based on clinical situation  Below 70                   Ideal for people at very high risk of heart  disease  Below 100                  Ideal for people at risk of heart disease  100 - 129                    Near [place]  130 - 159                    Borderline high  160 - 189                    High  190 and Above           Very high  HDL: 37  T    Hemoglobin A1C, Whole Blood: 5.8 % ( @ 07:45) Cardiology Consult  Faculty Cardiology  ==========================================================================    Chief Complaint:     HPI:  63Y R handed  female with PMH HTN (on ASA 325mg), COPD not on home O2, former smoker with lung cancer s/p RUL lobectomy with chemo, HCV with liver cirrhosis, incidentally found to have 10mm L MCA aneurysm on CTA 2019 s/p web embolization on 19 who presented with L frontal HA, slurred speech and R hand weakness since 3:30 morning PTA. Patient stated that she woke up and started talking to her cat and realized she was unable to articulate the words. When she tried to feed her cat, her R hand felt very weak and was unable to open the cat food properly. She states that she started feeling better around 9am. She had a similar episode s/p her recent embolization procedure in 2019 when she noticed she had some slurred speech, but no weakness or other neurologic deficits. Symptoms self resolved over the next 1-2 days. She had an additional episode of L sided frontal HA with R hand numbness and weakness last Saturday that self resolved as well.   Stroke code not called when patient arrived as outside of tPA window. (2019 11:38)    PMH:   Cerebral aneurysm  Liver cirrhosis  HTN (hypertension)  Lung cancer  Hepatitis C  COPD with emphysema    PSH:   History of eye surgery  H/O ganglion cyst  History of liver biopsy  S/P lobectomy of lung  S/P tonsillectomy    Medications:   aspirin  chewable 81 milliGRAM(s) Oral daily  clopidogrel Tablet 75 milliGRAM(s) Oral daily  enoxaparin Injectable 40 milliGRAM(s) SubCutaneous every 24 hours  sodium chloride 0.9% lock flush 3 milliLiter(s) IV Push every 8 hours  triamterene 37.5 mG/hydrochlorothiazide 25 mG Capsule 1 Capsule(s) Oral daily    Allergies:  penicillins (Rash)    FAMILY HISTORY:    Social History:  Smoking: No active  Alcohol:  Drugs:    Review of Systems:  Constitutional: [ ] Fever [ ] Chills [ ] Fatigue [ ] Weight change   HEENT: [ ] Blurred vision [ ] Eye Pain [ ] Headache [ ] Runny nose [ ] Sore Throat   Respiratory: [ ] Cough [ ] Wheezing [ ] Shortness of breath  Cardiovascular: [ ] Chest Pain [ ] Palpitations [ ] GREY [ ] PND [ ] Orthopnea  Gastrointestinal: [ ] Abdominal Pain [ ] Diarrhea [ ] Constipation [ ] Hemorrhoids [ ] Nausea [ ] Vomiting  Genitourinary: [ ] Nocturia [ ] Dysuria [ ] Incontinence  Extremities: [ ] Swelling [ ] Joint Pain  Neurologic: [ ] Focal deficit [ ] Paresthesias [ ] Syncope  Lymphatic: [ ] Swelling [ ] Lymphadenopathy   Skin: [ ] Rash [ ] Ecchymoses [ ] Wounds [ ] Lesions  Psychiatry: [ ] Depression [ ] Suicidal/Homicidal Ideation [ ] Anxiety [ ] Sleep Disturbances  [X ] 10 point review of systems is otherwise negative except as mentioned above            [ ]Unable to obtain    Physical Exam:  T(C): 36.5 (19 @ 15:41), Max: 37.3 (19 @ 08:18)  HR: 71 (19 @ 15:41) (71 - 94)  BP: 108/65 (19 @ 15:41) (108/65 - 171/91)  RR: 18 (19 @ 15:41) (17 - 18)  SpO2: 97% (19 @ 15:41) (95% - 98%)  Wt(kg): --    Daily     Daily     Appearance: [x ] Normal [x ] NAD  Eyes: [x ] PERRL [x ] EOMI  HENT: [x ] Normal oral muscosa [x ]NC/AT  Neck: [x] Supple [x] FROM [x] No JVD  Cardiovascular: [x ] S1, S2 [x ] RRR [x ] No m/r/g [x ]No edema  Respiratory: [x ] Clear to auscultation bilaterally [x ] Normal Effort  Gastrointestinal: [x ] Soft [x ] Non-tender [x ] Non-distended [x ] BS+  Musculoskeletal: [x ] No clubbing [x] No joint deformity   Neurologic: [x ] Non-focal  Lymphatic: [x ] No lymphadenopathy  Psychiatry: [x ] AAOx3 [x ] Mood & affect appropriate  Skin: [ x] No rashes [ x] No ecchymoses [x ] No cyanosis [x ] warm and dry    Procedural Access Site: [ ] No hematoma [ ] Non-tender to palpation [ ] 2+ pulse [ ] No bruit [ ] No Ecchymosis    Cardiovascular Diagnostic Testing:  ECG: NSR. No acute ST-T changes    Echo:    Stress Testing:    Cath:    Interpretation of Telemetry:    Imaging:    Labs:                        15.3   9.6   )-----------( 294      ( 2019 09:58 )             45.2         138  |  102  |  16  ----------------------------<  140<H>  4.2   |  20<L>  |  0.69    Ca    10.4      2019 09:58    TPro  7.7  /  Alb  4.8  /  TBili  0.3  /  DBili  x   /  AST  21  /  ALT  24  /  AlkPhos  112  07-    PT/INR - ( 2019 09:58 )   PT: 10.6 sec;   INR: 0.93 ratio         PTT - ( 2019 09:58 )  PTT:29.3 sec        Total Cholesterol: 269  LDL: Unable to calculate Test Repeated  LDL Cholesterol (mg/dL) --- Interpretive Comment (for adults 18 and over)  Optimal LDL Level may vary based on clinical situation  Below 70                   Ideal for people at very high risk of heart  disease  Below 100                  Ideal for people at risk of heart disease  100 - 129                    Near [place]  130 - 159                    Borderline high  160 - 189                    High  190 and Above           Very high  HDL: 37  T    Hemoglobin A1C, Whole Blood: 5.8 % ( @ 07:45)

## 2019-07-08 NOTE — DISCHARGE NOTE PROVIDER - PROVIDER TOKENS
PROVIDER:[TOKEN:[[id number]],FOLLOWUP:[1 week]],PROVIDER:[TOKEN:[[id number]],FOLLOWUP:[2 weeks]] PROVIDER:[TOKEN:[[id number]],FOLLOWUP:[1 week]],PROVIDER:[TOKEN:[[id number]],FOLLOWUP:[2 weeks]],PROVIDER:[TOKEN:[[id number]],FOLLOWUP:[1 week]]

## 2019-07-08 NOTE — PROGRESS NOTE ADULT - SUBJECTIVE AND OBJECTIVE BOX
Patient seen and examined at bedside.    --Anticoagulation--  aspirin  chewable 81 milliGRAM(s) Oral daily  clopidogrel Tablet 75 milliGRAM(s) Oral daily    T(C): 36.8 (07-08-19 @ 03:19), Max: 36.8 (07-07-19 @ 19:42)  HR: 81 (07-08-19 @ 03:19) (81 - 97)  BP: 123/74 (07-08-19 @ 03:19) (113/78 - 161/91)  RR: 18 (07-08-19 @ 03:19) (17 - 19)  SpO2: 95% (07-08-19 @ 03:19) (95% - 98%)  Wt(kg): --    Exam:  AOx3, FC, PERRL, EOMI, no facial, trace hesitation when speaking at times  5/5 throughout, no drift  SILT  no clonus

## 2019-07-08 NOTE — ED ADULT NURSE REASSESSMENT NOTE - NIH STROKE SCALE: 10. DYSARTHRIA, QM
(1) Mild-to-moderate dysarthria; patient slurs at least some words and, at worst, can be understood with some difficulty
(1) Mild-to-moderate dysarthria; patient slurs at least some words and, at worst, can be understood with some difficulty
(0) Normal

## 2019-07-08 NOTE — DISCHARGE NOTE PROVIDER - CARE PROVIDER_API CALL
Loan Teran (NP; RN)  NP in Family Health  [address] [place] [address], [facility] [address]  [place] [address]  Phone: [phone]  Fax: [phone]  Follow Up Time: 1 week    Jaguar Moreira)  Neurological Surgery  [address] [place] [address]  Phone: [phone]  Fax: [phone]  Follow Up Time: 2 weeks Loan Teran (NP; RN)  NP in Family Health  [address] [facility], [address]  [place] [address]  Phone: [phone]  Fax: [phone]  Follow Up Time: 1 week    Jaguar Moreira)  Neurological Surgery  [address] [place] [address]  Phone: [phone]  Fax: [phone]  Follow Up Time: 2 weeks    Delfin Jonas)  Internal Medicine  [address] [place] [address]  Phone: [phone]  Fax: [phone]  Follow Up Time: 1 week

## 2019-07-08 NOTE — H&P ADULT - HISTORY OF PRESENT ILLNESS
63Y R handed  female with PMH HTN (on ASA 325mg), COPD not on home O2, former smoker with lung cancer s/p RUL lobectomy with chemo, HCV with liver cirrhosis, incidentally found to have 10mm L MCA aneurysm on CTA 5/2019 s/p web embolization on 6/21/19 who presented with L frontal HA, slurred speech and R hand weakness since 3:30 morning PTA. Patient stated that she woke up and started talking to her cat and realized she was unable to articulate the words. When she tried to feed her cat, her R hand felt very weak and was unable to open the cat food properly. She states that she started feeling better around 9am. She had a similar episode s/p her recent embolization procedure in June 2019 when she noticed she had some slurred speech, but no weakness or other neurologic deficits. Symptoms self resolved over the next 1-2 days. She had an additional episode of L sided frontal HA with R hand numbness and weakness last Saturday that self resolved as well.   Stroke code not called when patient arrived as outside of tPA window.

## 2019-07-08 NOTE — PHYSICAL THERAPY INITIAL EVALUATION ADULT - ADDITIONAL COMMENTS
Pt A&Ox4. Pt stated that she lives with  and grown son in a duplex apartment. 3 steps to enter. 13 steps to bedroom. Pt is independent in all functional activities and ADLs.

## 2019-07-08 NOTE — CONSULT NOTE ADULT - ASSESSMENT
63Y R handed  female with PMH HTN (on ASA 325mg), COPD not on home O2, former smoker with lung cancer s/p RUL lobectomy with chemo, HCV with liver cirrhosis, incidentally found to have 10mm L MCA aneurysm on CTA 5/2019 s/p web embolization on 6/21/19 who presented with L frontal HA, slurred speech and R hand weakness since 3:30 morning PTA  ·	Given CVA agree with echo.   ·	Given likely embolic ILR to exclude AF.   ·	TG elevated. Unable to calculate LDL. Check direct LDL start statin  ·	Antiplatelets as per Neuro  ·	Monitor BP. Further med titration pending results.     =======================================================================  Ambrocio Iniguez MD FAC    Please page [phone] with questions  On nights and weekend please call the office [phone].  For all Cardiology service contact information, go to [email or web address] and use "[email or web address]" to login.

## 2019-07-08 NOTE — ED CDU PROVIDER SUBSEQUENT DAY NOTE - PROGRESS NOTE DETAILS
CDU PROGRESS NOTE PA DAVE: MRI head shows Acute cortical/subcortical white matter infarcts left   parietal lobe.  Subacute infarcts in the left occipital lobe. Patient currently being evaluated by Neurology at bedside. Will discuss with Dr. Moreira CDU NOTE RUSTY Holloway: pt resting comfortably, still reports R hand clumsiness/difficulty with  and minimal speech slurring. no new complaints.  NAD VSS. no events on tele. neuro exam - R hand difficulty with coordination noted, 4/5  strength R side, mild speech slurring, otherwise normal exam. MRI shows new strokes. will discuss with Neuro and Neurosurgery. CDU NOTE RUSTY Holloway: spoke with Neurosurgery- recommended Plavix loading dose 600mg po x 1 and will follow pt inpt. spoke with Neuro - ok to admit to Neuro service Dr. Oquendo.

## 2019-07-08 NOTE — PROGRESS NOTE ADULT - ASSESSMENT
Bree Doss  63F pmhx HTN, COPD, Lung CA s/p RUL lobectomy (2018) and chemo, HCV c/b liver cirrhosis s/p WEB 6/21/19 with transient post op WFD and R hand weakness presents for reoccurrence of transient symptoms this morning with headache. Now back to baseline with very slight hesitation when speaking at times, on CCS802.  - CTH and CTA look grossly normal with good arborization distal to WEB, read as stenosis of distal right MCA branch vasculature  - Will stay for observation overnight   - MRI/MRA head wo completed. Will discuss with Dr. Moreira

## 2019-07-08 NOTE — CONSULT NOTE ADULT - SUBJECTIVE AND OBJECTIVE BOX
EP Consult Note:      HPI: 63 year old female  with PMH HTN (on ASA 325mg), COPD not on home O2, former smoker with lung cancer s/p RUL lobectomy with chemo, HCV with liver cirrhosis, incidentally found to have 10mm L MCA aneurysm on CTA 5/2019 s/p web embolization on 6/21/19 who presented with L frontal HA, slurred speech and R hand weakness since 3:30 this morning. Symptoms have largely resolved. CTH and CTA H/N without acute abnormality, with stenosis of distal R MCA branch. Symptoms concerning for L MCA ischemic stroke     Vital Signs Last 24 Hrs  T(C): 36.7 (08 Jul 2019 14:00), Max: 37.3 (08 Jul 2019 08:18)  T(F): 98.1 (08 Jul 2019 14:00), Max: 99.1 (08 Jul 2019 08:18)  HR: 94 (08 Jul 2019 14:00) (81 - 94)  BP: 148/91 (08 Jul 2019 14:00) (113/78 - 171/91)  RR: 18 (08 Jul 2019 14:00) (17 - 18)  SpO2: 97% (08 Jul 2019 14:00) (95% - 98%)    General: Alert. NAD  COR: +S1S2 RRR no m/g/r  Lungs: CTA  Abd: Soft. NT. ND. +BS  Ext: No edema  Neuro: Grossly intact  Psych: A&Ox3.     Labs:  CBC Full  -  ( 07 Jul 2019 09:58 )  WBC Count : 9.6 K/uL  Hemoglobin : 15.3 g/dL  Hematocrit : 45.2 %  Platelet Count - Automated : 294 K/uL  Mean Cell Volume : 87.3 fl  Mean Cell Hemoglobin : 29.6 pg  Mean Cell Hemoglobin Concentration : 33.9 gm/dL  Auto Neutrophil # : 5.4 K/uL  Auto Lymphocyte # : 2.9 K/uL  Auto Monocyte # : 0.5 K/uL  Auto Eosinophil # : 0.6 K/uL  Auto Basophil # : 0.1 K/uL  Auto Neutrophil % : 56.8 %  Auto Lymphocyte % : 30.2 %  Auto Monocyte % : 5.4 %  Auto Eosinophil % : 6.6 %  Auto Basophil % : 1.0 %      07-07    138  |  102  |  16  ----------------------------<  140<H>  4.2   |  20<L>  |  0.69    Ca    10.4      07 Jul 2019 09:58    TPro  7.7  /  Alb  4.8  /  TBili  0.3  /  DBili  x   /  AST  21  /  ALT  24  /  AlkPhos  112  07-07    Telemetry Findings:  ECG Findings:  < from: 12 Lead ECG (07.07.19 @ 10:00) >  NORMAL SINUS RHYTHM 94 bpm  NONSPECIFIC ST ABNORMALITY  ABNORMAL ECG      Echo Findings:      MRI Findings:    < from: MR Head w/wo IV Cont (07.07.19 @ 23:17) >    Restricted diffusion with hyperintense DWI, T2, and FLAIR signal in the   left posterior frontal and right parietal pre and postcentral gyrus with   edema and mass effect consistent with evolving ischemic change without   hemorrhagic transformation.    Ventricles and sulci are unchanged, demonstration of microvascular   disease and remote lacunar infarcts. No new large hemorrhage or midline   shift. Basal cisterns remain patent.    Redemonstration unchanged left MCA bifurcation aneurysm embolization   causing susceptibly artifact, there are more pronounced stenosis of the   left M2 MCA branches, and decreased size and number of distal left M3 MCA   branch vasculature.    Unchanged bulbous right M1 M2 junction, suggest follow up to ensure   stability, tortuous intracranial circulation likely reflecting   hypertension.    CXR: < from: Xray Chest 1 View AP/PA (07.07.19 @ 10:03) >    IMPRESSION: No implanted devices   The lungs appear to be clear. EP Consult Note: ILR      HPI: 63 year old female  with PMH HTN (on ASA 325mg), COPD not on home O2, former smoker with lung cancer s/p RUL lobectomy with chemo, HCV with liver cirrhosis, incidentally found to have 10mm L MCA aneurysm on CTA 5/2019 s/p web embolization on 6/21/19 who presented with L frontal HA, slurred speech and R hand weakness since 3:30 this morning. Symptoms have largely resolved. CTH and CTA H/N without acute abnormality, with stenosis of distal R MCA branch. Symptoms concerning for L MCA ischemic stroke     Vital Signs Last 24 Hrs  T(C): 36.7 (08 Jul 2019 14:00), Max: 37.3 (08 Jul 2019 08:18)  T(F): 98.1 (08 Jul 2019 14:00), Max: 99.1 (08 Jul 2019 08:18)  HR: 94 (08 Jul 2019 14:00) (81 - 94)  BP: 148/91 (08 Jul 2019 14:00) (113/78 - 171/91)  RR: 18 (08 Jul 2019 14:00) (17 - 18)  SpO2: 97% (08 Jul 2019 14:00) (95% - 98%)    General: Alert. NAD  COR: +S1S2 RRR no m/g/r  Lungs: CTA  Abd: Soft. NT. ND. +BS  Ext: No edema  Neuro: mildly slurred speech , right hand weakness  Psych: A&Ox3.     Labs:  CBC Full  -  ( 07 Jul 2019 09:58 )  WBC Count : 9.6 K/uL  Hemoglobin : 15.3 g/dL  Hematocrit : 45.2 %  Platelet Count - Automated : 294 K/uL  Mean Cell Volume : 87.3 fl  Mean Cell Hemoglobin : 29.6 pg  Mean Cell Hemoglobin Concentration : 33.9 gm/dL  Auto Neutrophil # : 5.4 K/uL  Auto Lymphocyte # : 2.9 K/uL  Auto Monocyte # : 0.5 K/uL  Auto Eosinophil # : 0.6 K/uL  Auto Basophil # : 0.1 K/uL  Auto Neutrophil % : 56.8 %  Auto Lymphocyte % : 30.2 %  Auto Monocyte % : 5.4 %  Auto Eosinophil % : 6.6 %  Auto Basophil % : 1.0 %      07-07    138  |  102  |  16  ----------------------------<  140<H>  4.2   |  20<L>  |  0.69    Ca    10.4      07 Jul 2019 09:58    TPro  7.7  /  Alb  4.8  /  TBili  0.3  /  DBili  x   /  AST  21  /  ALT  24  /  AlkPhos  112  07-07    Telemetry Findings:  ECG Findings:  < from: 12 Lead ECG (07.07.19 @ 10:00) >  NORMAL SINUS RHYTHM 94 bpm  NONSPECIFIC ST ABNORMALITY  ABNORMAL ECG      Echo Findings:      MRI Findings:    < from: MR Head w/wo IV Cont (07.07.19 @ 23:17) >    Restricted diffusion with hyperintense DWI, T2, and FLAIR signal in the   left posterior frontal and right parietal pre and postcentral gyrus with   edema and mass effect consistent with evolving ischemic change without   hemorrhagic transformation.    Ventricles and sulci are unchanged, demonstration of microvascular   disease and remote lacunar infarcts. No new large hemorrhage or midline   shift. Basal cisterns remain patent.    Redemonstration unchanged left MCA bifurcation aneurysm embolization   causing susceptibly artifact, there are more pronounced stenosis of the   left M2 MCA branches, and decreased size and number of distal left M3 MCA   branch vasculature.    Unchanged bulbous right M1 M2 junction, suggest follow up to ensure   stability, tortuous intracranial circulation likely reflecting   hypertension.    CXR: < from: Xray Chest 1 View AP/PA (07.07.19 @ 10:03) >    IMPRESSION: No implanted devices   The lungs appear to be clear.

## 2019-07-08 NOTE — H&P ADULT - ATTENDING COMMENTS
I have seen and examined this patient with the stroke neurology team.     Overnight events were reviewed with the patient and/or available family members.   ROS: All negative except documented in the HPI.   Neurological exam was performed and agree with exam as documented above.   Laboratory results and imaging studies were reviewed by me.   I agree with the neurology resident note as documented above.     63 years old woman with multiple vascular risk factors including age, HTN and COPD and a history of 10 mm left MCA aneurysm s/p embolization on 6/21/19 is evaluated at [facility] for an acute onset of right hand clumsiness/weakness and slurring of speech noted upon waking up in the morning of 7/7 at around 3:30 AM. CT brain on admission did not show any evidence of an acute infarct or hemorrhage. CTA head and neck showed artifact from WEB placement in the left MCA bifurcation aneurysm but was otherwise grossly unremarkable. MRI brain showed an acute/subacute embolic-looking infarcts in the left MCA distribution.     Impression:  Cerebral embolism with cerebral infarction. Left MCA distribution stroke -  likely mechanism being periprocedural stroke probably related to micro-thrombi being formed over the surface of WEB with distal embolization in the left MCA distribution versus an embolic stroke from undetermined source, favoring the former     Plan:  - Aspirin and clopidogrel (load with 600 mg due to concerns for periprocedural stroke with concerns for thrombi formation over the surface of WEB - as recommended by Dr. Moreira/neuro interventionist) for at least 6 months followed by aspirin   - Atorvastatin 80 mg at bedtime once able to pass the swallow evaluation or enteral access is established; titrate the dose according to LDL  - HbA1C and LDL, continue with aggressive vascular risk factors modifications   - Permissive HTN up to 220/110 mmHg for first 48-72 hours from symptom onset followed by gradual normotension  - TTE with bubble study and continue with telemetry to screen for possible cardiac source of embolism  - Prolonged cardiac monitoring with ICM to screen for occult cardiac arrhythmia like atrial fibrillation being the cause of possible cardiac source of embolism to be considered based on results of above mentioned cardiac tests. Considering recurrent embolic-looking stroke and potential implications for choosing measures of secondary stroke prevention in this patient if diagnosed to have atrial fibrillation, benefits of undergoing prolonged cardiac monitoring with ICM placement are thought to outweigh potential risks, which were discussed with patient and her  at bedside in detail   - PT/OT/Speech and swallow/safety eval  - Stroke education  - Anticipate discharge tomorrow    Above-mentioned plan was discussed with patient and her  in detail. All the questions were answered and concerns were addressed.

## 2019-07-08 NOTE — H&P ADULT - ASSESSMENT
63Y R handed  female with PMH HTN (on ASA 325mg), COPD not on home O2, former smoker with lung cancer s/p RUL lobectomy with chemo, HCV with liver cirrhosis, incidentally found to have 10mm L MCA aneurysm on CTA 5/2019 s/p web embolization on 6/21/19 who presented with L frontal HA, slurred speech and R hand weakness since 3:30 this morning. Symptoms have largely resolved. CTH and CTA H/N without acute abnormality, with stenosis of distal R MCA branch. Symptoms concerning for L MCA ischemic stroke    Plan:  - MRI Brain w/o contrast showing embolic left MCA territory infarct  - MRA Brain stable  - Loaded w/ Plavix 600mg once  - ASA 81mg daily and Plavix 75mg daily for 6 weeks, followed by ASA 81mg alone  - A1c: 5.8  - LDL: too high to count  - Lipitor 80mg daily  - TTE with bubble study  - Loop  - PT/OT  - Speech

## 2019-07-08 NOTE — DISCHARGE NOTE PROVIDER - HOSPITAL COURSE
63Y R handed  female with PMH HTN (on ASA 325mg), COPD not on home O2, former smoker with lung cancer s/p RUL lobectomy with chemo, HCV with liver cirrhosis, incidentally found to have 10mm L MCA aneurysm on CTA 5/2019 s/p web embolization on 6/21/19 who presented with L frontal HA, slurred speech and R hand weakness since 3:30 morning PTA. Patient stated that she woke up and started talking to her cat and realized she was unable to articulate the words. When she tried to feed her cat, her R hand felt very weak and was unable to open the cat food properly. She states that she started feeling better around 9am. She had a similar episode s/p her recent embolization procedure in June 2019 when she noticed she had some slurred speech, but no weakness or other neurologic deficits. Symptoms self resolved over the next 1-2 days. She had an additional episode of L sided frontal HA with R hand numbness and weakness last Saturday that self resolved as well. Stroke code not called when patient arrived as outside of tPA window.         IMAGES:     (07/07/19)    CT brain: Status post left MCA bifurcation aneurysm embolization with     redemonstration of areas of slight decreased attenuation along the left     posterior frontal lobe involving the pre and postcentral gyrus, in the     region of previously demonstrated restricted diffusion on MRI of     6/22/2019, small foci of developing ischemia not excluded, repeat MRI may     be obtained for improved assessment as clinically warranted.. There is no     new intracranial hemorrhage, extra-axial collection, midline shift,     central herniation, hydrocephalus or transcortical infarct.         CT angiography neck: No  hemodynamically significant stenosis by NASCET     criteria. No evidence of vascular dissection.        CT angiography brain: Status post left MCA bifurcation aneurysm     embolization with a  stenosis of the distal right MCA branch vasculature.     Dolichoectatic intracranial circulation may reflect hypertension. Bulbous     distal right M1 and junction of right M2, suggest CT follow-up to ensure     stability.        MRI:    Restricted diffusion with hyperintense DWI, T2, and FLAIR signal in the     left posterior frontal and right parietal pre and postcentral gyrus with     edema and mass effect consistent with evolving ischemic change without     hemorrhagic transformation.    Ventricles and sulci are unchanged, demonstration of microvascular     disease and remote lacunar infarcts. No new large hemorrhage or midline     shift. Basal cisterns remain patent.    Redemonstration unchanged left MCA bifurcation aneurysm embolization     causing susceptibly artifact, there are more pronounced stenosis of the     left M2 MCA branches, and decreased size and number of distal left M3 MCA     branch vasculature.    Unchanged bulbous right M1 M2 junction, suggest follow up to ensure     stability, tortuous intracranial circulation likely reflecting     hypertension. 63 years old woman with multiple vascular risk factors including age, HTN and COPD and a history of 10 mm left MCA aneurysm s/p embolization on 6/21/19 is evaluated at [facility] for an acute onset of right hand clumsiness/weakness and slurring of speech noted upon waking up in the morning of 7/7 at around 3:30 AM. CT brain on admission did not show any evidence of an acute infarct or hemorrhage. CTA head and neck showed artifact from WEB placement in the left MCA bifurcation aneurysm but was otherwise grossly unremarkable. MRI brain showed an acute/subacute embolic-looking infarcts in the left MCA distribution.         Impression:    Cerebral embolism with cerebral infarction. Left MCA distribution stroke -  likely mechanism being periprocedural stroke probably related to micro-thrombi being formed over the surface of WEB with distal embolization in the left MCA distribution versus an embolic stroke from undetermined source, favoring the former         IMAGES:     (07/07/19)    CT brain: Status post left MCA bifurcation aneurysm embolization with     redemonstration of areas of slight decreased attenuation along the left     posterior frontal lobe involving the pre and postcentral gyrus, in the     region of previously demonstrated restricted diffusion on MRI of     6/22/2019, small foci of developing ischemia not excluded, repeat MRI may     be obtained for improved assessment as clinically warranted.. There is no     new intracranial hemorrhage, extra-axial collection, midline shift,     central herniation, hydrocephalus or transcortical infarct.         CT angiography neck: No  hemodynamically significant stenosis by NASCET     criteria. No evidence of vascular dissection.        CT angiography brain: Status post left MCA bifurcation aneurysm     embolization with a  stenosis of the distal right MCA branch vasculature.     Dolichoectatic intracranial circulation may reflect hypertension. Bulbous     distal right M1 and junction of right M2, suggest CT follow-up to ensure     stability.        MRI:    Restricted diffusion with hyperintense DWI, T2, and FLAIR signal in the     left posterior frontal and right parietal pre and postcentral gyrus with     edema and mass effect consistent with evolving ischemic change without     hemorrhagic transformation.    Ventricles and sulci are unchanged, demonstration of microvascular     disease and remote lacunar infarcts. No new large hemorrhage or midline     shift. Basal cisterns remain patent.    Redemonstration unchanged left MCA bifurcation aneurysm embolization     causing susceptibly artifact, there are more pronounced stenosis of the     left M2 MCA branches, and decreased size and number of distal left M3 MCA     branch vasculature.    Unchanged bulbous right M1 M2 junction, suggest follow up to ensure     stability, tortuous intracranial circulation likely reflecting     hypertension.        She was started on aspirin and clopidogrel 75mg (load with 600 mg due to concerns for periprocedural stroke with concerns for thrombi formation over the surface of WEB - as recommended by Dr. Moreira/neuro interventionist) for at least 6 months followed by aspirin         She has a hx of lung ca s/p resection, she follows closely with oncology and states negative PET scan recently. Encouraged ongoing followup.         TTE showed    She underwent ICM to screen for occult cardiac arrhythmia like atrial fibrillation being the cause of possible cardiac source of embolism. Potential risks were discussed with patient and her  at bedside in detail.                            PT/OT recommended home. She is stable for discharge with outpatient followup. 63 years old woman with multiple vascular risk factors including age, HTN and COPD and a history of 10 mm left MCA aneurysm s/p embolization on 6/21/19 is evaluated at [facility] for an acute onset of right hand clumsiness/weakness and slurring of speech noted upon waking up in the morning of 7/7 at around 3:30 AM. CT brain on admission did not show any evidence of an acute infarct or hemorrhage. CTA head and neck showed artifact from WEB placement in the left MCA bifurcation aneurysm but was otherwise grossly unremarkable. MRI brain showed an acute/subacute embolic-looking infarcts in the left MCA distribution.         Impression:    Cerebral embolism with cerebral infarction. Left MCA distribution stroke -  likely mechanism being periprocedural stroke probably related to micro-thrombi being formed over the surface of WEB with distal embolization in the left MCA distribution versus an embolic stroke from undetermined source, favoring the former         IMAGES:     (07/07/19)    CT brain: Status post left MCA bifurcation aneurysm embolization with     redemonstration of areas of slight decreased attenuation along the left     posterior frontal lobe involving the pre and postcentral gyrus, in the     region of previously demonstrated restricted diffusion on MRI of     6/22/2019, small foci of developing ischemia not excluded, repeat MRI may     be obtained for improved assessment as clinically warranted.. There is no     new intracranial hemorrhage, extra-axial collection, midline shift,     central herniation, hydrocephalus or transcortical infarct.         CT angiography neck: No  hemodynamically significant stenosis by NASCET     criteria. No evidence of vascular dissection.        CT angiography brain: Status post left MCA bifurcation aneurysm     embolization with a  stenosis of the distal right MCA branch vasculature.     Dolichoectatic intracranial circulation may reflect hypertension. Bulbous     distal right M1 and junction of right M2, suggest CT follow-up to ensure     stability.        MRI:    Restricted diffusion with hyperintense DWI, T2, and FLAIR signal in the     left posterior frontal and right parietal pre and postcentral gyrus with     edema and mass effect consistent with evolving ischemic change without     hemorrhagic transformation.    Ventricles and sulci are unchanged, demonstration of microvascular     disease and remote lacunar infarcts. No new large hemorrhage or midline     shift. Basal cisterns remain patent.    Redemonstration unchanged left MCA bifurcation aneurysm embolization     causing susceptibly artifact, there are more pronounced stenosis of the     left M2 MCA branches, and decreased size and number of distal left M3 MCA     branch vasculature.    Unchanged bulbous right M1 M2 junction, suggest follow up to ensure     stability, tortuous intracranial circulation likely reflecting     hypertension.        She was started on aspirin and clopidogrel 75mg (load with 600 mg due to concerns for periprocedural stroke with concerns for thrombi formation over the surface of WEB - as recommended by Dr. Moreira/neuro interventionist) for at least 6 months followed by aspirin         She has a hx of lung ca s/p resection, she follows closely with oncology and states negative PET scan recently. Encouraged ongoing followup.         TTE showed        - history of cirrhosis RUQ showed        She underwent ICM to screen for occult cardiac arrhythmia like atrial fibrillation being the cause of possible cardiac source of embolism. Potential risks were discussed with patient and her  at bedside in detail.                            PT/OT recommended home. She is stable for discharge with outpatient followup. 63 years old woman with multiple vascular risk factors including age, HTN and COPD and a history of 10 mm left MCA aneurysm s/p embolization on 6/21/19 is evaluated at [facility] for an acute onset of right hand clumsiness/weakness and slurring of speech noted upon waking up in the morning of 7/7 at around 3:30 AM. CT brain on admission did not show any evidence of an acute infarct or hemorrhage. CTA head and neck showed artifact from WEB placement in the left MCA bifurcation aneurysm but was otherwise grossly unremarkable. MRI brain showed an acute/subacute embolic-looking infarcts in the left MCA distribution.         Impression:    Cerebral embolism with cerebral infarction. Left MCA distribution stroke -  likely mechanism being periprocedural stroke probably related to micro-thrombi being formed over the surface of WEB with distal embolization in the left MCA distribution versus an embolic stroke from undetermined source, favoring the former         IMAGES:     (07/07/19)    CT brain: Status post left MCA bifurcation aneurysm embolization with     redemonstration of areas of slight decreased attenuation along the left     posterior frontal lobe involving the pre and postcentral gyrus, in the     region of previously demonstrated restricted diffusion on MRI of     6/22/2019, small foci of developing ischemia not excluded, repeat MRI may     be obtained for improved assessment as clinically warranted.. There is no     new intracranial hemorrhage, extra-axial collection, midline shift,     central herniation, hydrocephalus or transcortical infarct.         CT angiography neck: No  hemodynamically significant stenosis by NASCET     criteria. No evidence of vascular dissection.        CT angiography brain: Status post left MCA bifurcation aneurysm     embolization with a  stenosis of the distal right MCA branch vasculature.     Dolichoectatic intracranial circulation may reflect hypertension. Bulbous     distal right M1 and junction of right M2, suggest CT follow-up to ensure     stability.        MRI:    Restricted diffusion with hyperintense DWI, T2, and FLAIR signal in the     left posterior frontal and right parietal pre and postcentral gyrus with     edema and mass effect consistent with evolving ischemic change without     hemorrhagic transformation.    Ventricles and sulci are unchanged, demonstration of microvascular     disease and remote lacunar infarcts. No new large hemorrhage or midline     shift. Basal cisterns remain patent.    Redemonstration unchanged left MCA bifurcation aneurysm embolization     causing susceptibly artifact, there are more pronounced stenosis of the     left M2 MCA branches, and decreased size and number of distal left M3 MCA     branch vasculature.    Unchanged bulbous right M1 M2 junction, suggest follow up to ensure     stability, tortuous intracranial circulation likely reflecting     hypertension.        She was started on aspirin and clopidogrel 75mg (load with 600 mg due to concerns for periprocedural stroke with concerns for thrombi formation over the surface of WEB - as recommended by Dr. Moreira/neuro interventionist) for at least 6 months followed by aspirin         She has a hx of lung ca s/p resection, she follows closely with oncology and states negative PET scan recently. Encouraged ongoing followup.         TTE showed normal left ventricular systolic function. No segmental wall motion abnormalities. Endocardial visualization enhanced with intravenous injection of Ultrasonic Enhancing Agent (Definity). Grossly normal right ventricular systolic function. Recommended to have 30 day holter monitor.         - history of cirrhosis for which she is being followed by her primary care physician. RUQ showed        She underwent ICM to screen for occult cardiac arrhythmia like atrial fibrillation being the cause of possible cardiac source of embolism. Potential risks were discussed with patient and her  at bedside in detail.                            PT/OT recommended home. She is stable for discharge with outpatient followup. 63 years old woman with multiple vascular risk factors including age, HTN and COPD and a history of 10 mm left MCA aneurysm s/p embolization on 6/21/19 is evaluated at [facility] for an acute onset of right hand clumsiness/weakness and slurring of speech noted upon waking up in the morning of 7/7 at around 3:30 AM. CT brain on admission did not show any evidence of an acute infarct or hemorrhage. CTA head and neck showed artifact from WEB placement in the left MCA bifurcation aneurysm but was otherwise grossly unremarkable. MRI brain showed an acute/subacute embolic-looking infarcts in the left MCA distribution.         Impression:    Cerebral embolism with cerebral infarction. Left MCA distribution stroke -  likely mechanism being periprocedural stroke probably related to micro-thrombi being formed over the surface of WEB with distal embolization in the left MCA distribution versus an embolic stroke from undetermined source, favoring the former         IMAGES:     (07/07/19)    CT brain: Status post left MCA bifurcation aneurysm embolization with     redemonstration of areas of slight decreased attenuation along the left     posterior frontal lobe involving the pre and postcentral gyrus, in the     region of previously demonstrated restricted diffusion on MRI of     6/22/2019, small foci of developing ischemia not excluded, repeat MRI may     be obtained for improved assessment as clinically warranted.. There is no     new intracranial hemorrhage, extra-axial collection, midline shift,     central herniation, hydrocephalus or transcortical infarct.         CT angiography neck: No  hemodynamically significant stenosis by NASCET     criteria. No evidence of vascular dissection.        CT angiography brain: Status post left MCA bifurcation aneurysm     embolization with a  stenosis of the distal right MCA branch vasculature.     Dolichoectatic intracranial circulation may reflect hypertension. Bulbous     distal right M1 and junction of right M2, suggest CT follow-up to ensure     stability.        MRI:    Restricted diffusion with hyperintense DWI, T2, and FLAIR signal in the     left posterior frontal and right parietal pre and postcentral gyrus with     edema and mass effect consistent with evolving ischemic change without     hemorrhagic transformation.    Ventricles and sulci are unchanged, demonstration of microvascular     disease and remote lacunar infarcts. No new large hemorrhage or midline     shift. Basal cisterns remain patent.    Redemonstration unchanged left MCA bifurcation aneurysm embolization     causing susceptibly artifact, there are more pronounced stenosis of the     left M2 MCA branches, and decreased size and number of distal left M3 MCA     branch vasculature.    Unchanged bulbous right M1 M2 junction, suggest follow up to ensure     stability, tortuous intracranial circulation likely reflecting     hypertension.        She was started on aspirin and clopidogrel 75mg (load with 600 mg due to concerns for periprocedural stroke with concerns for thrombi formation over the surface of WEB - as recommended by Dr. Moreira/neuro interventionist) for at least 6 months followed by aspirin         She has a hx of lung ca s/p resection, she follows closely with oncology and states negative PET scan recently. Encouraged ongoing followup.         TTE showed normal left ventricular systolic function. No segmental wall motion abnormalities. Endocardial visualization enhanced with intravenous injection of Ultrasonic Enhancing Agent (Definity). Grossly normal right ventricular systolic function. Recommended to have 30 day holter monitor.         - history of cirrhosis for which she is being followed by her primary care physician. RUQ showed cirrhosis. LFTs within normal range.  She was prescribed a liptior 20mg on discharge. This was discussed with her primary care physician Dr. Jonas who agreed. She will have repeat LFTs in 1 week. If she is able to tolerate the statin he will increase the dose as outpatient.         She underwent ICM to screen for occult cardiac arrhythmia like atrial fibrillation being the cause of possible cardiac source of embolism. Potential risks were discussed with patient and her  at bedside in detail.                            PT/OT recommended home. She is stable for discharge with outpatient followup.

## 2019-07-09 ENCOUNTER — TRANSCRIPTION ENCOUNTER (OUTPATIENT)
Age: 64
End: 2019-07-09

## 2019-07-09 VITALS
DIASTOLIC BLOOD PRESSURE: 85 MMHG | OXYGEN SATURATION: 96 % | HEART RATE: 104 BPM | SYSTOLIC BLOOD PRESSURE: 140 MMHG | TEMPERATURE: 98 F | RESPIRATION RATE: 16 BRPM

## 2019-07-09 LAB
DRUG SCREEN, SERUM: SIGNIFICANT CHANGE UP
LDLC SERPL DIRECT ASSAY-MCNC: 208 MG/DL — SIGNIFICANT CHANGE UP
PA ADP PRP-ACNC: 1 PRU — LOW (ref 194–417)

## 2019-07-09 PROCEDURE — 93306 TTE W/DOPPLER COMPLETE: CPT | Mod: 26

## 2019-07-09 PROCEDURE — 71045 X-RAY EXAM CHEST 1 VIEW: CPT

## 2019-07-09 PROCEDURE — 85730 THROMBOPLASTIN TIME PARTIAL: CPT

## 2019-07-09 PROCEDURE — 80061 LIPID PANEL: CPT

## 2019-07-09 PROCEDURE — 76705 ECHO EXAM OF ABDOMEN: CPT | Mod: 26,RT

## 2019-07-09 PROCEDURE — 80053 COMPREHEN METABOLIC PANEL: CPT

## 2019-07-09 PROCEDURE — A9585: CPT

## 2019-07-09 PROCEDURE — 85576 BLOOD PLATELET AGGREGATION: CPT

## 2019-07-09 PROCEDURE — 93005 ELECTROCARDIOGRAM TRACING: CPT

## 2019-07-09 PROCEDURE — C8929: CPT

## 2019-07-09 PROCEDURE — 76705 ECHO EXAM OF ABDOMEN: CPT

## 2019-07-09 PROCEDURE — 83721 ASSAY OF BLOOD LIPOPROTEIN: CPT

## 2019-07-09 PROCEDURE — 83036 HEMOGLOBIN GLYCOSYLATED A1C: CPT

## 2019-07-09 PROCEDURE — 81003 URINALYSIS AUTO W/O SCOPE: CPT

## 2019-07-09 PROCEDURE — 70498 CT ANGIOGRAPHY NECK: CPT

## 2019-07-09 PROCEDURE — 70496 CT ANGIOGRAPHY HEAD: CPT

## 2019-07-09 PROCEDURE — 97165 OT EVAL LOW COMPLEX 30 MIN: CPT

## 2019-07-09 PROCEDURE — G0378: CPT

## 2019-07-09 PROCEDURE — 99233 SBSQ HOSP IP/OBS HIGH 50: CPT

## 2019-07-09 PROCEDURE — 85610 PROTHROMBIN TIME: CPT

## 2019-07-09 PROCEDURE — 70553 MRI BRAIN STEM W/O & W/DYE: CPT

## 2019-07-09 PROCEDURE — 85027 COMPLETE CBC AUTOMATED: CPT

## 2019-07-09 PROCEDURE — 97161 PT EVAL LOW COMPLEX 20 MIN: CPT

## 2019-07-09 PROCEDURE — 99291 CRITICAL CARE FIRST HOUR: CPT | Mod: 25

## 2019-07-09 PROCEDURE — 70450 CT HEAD/BRAIN W/O DYE: CPT

## 2019-07-09 PROCEDURE — 70544 MR ANGIOGRAPHY HEAD W/O DYE: CPT

## 2019-07-09 PROCEDURE — 80307 DRUG TEST PRSMV CHEM ANLYZR: CPT

## 2019-07-09 RX ORDER — ASPIRIN/CALCIUM CARB/MAGNESIUM 324 MG
1 TABLET ORAL
Qty: 30 | Refills: 0
Start: 2019-07-09 | End: 2019-08-07

## 2019-07-09 RX ORDER — CLOPIDOGREL BISULFATE 75 MG/1
1 TABLET, FILM COATED ORAL
Qty: 30 | Refills: 0
Start: 2019-07-09 | End: 2019-08-07

## 2019-07-09 RX ORDER — ALBUTEROL 90 UG/1
2 AEROSOL, METERED ORAL
Qty: 0 | Refills: 0 | DISCHARGE

## 2019-07-09 RX ORDER — ATORVASTATIN CALCIUM 80 MG/1
1 TABLET, FILM COATED ORAL
Qty: 30 | Refills: 0
Start: 2019-07-09 | End: 2019-08-07

## 2019-07-09 RX ORDER — ASPIRIN/CALCIUM CARB/MAGNESIUM 324 MG
325 TABLET ORAL DAILY
Refills: 0 | Status: DISCONTINUED | OUTPATIENT
Start: 2019-07-09 | End: 2019-07-09

## 2019-07-09 RX ADMIN — CLOPIDOGREL BISULFATE 75 MILLIGRAM(S): 75 TABLET, FILM COATED ORAL at 11:15

## 2019-07-09 RX ADMIN — ENOXAPARIN SODIUM 40 MILLIGRAM(S): 100 INJECTION SUBCUTANEOUS at 06:02

## 2019-07-09 RX ADMIN — Medication 1 CAPSULE(S): at 06:02

## 2019-07-09 RX ADMIN — Medication 81 MILLIGRAM(S): at 11:15

## 2019-07-09 NOTE — OCCUPATIONAL THERAPY INITIAL EVALUATION ADULT - LIVES WITH, PROFILE
Pt lives with spouse and son in a duplex, 2 steps to enter with a handrail. 1 flight to bed and bath. +Tub with curtains. No DME/spouse/children

## 2019-07-09 NOTE — OCCUPATIONAL THERAPY INITIAL EVALUATION ADULT - PRECAUTIONS/LIMITATIONS, REHAB EVAL
Pt stated that she woke up and started talking to her cat and realized she was unable to articulate the words. When she tried to feed her cat, her R hand felt very weak and was unable to open the cat food properly. She states that she started feeling better around 9am. She had a similar episode s/p her recent embolization procedure in June 2019 when she noticed she had some slurred speech, but no weakness or other neurologic deficits. Symptoms self resolved over the next 1-2 days. She had an additional episode of L sided frontal HA with R hand numbness and weakness last Saturday that self resolved as well./fall precautions

## 2019-07-09 NOTE — OCCUPATIONAL THERAPY INITIAL EVALUATION ADULT - PERTINENT HX OF CURRENT PROBLEM, REHAB EVAL
63Y F, former smoker with lung cancer s/p RUL lobectomy with chemo, incidentally found to have 10mm L MCA aneurysm on CTA 5/2019 s/p web embolization on 6/21/19 who presented with L frontal HA, slurred speech and R hand weakness since 3:30 morning PTA.

## 2019-07-09 NOTE — PROGRESS NOTE ADULT - SUBJECTIVE AND OBJECTIVE BOX
63 year old female  with PMH   former smoker with lung cancer s/p RUL lobectomy with chemo, COPD, HTN, HCV with liver cirrhosis, 10mm Left  MCA aneurysm on CTA 5/2019 s/p web embolization on 6/21/19 who presented with L frontal HA, slurred speech and R hand weakness since 3:30 this morning. Symptoms have largely resolved. CTH and CTA H/N without acute abnormality, with stenosis of distal R MCA branch. Symptoms concerning for L MCA ischemic stroke, EP consult for ILR implant      Problem: Stroke. awaiting results of TTE with bubble study   plans for ILR cancelled per stroke team to have 30 day event monitor to assess for arrhthymias as a possible source of embolism , spoke with tech at [id number] to arrange prior to discharge

## 2019-07-09 NOTE — PROGRESS NOTE ADULT - ASSESSMENT
Bree Doss  63F pmhx HTN, COPD, Lung CA s/p RUL lobectomy (2018) and chemo, HCV c/b liver cirrhosis s/p WEB 6/21/19 with transient post op WFD and R hand weakness presents for reoccurrence of transient symptoms this morning with headache. Now back to baseline with very slight hesitation when speaking at times, on [id number].  - CTH and CTA look grossly normal with good arborization distal to WEB, read as stenosis of distal right MCA branch vasculature  -MRI shows ischemic change in the left posterior frontal and right parietal area. MRA stable, shows L MCA bifurcation aneurysm s/p embolization and narrowing of L M2.  -Loaded with plavix, cont with ASA and plavix. Bree Doss  63F pmhx HTN, COPD, Lung CA s/p RUL lobectomy (2018) and chemo, HCV c/b liver cirrhosis s/p WEB 6/21/19 with transient post op WFD and R hand weakness presents for reoccurrence of transient symptoms this morning with headache. Now back to baseline with very slight hesitation when speaking at times, on [id number].  - CTH and CTA look grossly normal with good arborization distal to WEB, read as stenosis of distal right MCA branch vasculature  -MRI shows ischemic change in the left posterior frontal and right parietal area. MRA stable, shows L MCA bifurcation aneurysm s/p embolization and narrowing of L M2.  -Loaded with plavix, cont with ASA and plavix.   - Would recommend P2Y12 test today

## 2019-07-09 NOTE — OCCUPATIONAL THERAPY INITIAL EVALUATION ADULT - ADDITIONAL COMMENTS
CT Head 7/7- Status post left MCA bifurcation aneurysm embolization with redemonstration of areas of slight decreased attenuation along the left posterior frontal lobe involving the pre and postcentral gyrus, in the region of previously demonstrated restricted diffusion on MRI of 6/22/2019, small foci of developing ischemia not excluded, repeat MRI may be obtained for improved assessment as clinically warranted. There is no new intracranial hemorrhage, extra-axial collection, midline shift, central herniation, hydrocephalus or transcortical infarct. CT angiography neck: No  hemodynamically significant stenosis by NASCET criteria. No evidence of vascular dissection. CT angiography brain: Status post left MCA bifurcation aneurysm embolization with a  stenosis of the distal right MCA branch vasculature. Dolichoectatic intracranial circulation may reflect hypertension. Bulbous distal right M1 and junction of right M2, suggest CT follow-up to ensure stability.

## 2019-07-09 NOTE — SPEECH LANGUAGE PATHOLOGY EVALUATION - SLP PERTINENT HISTORY OF CURRENT PROBLEM
63Y R handed  female with PMH: HTN (on ASA 325mg), COPD not on home O2, former smoker with lung cancer s/p RUL lobectomy with chemo, HCV with liver cirrhosis, incidentally found to have 10mm L MCA aneurysm on CTA 5/2019 s/p web embolization on 6/21/19 who presented to ED 7/7/19 with L frontal HA, slurred speech and R hand weakness since 3:30 morning PTA. Patient stated that she woke up and started talking to her cat and realized she was unable to articulate the words. When she tried to feed her cat, her R hand felt very weak and was unable to open the cat food properly. She states that she started feeling better around 9am. She had a similar episode s/p her recent embolization procedure in June 2019 when she noticed she had some slurred speech, but no weakness or other neurologic deficits. Symptoms self resolved over the next 1-2 days. Stroke code not called when patient arrived as outside of tPA window. (continued)

## 2019-07-09 NOTE — PROGRESS NOTE ADULT - ASSESSMENT
ASSESSMENT: 63 years old woman with multiple vascular risk factors including age, HTN and COPD and a history of 10 mm left MCA aneurysm s/p embolization on 6/21/19 is evaluated at [facility] for an acute onset of right hand clumsiness/weakness and slurring of speech noted upon waking up in the morning of 7/7 at around 3:30 AM. CT brain on admission did not show any evidence of an acute infarct or hemorrhage. CTA head and neck showed artifact from WEB placement in the left MCA bifurcation aneurysm but was otherwise grossly unremarkable. MRI brain showed an acute/subacute embolic-looking infarcts in the left MCA distribution.     Impression:  Cerebral embolism with cerebral infarction. Left MCA distribution stroke -  likely mechanism being periprocedural stroke probably related to micro-thrombi being formed over the surface of WEB with distal embolization in the left MCA distribution versus an embolic stroke from undetermined source, favoring the former     NEURO: neurologically without acute change, continue close monitoring for neurologic deterioration, permissive HTN followed by gradual normotension, titrate statin to LDL goal less than 70, MR imaging as noted. Physical therapy/OT eval.     ANTITHROMBOTIC THERAPY:  Aspirin and clopidogrel 75mg  (load with 600 mg due to concerns for periprocedural stroke with concerns for thrombi formation over the surface of WEB - as recommended by Dr. Moreira/neuro interventionist) for at least 6 months followed by aspirin     PULMONARY: CXR clear, protecting airway, saturating well , she has a hx of lung ca s/p resection, she follows closely with oncology and states negative PET scan recently.    CARDIOVASCULAR: TTE with bubble study and continue with telemetry to screen for possible cardiac source of embolism  - Prolonged cardiac monitoring with ICM to screen for occult cardiac arrhythmia like atrial fibrillation being the cause of possible cardiac source of embolism to be considered based on results of above mentioned cardiac tests. Considering recurrent embolic-looking stroke and potential implications for choosing measures of secondary stroke prevention in this patient if diagnosed to have atrial fibrillation, benefits of undergoing prolonged cardiac monitoring with ICM placement are thought to outweigh potential risks, which were discussed with patient and her  at bedside in detail                              SBP goal: gradual normotension     GASTROINTESTINAL:  dysphagia screen passed, tolerating diet      Diet: regular     RENAL: BUN/Cr within range, maintain adequate hydration, good urine output      Na Goal: Greater than 135     Cordero: n     HEMATOLOGY: H/H without anemia, close outpatient heme/onc follow up, Platelets 294, LE duplex pending given hx of malignancy      DVT ppx: Heparin s.c [] LMWH [x]     ID: afebrile,  leukocytosis resolved     OTHER:      DISPOSITION: pending PT/OT eval      CORE MEASURES:        Admission NIHSS:      TPA: [] YES [x] NO      LDL/HDL: -/37     Depression Screen: p     Statin Therapy: p     Dysphagia Screen: [x] PASS [] FAIL     Smoking [x] YES [] NO former per patient quit >1 year ago     Afib [] YES [x] NO     Stroke Education [x] YES [] NO    Obtain screening lower extremity venous ultrasound in patients who meet 1 or more of the following criteria as patient is high risk for DVT/PE on admission:   [] History of DVT/PE  [x]Hypercoagulable states (Factor V Leiden, Cancer, OCP, etc. )  []Prolonged immobility (hemiplegia/hemiparesis/post operative or any other extended immobilization)  [] Transferred from outside facility (Rehab or Long term care)  [] Age </= to 50 ASSESSMENT: 63 years old woman with multiple vascular risk factors including age, HTN and COPD and a history of 10 mm left MCA aneurysm s/p embolization on 6/21/19 is evaluated at [facility] for an acute onset of right hand clumsiness/weakness and slurring of speech noted upon waking up in the morning of 7/7 at around 3:30 AM. CT brain on admission did not show any evidence of an acute infarct or hemorrhage. CTA head and neck showed artifact from WEB placement in the left MCA bifurcation aneurysm but was otherwise grossly unremarkable. MRI brain showed an acute/subacute embolic-looking infarcts in the left MCA distribution.     Impression:  Cerebral embolism with cerebral infarction. Left MCA distribution stroke -  likely mechanism being periprocedural stroke probably related to micro-thrombi being formed over the surface of WEB with distal embolization in the left MCA distribution versus an embolic stroke from undetermined source, favoring the former     NEURO: neurologically without acute change, continue close monitoring for neurologic deterioration, permissive HTN followed by gradual normotension, off statin therapy at this time- she has a history of liver cirrhosis which needs further evaluation prior to initiation of lipid management, MR imaging as noted. Physical therapy/OT eval for home with outpatient therapy.     ANTITHROMBOTIC THERAPY:  Aspirin and clopidogrel 75mg  (load with 600 mg due to concerns for periprocedural stroke with concerns for thrombi formation over the surface of WEB - as recommended by Dr. Moreira/neuro interventionist) for at least 6 months followed by aspirin     PULMONARY: CXR clear, protecting airway, saturating well , she has a hx of lung ca s/p resection, she follows closely with oncology and states negative PET scan recently.    CARDIOVASCULAR: TTE with bubble study and continue with telemetry to screen for possible cardiac source of embolism  - Prolonged cardiac monitoring with event monitoring (at least 30 days) to screen for occult cardiac arrhythmia like atrial fibrillation being the cause of possible cardiac source of embolism to be considered based on results of above mentioned cardiac tests. Considering recurrent embolic-looking stroke and potential implications for choosing measures of secondary stroke prevention in this patient if diagnosed to have atrial fibrillation, benefits of undergoing prolonged cardiac monitoring would likely be beneficial.      SBP goal: gradual normotension     GASTROINTESTINAL:  dysphagia screen passed, tolerating diet, US abdomen to evaluate degree of cirrhosis for consideration in starting a statin.      Diet: regular     RENAL:  maintain adequate hydration, good urine output      Na Goal: Greater than 135     Cordero: n     HEMATOLOGY: H/H without anemia, close outpatient heme/onc follow up, Platelets 294, LE duplex pending given hx of malignancy      DVT ppx: Heparin s.c [] LMWH [x]     ID: afebrile,  leukocytosis resolved     OTHER:  current condition and plan of care d/w patient at bedside in extent. Close outpatient PCP follow up encouraged as well. She will see Dr. Moreira at follow up as well.     DISPOSITION: Home with outpatient therapy.       CORE MEASURES:        Admission NIHSS:      TPA: [] YES [x] NO      LDL/HDL: -/37     Depression Screen: 0     Statin Therapy: p- as noted      Dysphagia Screen: [x] PASS [] FAIL     Smoking [x] YES [] NO former per patient quit >10 years ago     Afib [] YES [x] NO     Stroke Education [x] YES [] NO    Obtain screening lower extremity venous ultrasound in patients who meet 1 or more of the following criteria as patient is high risk for DVT/PE on admission:   [] History of DVT/PE  [x]Hypercoagulable states (Factor V Leiden, Cancer, OCP, etc. )  []Prolonged immobility (hemiplegia/hemiparesis/post operative or any other extended immobilization)  [] Transferred from outside facility (Rehab or Long term care)  [] Age </= to 50 ASSESSMENT: 63 years old woman with multiple vascular risk factors including age, HTN and COPD and a history of 10 mm left MCA aneurysm s/p embolization on 6/21/19 is evaluated at [facility] for an acute onset of right hand clumsiness/weakness and slurring of speech noted upon waking up in the morning of 7/7 at around 3:30 AM. CT brain on admission did not show any evidence of an acute infarct or hemorrhage. CTA head and neck showed artifact from WEB placement in the left MCA bifurcation aneurysm but was otherwise grossly unremarkable. MRI brain showed an acute/subacute embolic-looking infarcts in the left MCA distribution.     Impression:  Cerebral embolism with cerebral infarction. Left MCA distribution stroke -  likely mechanism being periprocedural stroke probably related to micro-thrombi being formed over the surface of WEB with distal embolization in the left MCA distribution versus an embolic stroke from undetermined source, favoring the former     NEURO: neurologically without acute change, continue close monitoring for neurologic deterioration, permissive HTN followed by gradual normotension, off statin therapy at this time- she has a history of liver cirrhosis which needs further evaluation prior to initiation of lipid management, MR imaging as noted. Physical therapy/OT eval for home with outpatient therapy.     ANTITHROMBOTIC THERAPY:  Aspirin and clopidogrel 75mg  (load with 600 mg due to concerns for periprocedural stroke with concerns for thrombi formation over the surface of WEB - as recommended by Dr. Moreira/neuro interventionist) for at least 6 months followed by aspirin     PULMONARY: CXR clear, protecting airway, saturating well , she has a hx of lung ca s/p resection, she follows closely with oncology and states negative PET scan recently.    CARDIOVASCULAR: TTE with bubble study and continue with telemetry to screen for possible cardiac source of embolism  - Prolonged cardiac monitoring with event monitoring (at least 30 days) to screen for occult cardiac arrhythmia like atrial fibrillation being the cause of possible cardiac source of embolism to be considered based on results of above mentioned cardiac tests. Considering recurrent embolic-looking stroke and potential implications for choosing measures of secondary stroke prevention in this patient if diagnosed to have atrial fibrillation, benefits of undergoing prolonged cardiac monitoring would likely be beneficial.      SBP goal: gradual normotension     GASTROINTESTINAL:  dysphagia screen passed, tolerating diet, US abdomen to evaluate degree of cirrhosis for consideration in starting a statin.      Diet: regular     RENAL:  maintain adequate hydration, good urine output      Na Goal: Greater than 135     Cordero: n     HEMATOLOGY: H/H without anemia, close outpatient heme/onc follow up, Platelets 294, LE duplex pending given hx of malignancy      DVT ppx: Heparin s.c [] LMWH [x]     ID: afebrile,  leukocytosis resolved     OTHER:  current condition and plan of care d/w patient at bedside in extent. Close outpatient PCP follow up encouraged as well. She will see Dr. Moreira at follow up as well.  Requested nutrition consult prior to discharge. Discussed healthy diet and lifestyle modifications.     DISPOSITION: Home with outpatient therapy.       CORE MEASURES:        Admission NIHSS:      TPA: [] YES [x] NO      LDL/HDL: -/37     Depression Screen: 0     Statin Therapy: p- as noted      Dysphagia Screen: [x] PASS [] FAIL     Smoking [x] YES [] NO former per patient quit >10 years ago     Afib [] YES [x] NO     Stroke Education [x] YES [] NO    Obtain screening lower extremity venous ultrasound in patients who meet 1 or more of the following criteria as patient is high risk for DVT/PE on admission:   [] History of DVT/PE  [x]Hypercoagulable states (Factor V Leiden, Cancer, OCP, etc. )  []Prolonged immobility (hemiplegia/hemiparesis/post operative or any other extended immobilization)  [] Transferred from outside facility (Rehab or Long term care)  [] Age </= to 50 63 years old woman with multiple vascular risk factors including age, HTN and COPD and a history of 10 mm left MCA aneurysm s/p embolization on 6/21/19 is evaluated at [facility] for an acute onset of right hand clumsiness/weakness and slurring of speech noted upon waking up in the morning of 7/7 at around 3:30 AM. CT brain on admission did not show any evidence of an acute infarct or hemorrhage. CTA head and neck showed artifact from WEB placement in the left MCA bifurcation aneurysm but was otherwise grossly unremarkable. MRI brain showed an acute/subacute embolic-looking infarcts in the left MCA distribution. TTE did not show any obvious structural cardiac source of embolism.    Impression:  Cerebral embolism with cerebral infarction. Left MCA distribution stroke -  likely mechanism being periprocedural stroke probably related to micro-thrombi being formed over the surface of WEB with distal embolization in the left MCA distribution versus an embolic stroke from undetermined source, favoring the former     NEURO: neurologically without acute change, continue close monitoring for neurologic deterioration, permissive HTN followed by gradual normotension, Off statin therapy at this time - she has a history of liver cirrhosis which needs further evaluation prior to initiation of lipid management, advised to follow him closely With her PCP/cardiologist regarding optimal LDL management including consideration of PCSK-9 inhibitors for LDL management if not a candidate for statin therapy in the setting of cirrhosis of liver, MR imaging as noted. Physical therapy/OT eval for home with outpatient therapy.     ANTITHROMBOTIC THERAPY:  Aspirin and clopidogrel 75mg (loaded with 600 mg due to concerns for periprocedural stroke with concerns for thrombi formation over the surface of WEB - as recommended by Dr. Moreira/neuro interventionist) for at least 6 months followed by aspirin     PULMONARY: CXR clear, protecting airway, saturating well , she has a hx of lung ca s/p resection, she follows closely with oncology and states negative PET scan recently.    CARDIOVASCULAR: TTE as above and continue with telemetry to screen for possible cardiac source of embolism. prolonged cardiac monitoring with 30 days event monitor to screen for occult cardiac arrhythmias with atrial fibrillation being the cardiac source of embolism be performed as outpatient. Further prolonged cardiac monitoring with ICM placement to be considered based on results of event monitor.     SBP goal: gradual normotension     GASTROINTESTINAL:  dysphagia screen passed, tolerating diet, US abdomen to evaluate degree of cirrhosis for consideration in starting a statin.      Diet: regular     RENAL:  maintain adequate hydration, good urine output      Na Goal: Greater than 135     Codrero: n     HEMATOLOGY: H/H without anemia, close outpatient heme/onc follow up, Platelets 294, LE duplex pending given hx of malignancy      DVT ppx: Heparin s.c [] LMWH [x]     ID: afebrile,  leukocytosis resolved     OTHER:  current condition and plan of care d/w patient at bedside in extent. Close outpatient PCP follow up encouraged as well. She will see Dr. Moreira at follow up as well.  Requested nutrition consult prior to discharge. Discussed healthy diet and lifestyle modifications.     DISPOSITION: Home with outpatient therapy.       CORE MEASURES:        Admission NIHSS:      TPA: [] YES [x] NO      LDL/HDL: -/37     Depression Screen: 0     Statin Therapy: p- as noted      Dysphagia Screen: [x] PASS [] FAIL     Smoking [x] YES [] NO former per patient quit >10 years ago     Afib [] YES [x] NO     Stroke Education [x] YES [] NO    Obtain screening lower extremity venous ultrasound in patients who meet 1 or more of the following criteria as patient is high risk for DVT/PE on admission:   [] History of DVT/PE  [x]Hypercoagulable states (Factor V Leiden, Cancer, OCP, etc. )  []Prolonged immobility (hemiplegia/hemiparesis/post operative or any other extended immobilization)  [] Transferred from outside facility (Rehab or Long term care)  [] Age </= to 50

## 2019-07-09 NOTE — DISCHARGE NOTE NURSING/CASE MANAGEMENT/SOCIAL WORK - [id number]_GEN_ALL_CORE
Need for follow up after discharge/Risk factors for stroke/Stroke education booklet/Call 911 for stroke/Prescribed medications/Stroke warning signs and symptoms/Signs and symptoms of stroke/Stroke support groups for patients, families, and friends

## 2019-07-09 NOTE — PROGRESS NOTE ADULT - SUBJECTIVE AND OBJECTIVE BOX
THE PATIENT WAS SEEN AND EXAMINED BY ME WITH THE HOUSESTAFF AND STROKE TEAM DURING MORNING ROUNDS.   HPI:  63Y R handed  female with PMH HTN (on ASA 325mg), COPD not on home O2, former smoker with lung cancer s/p RUL lobectomy with chemo, HCV with liver cirrhosis, incidentally found to have 10mm L MCA aneurysm on CTA 5/2019 s/p web embolization on 6/21/19 who presented with L frontal HA, slurred speech and R hand weakness since 3:30 morning prior to arrival. Patient stated that she woke up and started talking to her cat and realized she was unable to articulate the words. When she tried to feed her cat, her R hand felt very weak and was unable to open the cat food properly. She stated that she started feeling better around 9am. She had a similar episode s/p her recent embolization procedure in June 2019 when she noticed she had some slurred speech, but no weakness or other neurologic deficits. Symptoms self resolved over the next 1-2 days. She had an additional episode of L sided frontal HA with R hand numbness and weakness last Saturday prior to admisison that self resolved as well. Stroke code not called when patient arrived as outside of tPA window.     SUBJECTIVE: No events overnight.  No new neurologic complaints.      aspirin  chewable 81 milliGRAM(s) Oral daily  clopidogrel Tablet 75 milliGRAM(s) Oral daily  enoxaparin Injectable 40 milliGRAM(s) SubCutaneous every 24 hours  triamterene 37.5 mG/hydrochlorothiazide 25 mG Capsule 1 Capsule(s) Oral daily      PHYSICAL EXAM:   Vital Signs Last 24 Hrs  T(C): 36.5 (09 Jul 2019 05:00), Max: 37.3 (08 Jul 2019 08:18)  T(F): 97.7 (09 Jul 2019 05:00), Max: 99.1 (08 Jul 2019 08:18)  HR: 86 (09 Jul 2019 05:00) (71 - 108)  BP: 126/79 (09 Jul 2019 05:00) (108/65 - 171/91)  BP(mean): --  RR: 18 (09 Jul 2019 05:00) (17 - 18)  SpO2: 96% (09 Jul 2019 05:00) (95% - 98%)    General: No acute distress  HEENT: EOM intact, visual fields full  Abdomen: Soft, nontender, nondistended   Extremities: No edema    NEUROLOGICAL EXAM:  Mental status: Awake, alert, oriented x3, no aphasia, no neglect, normal memory   Cranial Nerves: No facial asymmetry, no nystagmus,  dysarthria,  tongue midline  Motor exam: Normal tone, no drift, 5/5 RUE, 5/5 RLE, 5/5 LUE, 5/5 LLE, normal fine finger movements.  Sensation: Intact to light touch   Coordination/ Gait: right hand clumsiness     LABS:                        15.3   9.6   )-----------( 294      ( 07 Jul 2019 09:58 )             45.2    07-07    138  |  102  |  16  ----------------------------<  140<H>  4.2   |  20<L>  |  0.69    Ca    10.4      07 Jul 2019 09:58    TPro  7.7  /  Alb  4.8  /  TBili  0.3  /  DBili  x   /  AST  21  /  ALT  24  /  AlkPhos  112  07-07  PT/INR - ( 07 Jul 2019 09:58 )   PT: 10.6 sec;   INR: 0.93 ratio         PTT - ( 07 Jul 2019 09:58 )  PTT:29.3 sec  Hemoglobin A1C, Whole Blood: 5.8 % (07-08 @ 07:45)      IMAGING: Reviewed by me.     07.07.19  CT brain: Status post left MCA bifurcation aneurysm embolization with   redemonstration of areas of slight decreased attenuation along the left   posterior frontal lobe involving the pre and postcentral gyrus, in the   region of previously demonstrated restricted diffusion on MRI of   6/22/2019, small foci of developing ischemia not excluded, repeat MRI may   be obtained for improved assessment as clinically warranted.. There is no   new intracranial hemorrhage, extra-axial collection, midline shift,   central herniation, hydrocephalus or transcortical infarct.     CT angiography neck: No  hemodynamically significant stenosis by NASCET   criteria. No evidence of vascular dissection.    CT angiography brain: Status post left MCA bifurcation aneurysm   embolization with a  stenosis of the distal right MCA branch vasculature.   Dolichoectatic intracranial circulation may reflect hypertension. Bulbous   distalright M1 and junction of right M2, suggest CT follow-up to ensure   stability.    MRI/ MRA Head w/wo IV Cont (07.07.19)   Restricted diffusion with hyperintense DWI, T2, and FLAIR signal in the   left posterior frontal and right parietal pre and postcentral gyrus with   edema and mass effect consistent with evolving ischemic change without   hemorrhagic transformation.    Ventricles and sulci are unchanged, demonstration of microvascular   disease and remote lacunar infarcts. No new large hemorrhage or midline   shift. Basal cisterns remain patent.    Redemonstration unchanged left MCA bifurcation aneurysm embolization   causing susceptibly artifact, there are more pronounced stenosis of the   left M2 MCA branches, and decreased size and number of distal left M3 MCA   branch vasculature.    Unchanged bulbous right M1 M2 junction, suggest follow up to ensure   stability, tortuous intracranial circulation likely reflecting   hypertension. THE PATIENT WAS SEEN AND EXAMINED BY ME WITH THE HOUSESTAFF AND STROKE TEAM DURING MORNING ROUNDS.   HPI:  63Y R handed  female with PMH HTN (on ASA 325mg), COPD not on home O2, former smoker with lung cancer s/p RUL lobectomy with chemo, HCV with liver cirrhosis, incidentally found to have 10mm L MCA aneurysm on CTA 5/2019 s/p web embolization on 6/21/19 who presented with L frontal HA, slurred speech and R hand weakness since 3:30 morning prior to arrival. Patient stated that she woke up and started talking to her cat and realized she was unable to articulate the words. When she tried to feed her cat, her R hand felt very weak and was unable to open the cat food properly. She stated that she started feeling better around 9am. She had a similar episode s/p her recent embolization procedure in June 2019 when she noticed she had some slurred speech, but no weakness or other neurologic deficits. Symptoms self resolved over the next 1-2 days. She had an additional episode of L sided frontal HA with R hand numbness and weakness last Saturday prior to admisison that self resolved as well. Stroke code not called when patient arrived as outside of tPA window.     SUBJECTIVE: No events overnight.  No new neurologic complaints.      aspirin  chewable 81 milliGRAM(s) Oral daily  clopidogrel Tablet 75 milliGRAM(s) Oral daily  enoxaparin Injectable 40 milliGRAM(s) SubCutaneous every 24 hours  triamterene 37.5 mG/hydrochlorothiazide 25 mG Capsule 1 Capsule(s) Oral daily      PHYSICAL EXAM:   Vital Signs Last 24 Hrs  T(C): 36.5 (09 Jul 2019 05:00), Max: 37.3 (08 Jul 2019 08:18)  T(F): 97.7 (09 Jul 2019 05:00), Max: 99.1 (08 Jul 2019 08:18)  HR: 86 (09 Jul 2019 05:00) (71 - 108)  BP: 126/79 (09 Jul 2019 05:00) (108/65 - 171/91)  BP(mean): --  RR: 18 (09 Jul 2019 05:00) (17 - 18)  SpO2: 96% (09 Jul 2019 05:00) (95% - 98%)    General: No acute distress  HEENT: EOM intact, visual fields full  Abdomen: Soft, nontender, nondistended   Extremities: No edema    NEUROLOGICAL EXAM:  Mental status: Awake, alert, oriented x3, no aphasia, no neglect, normal memory   Cranial Nerves: No facial asymmetry, no nystagmus,  dysarthria,  tongue midline  Motor exam: Normal tone, subtle right hand pronator drift, 5/5 RUE, 5/5 RLE, 5/5 LUE, 5/5 LLE, decreased fine finger movements on right   Sensation: Intact to light touch   Coordination/ Gait: right hand clumsiness     LABS:                        15.3   9.6   )-----------( 294      ( 07 Jul 2019 09:58 )             45.2    07-07    138  |  102  |  16  ----------------------------<  140<H>  4.2   |  20<L>  |  0.69    Ca    10.4      07 Jul 2019 09:58    TPro  7.7  /  Alb  4.8  /  TBili  0.3  /  DBili  x   /  AST  21  /  ALT  24  /  AlkPhos  112  07-07  PT/INR - ( 07 Jul 2019 09:58 )   PT: 10.6 sec;   INR: 0.93 ratio         PTT - ( 07 Jul 2019 09:58 )  PTT:29.3 sec  Hemoglobin A1C, Whole Blood: 5.8 % (07-08 @ 07:45)      IMAGING: Reviewed by me.     07.07.19  CT brain: Status post left MCA bifurcation aneurysm embolization with   redemonstration of areas of slight decreased attenuation along the left   posterior frontal lobe involving the pre and postcentral gyrus, in the   region of previously demonstrated restricted diffusion on MRI of   6/22/2019, small foci of developing ischemia not excluded, repeat MRI may   be obtained for improved assessment as clinically warranted.. There is no   new intracranial hemorrhage, extra-axial collection, midline shift,   central herniation, hydrocephalus or transcortical infarct.     CT angiography neck: No  hemodynamically significant stenosis by NASCET   criteria. No evidence of vascular dissection.    CT angiography brain: Status post left MCA bifurcation aneurysm   embolization with a  stenosis of the distal right MCA branch vasculature.   Dolichoectatic intracranial circulation may reflect hypertension. Bulbous   distalright M1 and junction of right M2, suggest CT follow-up to ensure   stability.    MRI/ MRA Head w/wo IV Cont (07.07.19)   Restricted diffusion with hyperintense DWI, T2, and FLAIR signal in the   left posterior frontal and right parietal pre and postcentral gyrus with   edema and mass effect consistent with evolving ischemic change without   hemorrhagic transformation.    Ventricles and sulci are unchanged, demonstration of microvascular   disease and remote lacunar infarcts. No new large hemorrhage or midline   shift. Basal cisterns remain patent.    Redemonstration unchanged left MCA bifurcation aneurysm embolization   causing susceptibly artifact, there are more pronounced stenosis of the   left M2 MCA branches, and decreased size and number of distal left M3 MCA   branch vasculature.    Unchanged bulbous right M1 M2 junction, suggest follow up to ensure   stability, tortuous intracranial circulation likely reflecting   hypertension. THE PATIENT WAS SEEN AND EXAMINED BY ME WITH THE HOUSESTAFF AND STROKE TEAM DURING MORNING ROUNDS.     HPI:  63Y R handed  female with PMH HTN (on ASA 325mg), COPD not on home O2, former smoker with lung cancer s/p RUL lobectomy with chemo, HCV with liver cirrhosis, incidentally found to have 10mm L MCA aneurysm on CTA 5/2019 s/p web embolization on 6/21/19 who presented with L frontal HA, slurred speech and R hand weakness since 3:30 morning prior to arrival. Patient stated that she woke up and started talking to her cat and realized she was unable to articulate the words. When she tried to feed her cat, her R hand felt very weak and was unable to open the cat food properly. She stated that she started feeling better around 9am. She had a similar episode s/p her recent embolization procedure in June 2019 when she noticed she had some slurred speech, but no weakness or other neurologic deficits. Symptoms self resolved over the next 1-2 days. She had an additional episode of L sided frontal HA with R hand numbness and weakness last Saturday prior to admisison that self resolved as well. Stroke code not called when patient arrived as outside of tPA window.     SUBJECTIVE: No events overnight.  No new neurologic complaints.      ROS: All negative except documented above.    aspirin  chewable 81 milliGRAM(s) Oral daily  clopidogrel Tablet 75 milliGRAM(s) Oral daily  enoxaparin Injectable 40 milliGRAM(s) SubCutaneous every 24 hours  triamterene 37.5 mG/hydrochlorothiazide 25 mG Capsule 1 Capsule(s) Oral daily      PHYSICAL EXAM:   Vital Signs Last 24 Hrs  T(C): 36.5 (09 Jul 2019 05:00), Max: 37.3 (08 Jul 2019 08:18)  T(F): 97.7 (09 Jul 2019 05:00), Max: 99.1 (08 Jul 2019 08:18)  HR: 86 (09 Jul 2019 05:00) (71 - 108)  BP: 126/79 (09 Jul 2019 05:00) (108/65 - 171/91)  BP(mean): --  RR: 18 (09 Jul 2019 05:00) (17 - 18)  SpO2: 96% (09 Jul 2019 05:00) (95% - 98%)    General: No acute distress  HEENT: EOM intact, visual fields full  Abdomen: Soft, nontender, nondistended   Extremities: No edema    NEUROLOGICAL EXAM:  Mental status: Awake, alert, oriented x3, no aphasia, no neglect, normal memory   Cranial Nerves: No facial asymmetry, no nystagmus,  dysarthria,  tongue midline  Motor exam: Normal tone, subtle right hand pronator drift, 5/5 RUE, 5/5 RLE, 5/5 LUE, 5/5 LLE, decreased fine finger movements on right   Sensation: Intact to light touch   Coordination/ Gait: right hand clumsiness     LABS:                        15.3   9.6   )-----------( 294      ( 07 Jul 2019 09:58 )             45.2    07-07    138  |  102  |  16  ----------------------------<  140<H>  4.2   |  20<L>  |  0.69    Ca    10.4      07 Jul 2019 09:58    TPro  7.7  /  Alb  4.8  /  TBili  0.3  /  DBili  x   /  AST  21  /  ALT  24  /  AlkPhos  112  07-07  PT/INR - ( 07 Jul 2019 09:58 )   PT: 10.6 sec;   INR: 0.93 ratio         PTT - ( 07 Jul 2019 09:58 )  PTT:29.3 sec  Hemoglobin A1C, Whole Blood: 5.8 % (07-08 @ 07:45)      IMAGING: Reviewed by me.     07.07.19  CT brain: Status post left MCA bifurcation aneurysm embolization with   redemonstration of areas of slight decreased attenuation along the left   posterior frontal lobe involving the pre and postcentral gyrus, in the   region of previously demonstrated restricted diffusion on MRI of   6/22/2019, small foci of developing ischemia not excluded, repeat MRI may   be obtained for improved assessment as clinically warranted.. There is no   new intracranial hemorrhage, extra-axial collection, midline shift,   central herniation, hydrocephalus or transcortical infarct.     CT angiography neck: No  hemodynamically significant stenosis by NASCET   criteria. No evidence of vascular dissection.    CT angiography brain: Status post left MCA bifurcation aneurysm   embolization with a  stenosis of the distal right MCA branch vasculature.   Dolichoectatic intracranial circulation may reflect hypertension. Bulbous   distalright M1 and junction of right M2, suggest CT follow-up to ensure   stability.    MRI/ MRA Head w/wo IV Cont (07.07.19)   Restricted diffusion with hyperintense DWI, T2, and FLAIR signal in the   left posterior frontal and right parietal pre and postcentral gyrus with   edema and mass effect consistent with evolving ischemic change without   hemorrhagic transformation.    Ventricles and sulci are unchanged, demonstration of microvascular   disease and remote lacunar infarcts. No new large hemorrhage or midline   shift. Basal cisterns remain patent.    Redemonstration unchanged left MCA bifurcation aneurysm embolization   causing susceptibly artifact, there are more pronounced stenosis of the   left M2 MCA branches, and decreased size and number of distal left M3 MCA   branch vasculature.    Unchanged bulbous right M1 M2 junction, suggest follow up to ensure   stability, tortuous intracranial circulation likely reflecting   hypertension.

## 2019-07-09 NOTE — SPEECH LANGUAGE PATHOLOGY EVALUATION - COMMENTS
(continued)    Neurosurgery consulted 7/7/19 recommending overnight observation and MRI/MRA upon admission. See below for MRI/MRA impressions. Neurology consulted 7/7/19 and following. Electophysiology consulted 7/8/19 for ILR implant planned to be placed 7/9/19. Cardiology consulted 7/8/19 recommending echo.   XRay Chest (7/7/19): lungs appear to be clear  MRI/MRA Impressions (7/7/19): Restricted diffusion with hyperintense DWI, T2, and FLAIR signal in the left posterior frontal and right parietal pre and stcentral gyrus with edema and mass effect consistent with evolving ischemic change without hemorrhagic transformation. Ventricles and sulci are unchanged, demonstration of microvascular disease and remote lacunar infarcts. No new large hemorrhage or midline shift. Basal cisterns remain patent. Redemonstration unchanged left MCA bifurcation aneurysm embolization causing susceptibly artifact, there are more pronounced stenosis of   left M2 MCA branches, and decreased size and number of distal left M3 MCA branch vasculature. Unchanged bulbous right M1 M2 junction, suggest follow up to ensure stability, tortuous intracranial circulation likely reflecting hypertension.     CT Brain Impressions (7/7/19): Status post left MCA bifurcation aneurysm embolization with redemonstration of areas of slight decreased attenuation along the left posterior frontal lobe involving the pre and postcentral gyrus, in the region of previously demonstrated restricted diffusion on MRI of 6/22/2019, small foci of developing ischemia not excluded, repeat MRI may be obtained for improved assessment as clinically warranted.. There is no   new intracranial hemorrhage, extra-axial collection, midline shift, central herniation, hydrocephalus or transcortical infarct.

## 2019-07-09 NOTE — DISCHARGE NOTE NURSING/CASE MANAGEMENT/SOCIAL WORK - NSDCDPATPORTLINK_GEN_ALL_CORE
You can access the [facility] Patient Portal, offered by [facility], by registering with the following website: [email or web address]

## 2019-07-09 NOTE — PROVIDER CONTACT NOTE (OTHER) - ASSESSMENT
Pt ambulating to bathroom during episode of tachycardia and returned to baseline without intervention. No signs/symptoms of distress. Pt resting comfortably in bed with normal sinus rhythm at this time.

## 2019-07-09 NOTE — SPEECH LANGUAGE PATHOLOGY EVALUATION - SLP DIAGNOSIS
Chart reviewed. Speech-language evaluation attempted. Discussed with RAHUL Portillo and RUSTY Glover. Pt currently off floor. This service will f/u as schedule permits

## 2019-07-09 NOTE — OCCUPATIONAL THERAPY INITIAL EVALUATION ADULT - DIAGNOSIS, OT EVAL
Patient with deficits in ADL status and functional mobility due to impaired balance, strength, ROM, coordination Patient with deficits in ADL/IADL status strength and coordination

## 2019-07-10 PROCEDURE — 97166 OT EVAL MOD COMPLEX 45 MIN: CPT

## 2019-07-10 PROCEDURE — C1894: CPT

## 2019-07-10 PROCEDURE — 84295 ASSAY OF SERUM SODIUM: CPT

## 2019-07-10 PROCEDURE — 85027 COMPLETE CBC AUTOMATED: CPT

## 2019-07-10 PROCEDURE — 70544 MR ANGIOGRAPHY HEAD W/O DYE: CPT

## 2019-07-10 PROCEDURE — 82330 ASSAY OF CALCIUM: CPT

## 2019-07-10 PROCEDURE — 82947 ASSAY GLUCOSE BLOOD QUANT: CPT

## 2019-07-10 PROCEDURE — C1760: CPT

## 2019-07-10 PROCEDURE — 80048 BASIC METABOLIC PNL TOTAL CA: CPT

## 2019-07-10 PROCEDURE — 85014 HEMATOCRIT: CPT

## 2019-07-10 PROCEDURE — 76380 CAT SCAN FOLLOW-UP STUDY: CPT

## 2019-07-10 PROCEDURE — 83605 ASSAY OF LACTIC ACID: CPT

## 2019-07-10 PROCEDURE — 75894 X-RAYS TRANSCATH THERAPY: CPT

## 2019-07-10 PROCEDURE — 84132 ASSAY OF SERUM POTASSIUM: CPT

## 2019-07-10 PROCEDURE — C1887: CPT

## 2019-07-10 PROCEDURE — 82803 BLOOD GASES ANY COMBINATION: CPT

## 2019-07-10 PROCEDURE — 85610 PROTHROMBIN TIME: CPT

## 2019-07-10 PROCEDURE — 75898 FOLLOW-UP ANGIOGRAPHY: CPT

## 2019-07-10 PROCEDURE — 83735 ASSAY OF MAGNESIUM: CPT

## 2019-07-10 PROCEDURE — 85730 THROMBOPLASTIN TIME PARTIAL: CPT

## 2019-07-10 PROCEDURE — 36224 PLACE CATH CAROTD ART: CPT

## 2019-07-10 PROCEDURE — 85576 BLOOD PLATELET AGGREGATION: CPT

## 2019-07-10 PROCEDURE — 93970 EXTREMITY STUDY: CPT

## 2019-07-10 PROCEDURE — 84100 ASSAY OF PHOSPHORUS: CPT

## 2019-07-10 PROCEDURE — 97161 PT EVAL LOW COMPLEX 20 MIN: CPT

## 2019-07-10 PROCEDURE — 61624 TCAT PERM OCCLS/EMBOLJ CNS: CPT

## 2019-07-10 PROCEDURE — 82435 ASSAY OF BLOOD CHLORIDE: CPT

## 2019-07-10 PROCEDURE — 36228 PLACE CATH INTRACRANIAL ART: CPT

## 2019-07-10 PROCEDURE — 70551 MRI BRAIN STEM W/O DYE: CPT

## 2019-07-10 PROCEDURE — 82565 ASSAY OF CREATININE: CPT

## 2019-07-10 PROCEDURE — C1769: CPT

## 2019-07-17 ENCOUNTER — APPOINTMENT (OUTPATIENT)
Dept: NEUROSURGERY | Facility: CLINIC | Age: 64
End: 2019-07-17
Payer: COMMERCIAL

## 2019-07-17 VITALS
DIASTOLIC BLOOD PRESSURE: 86 MMHG | HEART RATE: 91 BPM | WEIGHT: 143 LBS | HEIGHT: 64 IN | BODY MASS INDEX: 24.41 KG/M2 | TEMPERATURE: 97.8 F | RESPIRATION RATE: 17 BRPM | SYSTOLIC BLOOD PRESSURE: 134 MMHG | OXYGEN SATURATION: 95 %

## 2019-07-17 DIAGNOSIS — I63.9 CEREBRAL INFARCTION, UNSPECIFIED: ICD-10-CM

## 2019-07-17 PROCEDURE — 99214 OFFICE O/P EST MOD 30 MIN: CPT

## 2019-07-17 NOTE — ASSESSMENT
[FreeTextEntry1] : Impression: 63yr old female s/p endovascular embolization of unruptured left middle cerebral artery aneurysm with WEB 6/21/2019\par \par Plan: \par Reviewed patients MRI brain results with her which show new ischemic changes. These changes explain her symptoms of aphasia and right hand weakness. Suspect that as the aneurysm is forming clot and progressing towards embolization some of that clot is escaping the aneurysm and causing these new ischemic changes. \par Continue aspirin 325mg daily \par Continue plavix 75mg daily (PRU 1 from 7/9/2019)\par MRA brain head non con in three months September/October 2019\par MRI brain head non con in three months September/October 2019\par Follow up in the office with Dr. Moreira in three months \par Repeat Cerebral Angiogram in 6months\par Educated patient on signs and symptoms of a stroke and should she develop these symptoms again she needs to seek medical attention immediately. \par \par

## 2019-07-17 NOTE — CONSULT LETTER
[Dear  ___] : Dear  [unfilled], [Consult Letter:] : I had the pleasure of evaluating your patient, [unfilled]. [Consult Closing:] : Thank you very much for allowing me to participate in the care of this patient.  If you have any questions, please do not hesitate to contact me. [DrJourdan  ___] : Dr. ALFREDO [DrJourdan ___] : Dr. ALFREDO [FreeTextEntry1] : As you know she is a 63yr old female with a past medical history of COPD, Hep C with moderate cirrhosis, and lung cancer.  She underwent MRI of the brain to evaluate for metastatic disease and it was significant for an unruptured large left middle cerebral artery aneurysm. Her social history is significant for former smoker and her family history consists of parents having a stroke but unsure if subarachnoid hemorrhage was present. She underwent diagnostic cerebral angiography 4/17/2019 which confirmed the presence of a 9mm left middle cerebral artery aneurysm. Due to the size and location of this aneurysm I recommend treatment. On June 21, 2019 she underwent cerebral angiography and embolization of the left middle cerebral artery aneurysm with WEB. She tolerated the procedure well and recovered in the Neurosurgical ICU post operatively.  At her last office visit she was neurologically intact 6/26/2019. She presented to [facility] with right hand weakness and difficulty speaking 7/8/2019. She had MRI brain done which showed some new ischemic changes. She was started on plavix 75mg in addition to her aspirin 325mg daily. Once in stable condition she was discharged home. Today her speech has improved and is fluent. Her right hand grasp is strong and there is no ataxia present. I recommend continuing the dual antiplatelet therapy and repeat MRI brain and MRA brain in three months. \par  [FreeTextEntry3] : \par Sincerely,\par \par Jaguar Moreira MD\par Professor of Neurological Surgery and Radiology\par  Department of Neurosurgery\par Director Cerebrovascular Surgery\par [facility] [employer] at [facility]\par

## 2019-07-17 NOTE — RESULTS/DATA
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 \par \par Through the brain, sagittal and axial T1, axial T2, FLAIR, diffusion \par weighted images and an ADC map were obtained. Axial T1 post-contrast images \par were obtained and reconstructed in sagittal and coronal planes. 7 cc of \par intravenous gadolinium was administered and 0.5 discarded, for contrast MRI \par brain. \par \par MR angiography of the intracranial was performed with time of flight \par imaging technique. Maximal intensity projection images were reviewed in \par multiple planes. \par \par COMPARISON: Head CT and CTA angiogram and neck 7/7/2019, MRI and MRA brain \par and neck, 6/22/2019, cerebral angiogram 6/21/2019, 4/17/2019 \par \par FINDINGS: \par Brain MRI: \par \par There is restricted diffusion with hyperintense DWI, T2, and FLAIR signal in \par the left posterior frontal and right precentral gyrus with scattered \par punctate foci in the left parietal lobe consistent new evolving ischemia \par without hemorrhagic transformation. \par There is redemonstration of susceptibility along the distal left M1 at the \par left M1 M2 junction consistent with artifact from patient's known left MCA \par bifurcation aneurysm embolization. This region demonstrates slight irregular \par enhancement likely from evolving ischemia with blood brain barrier \par breakdown. No new enhancing mass lesions in brain parenchyma or meninges are \par identified. \par \par Ventricles and sulci remain within normal to size for age. There is \par redemonstration of low lying cerebral tonsils extending approximately 3 mm \par below the foramen magnum, left side greater than right. There is \par redemonstration of additional nonspecific foci of T2/FLAIR signal \par hyperintensity within the hemispheric white matter, likely microvascular \par disease with remote lacunar infarcts in the lentiform nuclei, and prominent \par perivascular Virchow-Dre spaces unchanged. There is no intraparenchymal \par hematoma or midline shift. No new abnormal extra-axial fluid collections are \par present. Basal cisterns remain patent. \par \par The calvarium is intact. Orbits are unchanged, there is mucosal thickening \par with retention cyst or polyps in the left maxillary sinus and scattered \par mucosal thickening within the right maxillary, ethmoid, frontal and sphenoid \par sinuses with bubbly secretions in the left sphenoid and right frontal sinus. \par Tympanomastoid cavities remain free of acute disease. Calvarium is unchanged. \par \par Brain MRA: \par \par There is redemonstration of susceptibility artifact from the left middle \par cerebral artery bifurcation aneurysm embolization with non-delineation of \par flow along the distal left M1 and proximal left M2 vessels. As noted on CTA, \par interval new narrowing and irregularity along the left M2 MCA branches with \par decreased size and number of the distal left M3 MCA branch vasculature \par (series 300 image 7), unchanged from CTA, more pronounced compared to prior \par MRA of 6/22/2019. \par \par Redemonstration of patent flow in the bilateral internal carotid arteries in \par the distal cervical, petrous and supraclinoid segments with unchanged \par stenosis of the cavernous and supraclinoid segments, CT demonstrates \par atherosclerotic vascular calcification. \par \par Redemonstration of a dominant patent right A1 and hypoplastic patent left \par A1, anterior communicating and proximal A2 anterior cerebral arteries remain \par patent and unchanged. Proximal M1 vessels remain patent and unchanged, there \par is redemonstration of a bulbous appearance to the right M1/M2 junction, tiny \par developing aneurysm not excluded, suggest follow-up to ensure stability. \par Proximal right M2 vessels remain unchanged, with scattered stenosis along \par the distal right M2 MCA branch vessels. \par \par There is redemonstration of patent flow related enhancement within the \par intradural vertebral arteries with a tortuous left vertebral artery \par flattening the medullary pyramid crossing the right of midline. Basilar \par artery is dolichoectatic and unchanged, proximal P1 posterior cerebral \par artery segments remain patent, with slight stenosis along the distal PCA \par branches. \par \par \par IMPRESSION: \par \par Restricted diffusion with hyperintense DWI, T2, and FLAIR signal in the left \par posterior frontal and right parietal pre and postcentral gyrus with edema \par and mass effect consistent with evolving ischemic change without hemorrhagic \par transformation. \par \par Ventricles and sulci are unchanged, demonstration of microvascular disease \par and remote lacunar infarcts. No new large hemorrhage or midline shift. Basal \par cisterns remain patent. \par \par Redemonstration unchanged left MCA bifurcation aneurysm embolization causing \par susceptibly artifact, there are more pronounced stenosis of the left M2 MCA \par branches, and decreased size and number of distal left M3 MCA branch \par vasculature. \par \par Unchanged bulbous right M1 M2 junction, suggest follow up to ensure \par stability, tortuous intracranial circulation likely reflecting hypertension. \par \par Findings discussed with RUSTY Reyes in the CDU at immediate time of review on \par 7/8/2019 at 7:05 AM. \par \par \par \par \par \par \par \par \par \par KEANU HOPKINS M.D., ATTENDING RADIOLOGIST \par This document has been electronically signed. Jul 8 2019 7:39AM \par

## 2019-07-17 NOTE — PHYSICAL EXAM
[General Appearance - Alert] : alert [General Appearance - In No Acute Distress] : in no acute distress [Person] : oriented to person [Place] : oriented to place [Time] : oriented to time [Fluency] : fluency intact [Cranial Nerves Optic (II)] : visual acuity intact bilaterally,  pupils equal round and reactive to light [Cranial Nerves Oculomotor (III)] : extraocular motion intact [Cranial Nerves Trigeminal (V)] : facial sensation intact symmetrically [Cranial Nerves Facial (VII)] : face symmetrical [Cranial Nerves Vestibulocochlear (VIII)] : hearing was intact bilaterally [Cranial Nerves Glossopharyngeal (IX)] : tongue and palate midline [Cranial Nerves Accessory (XI - Cranial And Spinal)] : head turning and shoulder shrug symmetric [Cranial Nerves Hypoglossal (XII)] : there was no tongue deviation with protrusion [Motor Strength] : muscle strength was normal in all four extremities [Involuntary Movements] : no involuntary movements were seen [Coordination - Dysmetria Impaired Finger-to-Nose Bilateral] : present bilaterally [Coordination - Dysmetria Impaired Heel-to-Shin Bilateral] : present bilaterally [] : no respiratory distress [Abnormal Walk] : normal gait

## 2019-07-17 NOTE — REASON FOR VISIT
[Follow-Up: _____] : a [unfilled] follow-up visit [FreeTextEntry1] : Bree Doss is a 63yr old female here for a hospital follow up visit. She presented to [facility] with aphasia and right hand weakness July 9, 2019.  MRI brain was done at that time and showed two new small infarcts. She was started back on Plavix 75mg daily in addition to her Aspirin 325mg daily and discharged home advised to follow up outpatient with Dr. Moreira. Since then her speech has improved, it is fluent again. She has difficulty finding words a few times a week but it is manageable. Her right hand is getting stronger as well and she has full function of it. She has returned to work, she works as a bookeeper and feels work is helping her heal in regards to using her right hand. \par \par HPI Bree Doss is a 63yr old female here for a follow up visit having endovascular embolization of left middle cerebral artery aneurysm with WEB device 6/21/2019. She tolerated the procedure well. While she was recovering in the hospital post op she began having difficulty finding words 6/22/2019. An MRI of the brain was done and demonstrated a small area of restricted diffusion. Her symptoms improved 6/23/2019 and was discharged home in stable condition. Since she has been home she has had mild headaches 4/10 occurring a few times a week, she takes tylenol and they are relieved. Her speech has returned to normal.

## 2019-08-22 ENCOUNTER — APPOINTMENT (OUTPATIENT)
Dept: NEUROLOGY | Facility: CLINIC | Age: 64
End: 2019-08-22
Payer: COMMERCIAL

## 2019-08-22 DIAGNOSIS — G47.33 OBSTRUCTIVE SLEEP APNEA (ADULT) (PEDIATRIC): ICD-10-CM

## 2019-08-22 DIAGNOSIS — R06.83 SNORING: ICD-10-CM

## 2019-08-22 PROCEDURE — 99214 OFFICE O/P EST MOD 30 MIN: CPT

## 2019-08-22 RX ORDER — ATORVASTATIN CALCIUM 20 MG/1
20 TABLET, FILM COATED ORAL
Refills: 0 | Status: ACTIVE | COMMUNITY

## 2019-08-22 RX ORDER — HYDROCHLOROTHIAZIDE AND TRIAMTERENE 25; 37.5 MG/1; MG/1
37.5-25 CAPSULE ORAL
Refills: 0 | Status: ACTIVE | COMMUNITY

## 2019-08-22 RX ORDER — LEVOTHYROXINE SODIUM 88 UG/1
88 TABLET ORAL
Refills: 0 | Status: ACTIVE | COMMUNITY

## 2019-10-18 ENCOUNTER — APPOINTMENT (OUTPATIENT)
Dept: MRI IMAGING | Facility: CLINIC | Age: 64
End: 2019-10-18
Payer: COMMERCIAL

## 2019-10-18 ENCOUNTER — OUTPATIENT (OUTPATIENT)
Dept: OUTPATIENT SERVICES | Facility: HOSPITAL | Age: 64
LOS: 1 days | End: 2019-10-18
Payer: COMMERCIAL

## 2019-10-18 DIAGNOSIS — Z87.39 PERSONAL HISTORY OF OTHER DISEASES OF THE MUSCULOSKELETAL SYSTEM AND CONNECTIVE TISSUE: Chronic | ICD-10-CM

## 2019-10-18 DIAGNOSIS — Z90.2 ACQUIRED ABSENCE OF LUNG [PART OF]: Chronic | ICD-10-CM

## 2019-10-18 DIAGNOSIS — I67.1 CEREBRAL ANEURYSM, NONRUPTURED: ICD-10-CM

## 2019-10-18 DIAGNOSIS — Z98.890 OTHER SPECIFIED POSTPROCEDURAL STATES: Chronic | ICD-10-CM

## 2019-10-18 DIAGNOSIS — Z90.89 ACQUIRED ABSENCE OF OTHER ORGANS: Chronic | ICD-10-CM

## 2019-10-18 PROCEDURE — 70551 MRI BRAIN STEM W/O DYE: CPT

## 2019-10-18 PROCEDURE — 70551 MRI BRAIN STEM W/O DYE: CPT | Mod: 26

## 2019-10-18 PROCEDURE — 70544 MR ANGIOGRAPHY HEAD W/O DYE: CPT

## 2019-10-18 PROCEDURE — 70544 MR ANGIOGRAPHY HEAD W/O DYE: CPT | Mod: 26,59

## 2019-11-07 ENCOUNTER — APPOINTMENT (OUTPATIENT)
Dept: NEUROSURGERY | Facility: CLINIC | Age: 64
End: 2019-11-07
Payer: COMMERCIAL

## 2019-11-07 VITALS
TEMPERATURE: 91.1 F | HEART RATE: 96 BPM | OXYGEN SATURATION: 100 % | WEIGHT: 155 LBS | BODY MASS INDEX: 26.46 KG/M2 | DIASTOLIC BLOOD PRESSURE: 92 MMHG | HEIGHT: 64 IN | SYSTOLIC BLOOD PRESSURE: 155 MMHG

## 2019-11-07 PROCEDURE — 99213 OFFICE O/P EST LOW 20 MIN: CPT

## 2019-11-08 NOTE — CONSULT LETTER
[Dear  ___] : Dear  [unfilled], [Consult Letter:] : I had the pleasure of evaluating your patient, [unfilled]. [Consult Closing:] : Thank you very much for allowing me to participate in the care of this patient.  If you have any questions, please do not hesitate to contact me. [DrJourdan  ___] : Dr. ALFREDO [FreeTextEntry1] : As you know she is a 63yr old female with a past medical history of COPD, Hep C with moderate cirrhosis, and lung cancer.  She underwent MRI of the brain to evaluate for metastatic disease and it was significant for an unruptured large left middle cerebral artery aneurysm. Her social history is significant for former smoker and her family history consists of parents having a stroke but unsure if subarachnoid hemorrhage was present. She underwent diagnostic cerebral angiography 4/17/2019 which confirmed the presence of a 9mm left middle cerebral artery aneurysm. Due to the size and location of this aneurysm I recommend treatment. On June 21, 2019 she underwent cerebral angiography and embolization of the left middle cerebral artery aneurysm with WEB. She tolerated the procedure well and recovered in the Neurosurgical ICU post operatively.  Follow up MRI showed two small old infarcts on DWI. Follow up MRA showed no flow to the aneurysm. She can stop Plavix at this time. I recommend she continue aspirin 325mg daily. Repeat cerebral angiography February 2020. \par  [FreeTextEntry3] : \par Sincerely,\par \par Jaguar Moreira MD\par Professor of Neurological Surgery and Radiology\par  Department of Neurosurgery\par Director Cerebrovascular Surgery\par [facility] [employer] [facility]\par

## 2019-11-08 NOTE — REASON FOR VISIT
[Follow-Up: _____] : a [unfilled] follow-up visit [Spouse] : spouse [FreeTextEntry1] : Bree Doss is a 63yr old female here for a follow up visit. She had endovascular embolization of left middle cerebral artery aneurysm with WEB device 6/21/2019. A few weeks after the procedure she had two small infarcts on MRI brain when she presented with word finding difficulty and right hand weakness. Today she feels well, the word finding difficulty has resolved, occurs occasionally at night when she is tired. The right hand weakness is gone but she complain of numbness in her fingertips and difficulty with fine motor skills.

## 2019-11-08 NOTE — RESULTS/DATA
[FreeTextEntry1] : EXAM: MR ANGIO BRAIN \par \par \par PROCEDURE DATE: 10/18/2019 \par \par \par \par INTERPRETATION: Exam Date: 10/18/2019 5:24 PM \par \par MR angiography of the intracranial circulation. (Non gadolinium technique.) \par \par CLINICAL INFORMATION: STROKE FOLLOW UP \par \par TECHNIQUE: MR angiography of intracranial circulation was performed using \par three-dimensional time of flight technique. Post processing angiographic \par reconstruction of images was performed. This data set was reconstructed as \par maximum intensity pixel images and displayed in multiple rotations. \par \par FINDINGS: Comparison is made to MRA and CTA of 7/7/2019. \par \par Reidentified is likely in the distal left MCA cistern, compatible with \par artifactual signal from aneurysmal embolization material. No new aneurysm is \par identified within the intracranial circulation. No hemodynamically \par significant stenosis of the anterior or posterior circulation. \par \par \par \par IMPRESSION: \par \par Stable exam as prior MRI of 7/7/2019. \par \par \par \par \par

## 2019-11-08 NOTE — PHYSICAL EXAM
[Person] : oriented to person [General Appearance - In No Acute Distress] : in no acute distress [General Appearance - Alert] : alert [Place] : oriented to place [Time] : oriented to time [Cranial Nerves Optic (II)] : visual acuity intact bilaterally,  pupils equal round and reactive to light [Cranial Nerves Oculomotor (III)] : extraocular motion intact [Cranial Nerves Trigeminal (V)] : facial sensation intact symmetrically [Cranial Nerves Vestibulocochlear (VIII)] : hearing was intact bilaterally [Cranial Nerves Facial (VII)] : face symmetrical [Cranial Nerves Accessory (XI - Cranial And Spinal)] : head turning and shoulder shrug symmetric [Cranial Nerves Glossopharyngeal (IX)] : tongue and palate midline [Cranial Nerves Hypoglossal (XII)] : there was no tongue deviation with protrusion [Motor Strength] : muscle strength was normal in all four extremities [Involuntary Movements] : no involuntary movements were seen [] : no respiratory distress [Abnormal Walk] : normal gait

## 2019-11-08 NOTE — REVIEW OF SYSTEMS
[Poor Coordination] : poor coordination [As Noted in HPI] : as noted in HPI [Numbness] : numbness [Tingling] : tingling [Abnormal Sensation] : an abnormal sensation [Negative] : Heme/Lymph

## 2019-11-08 NOTE — ASSESSMENT
[FreeTextEntry1] : Impression: 63yr old female s/p endovascular embolization of unruptured left middle cerebral artery aneurysm with WEB 6/21/2019\par \par Plan: \par Reviewed MRI brain which shows no new strokes, MRA brain shows aneurysm well occluded \par Ok to stop plavix \par Continue aspirin 325mg daily\par Cerebral Angiogram January/February 2020\par Refer to PT or OT for her right hand symptoms

## 2019-11-13 NOTE — ED CDU PROVIDER SUBSEQUENT DAY NOTE - PMH
For this type of infection, treatment of family or household contacts is not needed unless they develop symptoms.  For influenza, we often give prophylactic treatment to contacts, but not for bacterial pneumonia.    Cerebral aneurysm  left MCA 9mm  s/p cerebral angio 4/2019  COPD with emphysema  stable, denies hospitalizations/ ER visits  Hepatitis C  in remission for 12 years. Moderate damage done to Liver.    HTN (hypertension)    Liver cirrhosis  stable  Lung cancer  s/p chemo last summer, s/p RUL lobectomy 5/2018 at [place] with Dr. Clayton

## 2019-11-28 ENCOUNTER — EMERGENCY (EMERGENCY)
Facility: HOSPITAL | Age: 64
LOS: 1 days | Discharge: HOME | End: 2019-11-28
Payer: COMMERCIAL

## 2019-11-28 VITALS
SYSTOLIC BLOOD PRESSURE: 168 MMHG | DIASTOLIC BLOOD PRESSURE: 96 MMHG | TEMPERATURE: 97 F | RESPIRATION RATE: 16 BRPM | OXYGEN SATURATION: 97 % | HEART RATE: 101 BPM

## 2019-11-28 VITALS
SYSTOLIC BLOOD PRESSURE: 147 MMHG | RESPIRATION RATE: 18 BRPM | HEART RATE: 88 BPM | TEMPERATURE: 98 F | DIASTOLIC BLOOD PRESSURE: 79 MMHG | OXYGEN SATURATION: 98 %

## 2019-11-28 DIAGNOSIS — Z90.89 ACQUIRED ABSENCE OF OTHER ORGANS: Chronic | ICD-10-CM

## 2019-11-28 DIAGNOSIS — Z87.39 PERSONAL HISTORY OF OTHER DISEASES OF THE MUSCULOSKELETAL SYSTEM AND CONNECTIVE TISSUE: Chronic | ICD-10-CM

## 2019-11-28 DIAGNOSIS — Z98.890 OTHER SPECIFIED POSTPROCEDURAL STATES: Chronic | ICD-10-CM

## 2019-11-28 DIAGNOSIS — Z90.2 ACQUIRED ABSENCE OF LUNG [PART OF]: Chronic | ICD-10-CM

## 2019-11-28 LAB
ALBUMIN SERPL ELPH-MCNC: 4.8 G/DL — SIGNIFICANT CHANGE UP (ref 3.3–5)
ALP SERPL-CCNC: 117 U/L — SIGNIFICANT CHANGE UP (ref 40–120)
ALT FLD-CCNC: 37 U/L — SIGNIFICANT CHANGE UP (ref 10–45)
ANION GAP SERPL CALC-SCNC: 17 MMOL/L — SIGNIFICANT CHANGE UP (ref 5–17)
APTT BLD: 28.7 SEC — SIGNIFICANT CHANGE UP (ref 27.5–36.3)
AST SERPL-CCNC: 34 U/L — SIGNIFICANT CHANGE UP (ref 10–40)
BASOPHILS # BLD AUTO: 0.13 K/UL — SIGNIFICANT CHANGE UP (ref 0–0.2)
BASOPHILS NFR BLD AUTO: 1.6 % — SIGNIFICANT CHANGE UP (ref 0–2)
BILIRUB SERPL-MCNC: 0.4 MG/DL — SIGNIFICANT CHANGE UP (ref 0.2–1.2)
BUN SERPL-MCNC: 17 MG/DL — SIGNIFICANT CHANGE UP (ref 7–23)
CALCIUM SERPL-MCNC: 10 MG/DL — SIGNIFICANT CHANGE UP (ref 8.4–10.5)
CHLORIDE SERPL-SCNC: 101 MMOL/L — SIGNIFICANT CHANGE UP (ref 96–108)
CO2 SERPL-SCNC: 24 MMOL/L — SIGNIFICANT CHANGE UP (ref 22–31)
CREAT SERPL-MCNC: 0.78 MG/DL — SIGNIFICANT CHANGE UP (ref 0.5–1.3)
EOSINOPHIL # BLD AUTO: 0.44 K/UL — SIGNIFICANT CHANGE UP (ref 0–0.5)
EOSINOPHIL NFR BLD AUTO: 5.4 % — SIGNIFICANT CHANGE UP (ref 0–6)
GLUCOSE SERPL-MCNC: 199 MG/DL — HIGH (ref 70–99)
HCT VFR BLD CALC: 45 % — SIGNIFICANT CHANGE UP (ref 34.5–45)
HGB BLD-MCNC: 14.9 G/DL — SIGNIFICANT CHANGE UP (ref 11.5–15.5)
IMM GRANULOCYTES NFR BLD AUTO: 0.5 % — SIGNIFICANT CHANGE UP (ref 0–1.5)
INR BLD: 0.93 RATIO — SIGNIFICANT CHANGE UP (ref 0.88–1.16)
LYMPHOCYTES # BLD AUTO: 2.77 K/UL — SIGNIFICANT CHANGE UP (ref 1–3.3)
LYMPHOCYTES # BLD AUTO: 33.7 % — SIGNIFICANT CHANGE UP (ref 13–44)
MCHC RBC-ENTMCNC: 28.7 PG — SIGNIFICANT CHANGE UP (ref 27–34)
MCHC RBC-ENTMCNC: 33.1 GM/DL — SIGNIFICANT CHANGE UP (ref 32–36)
MCV RBC AUTO: 86.5 FL — SIGNIFICANT CHANGE UP (ref 80–100)
MONOCYTES # BLD AUTO: 0.57 K/UL — SIGNIFICANT CHANGE UP (ref 0–0.9)
MONOCYTES NFR BLD AUTO: 6.9 % — SIGNIFICANT CHANGE UP (ref 2–14)
NEUTROPHILS # BLD AUTO: 4.27 K/UL — SIGNIFICANT CHANGE UP (ref 1.8–7.4)
NEUTROPHILS NFR BLD AUTO: 51.9 % — SIGNIFICANT CHANGE UP (ref 43–77)
NRBC # BLD: 0 /100 WBCS — SIGNIFICANT CHANGE UP (ref 0–0)
PLATELET # BLD AUTO: 236 K/UL — SIGNIFICANT CHANGE UP (ref 150–400)
POTASSIUM SERPL-MCNC: 3.5 MMOL/L — SIGNIFICANT CHANGE UP (ref 3.5–5.3)
POTASSIUM SERPL-SCNC: 3.5 MMOL/L — SIGNIFICANT CHANGE UP (ref 3.5–5.3)
PROT SERPL-MCNC: 7.7 G/DL — SIGNIFICANT CHANGE UP (ref 6–8.3)
PROTHROM AB SERPL-ACNC: 10.7 SEC — SIGNIFICANT CHANGE UP (ref 10–12.9)
RBC # BLD: 5.2 M/UL — SIGNIFICANT CHANGE UP (ref 3.8–5.2)
RBC # FLD: 13.6 % — SIGNIFICANT CHANGE UP (ref 10.3–14.5)
SODIUM SERPL-SCNC: 142 MMOL/L — SIGNIFICANT CHANGE UP (ref 135–145)
WBC # BLD: 8.22 K/UL — SIGNIFICANT CHANGE UP (ref 3.8–10.5)
WBC # FLD AUTO: 8.22 K/UL — SIGNIFICANT CHANGE UP (ref 3.8–10.5)

## 2019-11-28 PROCEDURE — 82962 GLUCOSE BLOOD TEST: CPT

## 2019-11-28 PROCEDURE — 85730 THROMBOPLASTIN TIME PARTIAL: CPT

## 2019-11-28 PROCEDURE — 70498 CT ANGIOGRAPHY NECK: CPT

## 2019-11-28 PROCEDURE — 70496 CT ANGIOGRAPHY HEAD: CPT | Mod: 26

## 2019-11-28 PROCEDURE — 85610 PROTHROMBIN TIME: CPT

## 2019-11-28 PROCEDURE — 70551 MRI BRAIN STEM W/O DYE: CPT | Mod: 26

## 2019-11-28 PROCEDURE — 99291 CRITICAL CARE FIRST HOUR: CPT

## 2019-11-28 PROCEDURE — 70551 MRI BRAIN STEM W/O DYE: CPT

## 2019-11-28 PROCEDURE — 70496 CT ANGIOGRAPHY HEAD: CPT

## 2019-11-28 PROCEDURE — 70498 CT ANGIOGRAPHY NECK: CPT | Mod: 26

## 2019-11-28 PROCEDURE — 99285 EMERGENCY DEPT VISIT HI MDM: CPT | Mod: 25

## 2019-11-28 PROCEDURE — 99284 EMERGENCY DEPT VISIT MOD MDM: CPT

## 2019-11-28 PROCEDURE — 72141 MRI NECK SPINE W/O DYE: CPT | Mod: 26

## 2019-11-28 PROCEDURE — 93005 ELECTROCARDIOGRAM TRACING: CPT

## 2019-11-28 PROCEDURE — 0042T: CPT

## 2019-11-28 PROCEDURE — 72141 MRI NECK SPINE W/O DYE: CPT

## 2019-11-28 PROCEDURE — 85027 COMPLETE CBC AUTOMATED: CPT

## 2019-11-28 PROCEDURE — 80053 COMPREHEN METABOLIC PANEL: CPT

## 2019-11-28 PROCEDURE — 70450 CT HEAD/BRAIN W/O DYE: CPT

## 2019-11-28 RX ORDER — HYDRALAZINE HCL 50 MG
10 TABLET ORAL ONCE
Refills: 0 | Status: DISCONTINUED | OUTPATIENT
Start: 2019-11-28 | End: 2019-11-28

## 2019-11-28 RX ADMIN — Medication 0.5 MILLIGRAM(S): at 12:29

## 2019-11-28 NOTE — ED PROVIDER NOTE - NSFOLLOWUPINSTRUCTIONS_ED_ALL_ED_FT
-Follow-up with neurology within 1 week. The neurologists recommend you have a MRI with IV contrast to follow-up on the findings seen on your MRI in the ED.  -Return to the Emergency Department for any worsening or concerning symptoms such as fevers, severe pain, trouble breathing, weakness or lightheadedness, slurred speech, trouble walking.

## 2019-11-28 NOTE — ED PROVIDER NOTE - PHYSICAL EXAMINATION
*GEN:   in no acute distress, AOx3  *EYES:   pupils equally round and reactive to light, extra-occular movements intact  *HEENT:   airway patent, moist mucosal membranes  *CV:   regular rate and rhythm  *RESP:   clear to auscultation bilaterally, non-labored  *ABD:   soft, non-tender  *:   no cva/flank tenderness  *EXTREM:   no MSK tenderness, full ROM throughout, no leg swelling  *SKIN:   dry, intact  *NEURO:   AOx3, cranial nerves intact throughout, strength slightly diminished RUE, no focal loss of sensation, no pronator drift, finger/nose normal

## 2019-11-28 NOTE — CONSULT NOTE ADULT - ASSESSMENT
63Y R handed  female presents to ED from home with resolved c/o RUE weakness and difficulty finding words since morning 11/28.  MRS 0, NIHSS 0, LKN 4 AM 11/28.  Sig PMH/PSH:  HTN (on ASA 325mg), COPD not on home O2, former smoker with lung cancer s/p RUL lobectomy with chemo, HCV with liver cirrhosis, incidentally found to have 10mm L MCA aneurysm on CTA 5/2019 s/p web embolization on 6/21/19.   Patient reports several similar episodes over summer 2019.  Patient followed by Dr Dc neurosurgery and Loan Teran NP.    Impression:  Given non-acute findings on CTH / CTA&N, and resolved  symptoms, likely TIA or recrudescence.  Can't rule out additional strokes since prior MR.  - c/w current meds  - MRI with/without contrast   - f/u within one week with Loan Teran NP Neurology   - d/c recommendation pending MRI results

## 2019-11-28 NOTE — ED PROVIDER NOTE - CRITICAL CARE PROVIDED
interpretation of diagnostic studies/documentation/additional history taking/consultation with other physicians/consult w/ pt's family directly relating to pts condition/conducted a detailed discussion of DNR status/direct patient care (not related to procedure)

## 2019-11-28 NOTE — ED ADULT TRIAGE NOTE - CHIEF COMPLAINT QUOTE
pt with pmhx of multiple mini cva's and L aneurysm with net in place c/o difficulty speaking x 0830 for 1 hr that has since subsided associated with neck down RUE weakness with pain and tingling.  last known well at approx 0400.

## 2019-11-28 NOTE — ED PROVIDER NOTE - PMH
Cerebral aneurysm  left MCA 9mm  s/p cerebral angio 4/2019  COPD with emphysema  stable, denies hospitalizations/ ER visits  Hepatitis C  in remission for 12 years. Moderate damage done to Liver.    HTN (hypertension)    Liver cirrhosis  stable  Lung cancer  s/p chemo last summer, s/p RUL lobectomy 5/2018 at [place] with Dr. Clayton

## 2019-11-28 NOTE — ED PROVIDER NOTE - OBJECTIVE STATEMENT
64F w/ pmh HTN, HLD, COPD not on home O2, hypothyroidism, former smoker with lung cancer s/p RUL lobectomy with chemo, HCV with liver cirrhosis, prior 10mm L MCA aneurysm s/p web embolization on 6/21/19, c/b L MCA stroke 7/8/2019 -- p/w slurred speech and RUE weakness @8am today, talking with son and lost balance fell forward - caught by son before hit ground. 4am last known normal. Pt AOx3 here, reports sx improved. No fever, n/v/d/c, chest / abd pain, cough, sob, dysuria/hematuria. No recent travel, illness, or hospitalization.     Stopped taking plavix 11/8, not on ASA.

## 2019-11-28 NOTE — ED PROVIDER NOTE - PROGRESS NOTE DETAILS
Deepak Molina PGY3: per neuro - no TPA at this time, outside of window, pending CT results to determine continued managment ak:: spoke to neuro, mri reviewed. Does NOT think the new mri findings are related to symptoms, not a a stroke, and safe to DC from neuro perspective. f/u outpt.

## 2019-11-28 NOTE — ED PROVIDER NOTE - PATIENT PORTAL LINK FT
You can access the FollowMyHealth Patient Portal offered by [facility] by registering at the following website: [email or web address]. By joining [facility]’s FollowMyHealth portal, you will also be able to view your health information using other applications (apps) compatible with our system.

## 2019-11-28 NOTE — ED ADULT NURSE NOTE - OBJECTIVE STATEMENT
64 year old female with a past medical hx of HTN, Lung CA, Liver cirrhosis, presents with right upper extremity weakness dizziness, and slurred speech that started at [id number] this morning which has since resolved. Patient arrived to the emergency room at 1122 this morning and a code stroke was initiated on arrival. Patient has a NIH stroke scale of 0 on arrival. On exam patient has equal strength and sensation bilaterally. Dizziness is still endorsed and is worsened upon standing and ambulating but with no evidence of ataxia. Pupils 3mm bilaterally PERRLA with no evidence of nystagmus. Lung sounds are clear and equal bilaterally. Abdomen is soft and non tender to palpation. Denies chest pain, shortness of breath, fevers, chills, visual changes, headache.

## 2019-11-28 NOTE — ED PROVIDER NOTE - ATTENDING CONTRIBUTION TO CARE
MD Hensley:  patient seen and evaluated with the resident.  I was present for key portions of the History & Physical, and I agree with the Impression & Plan.  MD Hensley:  64y Female complaining of HA, slurred speech, and R hand weakness.  Onset: 8am.  Quality:  like prior TIAs that she sustained after a L aneurysmal coiling 5 mos ago.  Overall symptoms are improving, but hand feels numb.    VS: wnl.  Physical Exam: adult F, NAD, NCAT, PERRL, EOMI, neck supple, RRR, CTA B, Abd: s/nd/nt, Ext: no edema, Neuro: AAOx3, ambulates w/o diff, strength 5/5 & symmetric throughout.    Impression/Plan:  possible stroke/TIA in high risk patient.  +code stroke.

## 2019-11-28 NOTE — CONSULT NOTE ADULT - ATTENDING COMMENTS
Patient seen and examined, essentially back a baseline (minimal slowing in speech, after being given sedative for MRI). No drift, and no other neurological abnormalities. If MRI read is negative for stroke, can go home follow up with Loan Teran and Dr Dc.

## 2019-11-28 NOTE — ED ADULT NURSE NOTE - CHIEF COMPLAINT QUOTE
pt with pmhx of multiple mini cva's and L aneurysm with net in place c/o difficulty speaking x 0830 for 1 hr that has since subsided associated with neck down RUE weakness with pain and tingling.  last known well at approx 0400. Quality 431: Preventive Care And Screening: Unhealthy Alcohol Use - Screening: Patient screened for unhealthy alcohol use using a single question and scores less than 2 times per year Quality 226: Preventive Care And Screening: Tobacco Use: Screening And Cessation Intervention: Patient screened for tobacco use and is an ex/non-smoker Detail Level: Detailed

## 2019-11-28 NOTE — ED PROVIDER NOTE - NSFOLLOWUPCLINICS_GEN_ALL_ED_FT
[facility]  Neurology  [address] [place] [address] [address] [place] [address]  Phone: [phone]  Fax:   Follow Up Time:

## 2019-11-28 NOTE — CONSULT NOTE ADULT - SUBJECTIVE AND OBJECTIVE BOX
MRN-[id number]  Patient is a 64y old  Female who presents with a chief complaint of   HPI:    63Y R handed  female presents to ED from home with resolved c/o RUE weakness and difficulty finding words since morning 11/28.  MRS 0, NIHSS 0, LKN 4 AM 11/28.  Sig PMH/PSH:  HTN (on ASA 325mg), COPD not on home O2, former smoker with lung cancer s/p RUL lobectomy with chemo, HCV with liver cirrhosis, incidentally found to have 10mm L MCA aneurysm on CTA 5/2019 s/p web embolization on 6/21/19 who presented with L frontal HA, slurred speech and R hand weakness since 3:30 morning prior to arrival. Patient stated that she woke up and started talking to her cat and realized she was unable to articulate the words. When she tried to feed her cat, her R hand felt very weak and was unable to open the cat food properly. She stated that she started feeling better around 9am. She had a similar episode s/p her recent embolization procedure in June 2019 when she noticed she had some slurred speech, but no weakness or other neurologic deficits. Symptoms self resolved over the next 1-2 days.  Patient reports several similar episodes over summer 2019.  Patient followed by Dr Dc neurosurgery.      PAST MEDICAL & SURGICAL HISTORY:  Cerebral aneurysm: left MCA 9mm  s/p cerebral angio 4/2019  Liver cirrhosis: stable  HTN (hypertension)  Lung cancer: s/p chemo last summer, s/p RUL lobectomy 5/2018 at [facility] with Dr. Clayton  Hepatitis C: in remission for 12 years. Moderate damage done to Liver.    COPD with emphysema: stable, denies hospitalizations/ ER visits  History of eye surgery: as a baby , cyst under eyelid  H/O ganglion cyst: left wrist 24 yr ago  History of liver biopsy: 20 yr ago  S/P lobectomy of lung: right upper lobe  5/2018  S/P tonsillectomy    FAMILY HISTORY:    Social Hx:  Nonsmoker, no drug or alcohol use    Home Medications:  Breo Ellipta 100 mcg-25 mcg/inh inhalation powder: 1 puff(s) inhaled once a day (13 Jun 2019 16:27)  docusate sodium 100 mg oral capsule: 1 cap(s) orally 2 times a day (24 Jun 2019 11:22)  Incruse Ellipta 62.5 mcg/inh inhalation powder: 1 puff(s) inhaled once a day (13 Jun 2019 16:27)  ProAir HFA 90 mcg/inh inhalation aerosol: 2 puff(s) inhaled every 4 hours, As Needed (09 Jul 2019 15:55)  senna oral tablet: 1 tab(s) orally once a day (24 Jun 2019 11:22)  triamterene-hydrochlorothiazide 37.5 mg-25 mg oral tablet: 1 tab(s) orally once a day (13 Jun 2019 16:27)    MEDICATIONS  (STANDING):  LORazepam     Tablet 0.5 milliGRAM(s) Oral Once    MEDICATIONS  (PRN):    Allergies  penicillins (Rash)    Intolerances      REVIEW OF SYSTEMS    ROS: Pertinent positives in HPI, all other ROS were reviewed and are negative.      Vital Signs Last 24 Hrs  T(C): 36.7 (28 Nov 2019 11:54), Max: 36.7 (28 Nov 2019 11:54)  T(F): 98 (28 Nov 2019 11:54), Max: 98 (28 Nov 2019 11:54)  HR: 92 (28 Nov 2019 11:54) (92 - 101)  BP: 156/86 (28 Nov 2019 11:54) (156/86 - 168/96)  BP(mean): --  RR: 16 (28 Nov 2019 11:54) (16 - 16)  SpO2: 97% (28 Nov 2019 11:54) (97% - 97%)    GENERAL EXAM:    NEUROLOGICAL EXAM:  MS: AAOX3, fluent, attends b/l; recent and remote memory intact; normal attention, language and fund of knowledge.   CN: VFF, EOMI, PERRL, no LALO, no APD,  V1-3 intact, no facial asymmetry, t/p midline, SCM/trap intact.  Motor: Strength: 5/5 4x. Tone: normal. Bulk: normal. DTR not able to elicit 2/2 patient unable to relax tone when testing.  Plantar flex b/l toes up. Sensation: intact to LT 4x.   Coordination: intact 4x.   Gait:  Romberg negative, walks with narrow base, pivots in 2 steps.    NIHSS 0  mRS 0    Labs:   cbc                      14.9   8.22  )-----------( 236      ( 28 Nov 2019 11:36 )             45.0     [id number]    142  |  101  |  17  ----------------------------<  199<H>  3.5   |  24  |  0.78    Ca    10.0      28 Nov 2019 11:36    TPro  7.7  /  Alb  x   /  TBili  0.4  /  DBili  x   /  AST  34  /  ALT  37  /  AlkPhos  117  11-28    CoagsPT/INR - ( 28 Nov 2019 11:36 )   PT: 10.7 sec;   INR: 0.93 ratio         PTT - ( 28 Nov 2019 11:36 )  PTT:28.7 sec  Lipids  A1C  CardiacMarkers    LFTsLIVER FUNCTIONS - ( 28 Nov 2019 11:36 )  Alb: x     / Pro: 7.7 g/dL / ALK PHOS: 117 U/L / ALT: 37 U/L / AST: 34 U/L / GGT: x           UA  CSF  Immunological Labs    Radiology:  -CT Head  < from: CT Angio Neck w/ IV Cont (07.07.19 @ 11:53) >  CT brain: Status post left MCA bifurcation aneurysm embolization with   redemonstration of areas of slight decreased attenuation along the left   posterior frontal lobe involving the pre and postcentral gyrus, in the   region of previously demonstrated restricted diffusion on MRI of   6/22/2019, small foci of developing ischemia not excluded, repeat MRI may   be obtained for improved assessment as clinically warranted.. There is no   new intracranial hemorrhage, extra-axial collection, midline shift,   central herniation, hydrocephalus or transcortical infarct.     CT angiography neck: No  hemodynamically significant stenosis by NASCET   criteria. No evidence of vascular dissection.    CT angiography brain: Status post left MCA bifurcation aneurysm   embolization with a  stenosis of the distal right MCA branch vasculature.   Dolichoectatic intracranial circulation may reflect hypertension. Bulbous   distalright M1 and junction of right M2, suggest CT follow-up to ensure   stability.    < end of copied text > HPI:    63Y R handed  female presents to ED from home with resolved c/o RUE weakness and difficulty finding words since morning 11/28.  MRS 0, NIHSS 0, LKN 4 AM 11/28.  Sig PMH/PSH:  HTN (on ASA 325mg), COPD not on home O2, former smoker with lung cancer s/p RUL lobectomy with chemo, HCV with liver cirrhosis, incidentally found to have 10mm L MCA aneurysm on CTA 5/2019 s/p web embolization on 6/21/19 who presented with L frontal HA, slurred speech and R hand weakness since 3:30 morning prior to arrival. Patient stated that she woke up and started talking to her cat and realized she was unable to articulate the words. When she tried to feed her cat, her R hand felt very weak and was unable to open the cat food properly. She stated that she started feeling better around 9am. She had a similar episode s/p her recent embolization procedure in June 2019 when she noticed she had some slurred speech, but no weakness or other neurologic deficits. Symptoms self resolved over the next 1-2 days.  Patient reports several similar episodes over summer 2019.  Patient followed by Dr Dc neurosurgery.      PAST MEDICAL & SURGICAL HISTORY:  Cerebral aneurysm: left MCA 9mm  s/p cerebral angio 4/2019  Liver cirrhosis: stable  HTN (hypertension)  Lung cancer: s/p chemo last summer, s/p RUL lobectomy 5/2018 at [facility] with Dr. Clayton  Hepatitis C: in remission for 12 years. Moderate damage done to Liver.    COPD with emphysema: stable, denies hospitalizations/ ER visits  History of eye surgery: as a baby , cyst under eyelid  H/O ganglion cyst: left wrist 24 yr ago  History of liver biopsy: 20 yr ago  S/P lobectomy of lung: right upper lobe  5/2018  S/P tonsillectomy    FAMILY HISTORY:    Social Hx:  Nonsmoker, no drug or alcohol use    Home Medications:  Breo Ellipta 100 mcg-25 mcg/inh inhalation powder: 1 puff(s) inhaled once a day (13 Jun 2019 16:27)  docusate sodium 100 mg oral capsule: 1 cap(s) orally 2 times a day (24 Jun 2019 11:22)  Incruse Ellipta 62.5 mcg/inh inhalation powder: 1 puff(s) inhaled once a day (13 Jun 2019 16:27)  ProAir HFA 90 mcg/inh inhalation aerosol: 2 puff(s) inhaled every 4 hours, As Needed (09 Jul 2019 15:55)  senna oral tablet: 1 tab(s) orally once a day (24 Jun 2019 11:22)  triamterene-hydrochlorothiazide 37.5 mg-25 mg oral tablet: 1 tab(s) orally once a day (13 Jun 2019 16:27)    MEDICATIONS  (STANDING):  LORazepam     Tablet 0.5 milliGRAM(s) Oral Once    MEDICATIONS  (PRN):    Allergies  penicillins (Rash)    Intolerances      REVIEW OF SYSTEMS    ROS: Pertinent positives in HPI, all other ROS were reviewed and are negative.      Vital Signs Last 24 Hrs  T(C): 36.7 (28 Nov 2019 11:54), Max: 36.7 (28 Nov 2019 11:54)  T(F): 98 (28 Nov 2019 11:54), Max: 98 (28 Nov 2019 11:54)  HR: 92 (28 Nov 2019 11:54) (92 - 101)  BP: 156/86 (28 Nov 2019 11:54) (156/86 - 168/96)  BP(mean): --  RR: 16 (28 Nov 2019 11:54) (16 - 16)  SpO2: 97% (28 Nov 2019 11:54) (97% - 97%)    GENERAL EXAM:    NEUROLOGICAL EXAM:  MS: AAOX3, fluent, attends b/l; recent and remote memory intact; normal attention, language and fund of knowledge.   CN: VFF, EOMI, PERRL, no LALO, no APD,  V1-3 intact, no facial asymmetry, t/p midline, SCM/trap intact.  Motor: Strength: 5/5 4x. Tone: normal. Bulk: normal.   DTR not able to elicit 2/2 patient unable to relax tone when testing.  Plantar flex b/l toes up. Sensation: intact to LT 4x.   Coordination: intact 4x.   Gait:  Romberg negative, walks with narrow base, pivots in 2 steps.    NIHSS 0  mRS 0    Labs:   cbc                      14.9   8.22  )-----------( 236      ( 28 Nov 2019 11:36 )             45.0     [id number]    142  |  101  |  17  ----------------------------<  199<H>  3.5   |  24  |  0.78    Ca    10.0      28 Nov 2019 11:36    TPro  7.7  /  Alb  x   /  TBili  0.4  /  DBili  x   /  AST  34  /  ALT  37  /  AlkPhos  117  11-28    CoagsPT/INR - ( 28 Nov 2019 11:36 )   PT: 10.7 sec;   INR: 0.93 ratio         PTT - ( 28 Nov 2019 11:36 )  PTT:28.7 sec  Lipids  A1C  CardiacMarkers    LFTsLIVER FUNCTIONS - ( 28 Nov 2019 11:36 )  Alb: x     / Pro: 7.7 g/dL / ALK PHOS: 117 U/L / ALT: 37 U/L / AST: 34 U/L / GGT: x           UA  CSF  Immunological Labs    Radiology:  -CT Head  < from: CT Angio Neck w/ IV Cont (07.07.19 @ 11:53) >  CT brain: Status post left MCA bifurcation aneurysm embolization with   redemonstration of areas of slight decreased attenuation along the left   posterior frontal lobe involving the pre and postcentral gyrus, in the   region of previously demonstrated restricted diffusion on MRI of   6/22/2019, small foci of developing ischemia not excluded, repeat MRI may   be obtained for improved assessment as clinically warranted.. There is no   new intracranial hemorrhage, extra-axial collection, midline shift,   central herniation, hydrocephalus or transcortical infarct.     CT angiography neck: No  hemodynamically significant stenosis by NASCET   criteria. No evidence of vascular dissection.    CT angiography brain: Status post left MCA bifurcation aneurysm   embolization with a  stenosis of the distal right MCA branch vasculature.   Dolichoectatic intracranial circulation may reflect hypertension. Bulbous   distalright M1 and junction of right M2, suggest CT follow-up to ensure   stability.    < end of copied text >

## 2019-12-12 ENCOUNTER — APPOINTMENT (OUTPATIENT)
Dept: NEUROLOGY | Facility: CLINIC | Age: 64
End: 2019-12-12
Payer: COMMERCIAL

## 2019-12-12 ENCOUNTER — APPOINTMENT (OUTPATIENT)
Dept: NEUROSURGERY | Facility: CLINIC | Age: 64
End: 2019-12-12
Payer: COMMERCIAL

## 2019-12-12 VITALS
OXYGEN SATURATION: 94 % | DIASTOLIC BLOOD PRESSURE: 85 MMHG | HEIGHT: 64 IN | BODY MASS INDEX: 26.46 KG/M2 | SYSTOLIC BLOOD PRESSURE: 150 MMHG | WEIGHT: 155 LBS | HEART RATE: 89 BPM

## 2019-12-12 VITALS
SYSTOLIC BLOOD PRESSURE: 150 MMHG | HEART RATE: 89 BPM | TEMPERATURE: 97.7 F | OXYGEN SATURATION: 94 % | DIASTOLIC BLOOD PRESSURE: 85 MMHG | HEIGHT: 64 IN

## 2019-12-12 DIAGNOSIS — M50.90 CERVICAL DISC DISORDER, UNSPECIFIED, UNSPECIFIED CERVICAL REGION: ICD-10-CM

## 2019-12-12 DIAGNOSIS — M54.10 RADICULOPATHY, SITE UNSPECIFIED: ICD-10-CM

## 2019-12-12 DIAGNOSIS — I63.9 CEREBRAL INFARCTION, UNSPECIFIED: ICD-10-CM

## 2019-12-12 PROCEDURE — 99214 OFFICE O/P EST MOD 30 MIN: CPT

## 2019-12-12 PROCEDURE — 99213 OFFICE O/P EST LOW 20 MIN: CPT

## 2019-12-12 RX ORDER — CLOPIDOGREL 75 MG/1
75 TABLET, FILM COATED ORAL
Refills: 0 | Status: DISCONTINUED | COMMUNITY
End: 2019-12-12

## 2019-12-12 NOTE — REASON FOR VISIT
[Follow-Up: _____] : a [unfilled] follow-up visit [Spouse] : spouse [FreeTextEntry1] : Bree Doss is a 64yr old female here for a follow up visit. She had endovascular embolization of left middle cerebral artery aneurysm with WEB device 6/21/2019. A few weeks after the procedure she had two small infarcts on MRI brain when she presented with word finding difficulty and right hand weakness. Today she feels well, the word finding difficulty has resolved, occurs occasionally at night when she is tired. The right hand weakness is gone but she complain of numbness in her fingertips and difficulty with fine motor skills. Recentl 11/28/2019 she presented to [facility] with difficulty speaking which lasted a few minutes, NIH stroke score 0 on admission.  MRI brain showed no new infarcts and she was diagnosed with a TIA and discharged home. Today she is here to discuss the MRI results. She denies any recurrent episodes of aphasia since 11/28/2019. \par

## 2019-12-12 NOTE — ASSESSMENT
[FreeTextEntry1] : Impression: 63yr old female s/p endovascular embolization of unruptured left middle cerebral artery aneurysm with WEB 6/21/2019\par \par Plan: \par Reviewed MRI brain which shows no new strokes only the prior small stroke. Discussed with patient this is why she was diagnosed with TIA and not a stroke. No intervention necessary at this time.\par Refer to neurology as well as Dr. Rosales for evaluation of cervical disc impingement causing her right hand numbness and tingling.  \par No need to re start plavix at this time. \par Continue aspirin 325mg daily\par Cerebral Angiogram February 4 2020\par Refer to PT or OT for her right hand symptoms. \par Refer to speech therapy to work on the expressive aphasia. \par Educated patient and  to call our office should the expressive aphasia worsen, we will order MRI brain with DWI and Flair to evaluate for stroke. \par

## 2019-12-12 NOTE — REVIEW OF SYSTEMS
[Numbness] : numbness [Tingling] : tingling [Negative] : Endocrine [de-identified] : word finding difficulty, right hand numbness and tingling

## 2019-12-12 NOTE — PHYSICAL EXAM
[General Appearance - In No Acute Distress] : in no acute distress [General Appearance - Alert] : alert [Person] : oriented to person [Cranial Nerves Optic (II)] : visual acuity intact bilaterally,  pupils equal round and reactive to light [Time] : oriented to time [Place] : oriented to place [Cranial Nerves Oculomotor (III)] : extraocular motion intact [Cranial Nerves Facial (VII)] : face symmetrical [Cranial Nerves Trigeminal (V)] : facial sensation intact symmetrically [Cranial Nerves Vestibulocochlear (VIII)] : hearing was intact bilaterally [Cranial Nerves Glossopharyngeal (IX)] : tongue and palate midline [Cranial Nerves Accessory (XI - Cranial And Spinal)] : head turning and shoulder shrug symmetric [Cranial Nerves Hypoglossal (XII)] : there was no tongue deviation with protrusion [Motor Strength] : muscle strength was normal in all four extremities [Involuntary Movements] : no involuntary movements were seen [] : no respiratory distress [Abnormal Walk] : normal gait [FreeTextEntry5] : mild expressive aphasia present as well as mild slurring of speech

## 2020-01-17 ENCOUNTER — APPOINTMENT (OUTPATIENT)
Dept: NEUROLOGY | Facility: CLINIC | Age: 65
End: 2020-01-17
Payer: COMMERCIAL

## 2020-01-17 VITALS
HEART RATE: 83 BPM | DIASTOLIC BLOOD PRESSURE: 81 MMHG | SYSTOLIC BLOOD PRESSURE: 135 MMHG | HEIGHT: 64 IN | BODY MASS INDEX: 26.63 KG/M2 | WEIGHT: 156 LBS

## 2020-01-17 DIAGNOSIS — I63.412 CEREBRAL INFARCTION DUE TO EMBOLISM OF LEFT MIDDLE CEREBRAL ARTERY: ICD-10-CM

## 2020-01-17 PROCEDURE — 99215 OFFICE O/P EST HI 40 MIN: CPT

## 2020-01-17 RX ORDER — ALBUTEROL SULFATE 90 UG/1
AEROSOL, METERED RESPIRATORY (INHALATION)
Refills: 0 | Status: ACTIVE | COMMUNITY

## 2020-01-17 RX ORDER — AMLODIPINE BESYLATE 5 MG/1
5 TABLET ORAL DAILY
Refills: 0 | Status: ACTIVE | COMMUNITY

## 2020-01-17 RX ORDER — UMECLIDINIUM 62.5 UG/1
62.5 AEROSOL, POWDER ORAL
Refills: 0 | Status: ACTIVE | COMMUNITY

## 2020-01-17 NOTE — DISCUSSION/SUMMARY
[Antithrombotic therapy with ___] : antithrombotic therapy with  [unfilled] [Intensive Blood Pressure Control] : intensive blood pressure control [Lipid Lowering Therapy] : lipid lowering therapy [Patient encouraged to discuss with Primary MD] : I encouraged the patient to discuss these important issues with ~his/her~ primary care doctor [Goals and Counseling] : I have reviewed the goals of stroke risk factor modification. I counseled the patient on measures to reduce stroke risk, including the importance of medication compliance, risk factor control, exercise, healthy diet and avoidance of smoking. I reviewed stroke warning signs and symptoms and appropriate actions to take if such occur. [FreeTextEntry1] : Impression:\par 1. Cerebral embolism with cerebral infarction. Left MCA distribution stroke -  likely mechanism being periprocedural stroke probably related to micro-thrombi being formed over the surface of WEB with distal embolization in the left MCA distribution\par 2. Word finding difficulties etiology likely from recurrent new infarct in left temporal lobe\par 3. Right upper extremity weakness etiology likely cervical spine disease vs late effect of left MCA stroke\par \par Encouraged her to see [facility] for right arm tingling, numbness and neck pain. It is unclear if her symptoms are entirely related to cervical spine disease.\par \par - Patient was evaluated with Dr. Moreira during this appointment who suggests an EMG and referral to general neurology for evaluation of cervical spine concerns - referrals provided \par - Continue with  mg as directed by Dr. Moreira \par - Follow up with opthalmology for routine evaluation \par - Consider starting a headache journal and using an antiglare screen for computer\par - Continue to monitor BP at home and notify PCP/Cardiology for readings >140/90. Advised to maintain a log of BP readings. Educated on medication compliance and BP monitoring to prevent secondary stroke and systemic problems - BP cuff script provided \par - OT and PT referral provided \par - Continue home statin therapy for secondary stroke prevention. Further dose titration and management deferred to PCP/cardiology. Goal LDL <70 - LDL result did not generate while in hospital. advised to follow up with PCP regarding routine lab monitor in setting of known cirrhosis \par - Follow up with PCP for statin management and primary care needs such as regular blood work including monitoring of HbA1c (5.8) and cholesterol profile (goal LDL <70), to modify vascular risk factors as well as appropriate routine malignancy screenings \par -\par - Home sleep study referral given to patient to assess for sleep apnea as a vascular risk factor in setting of snoring and CVA\par - Follow up with oncology as directed for hx of lung CA s/p resection\par - Discussed traveling and the importance of hydration, frequent ambulation and medication compliance with positive understanding \par

## 2020-01-17 NOTE — HISTORY OF PRESENT ILLNESS
[FreeTextEntry1] : Ms. Doss is a 64 year old female PMHx HTN (on ASA 325mg), COPD not on home O2, former smoker with lung cancer (2018) s/p RUL lobectomy with chemo, HCV with liver cirrhosis, incidentally found to have 10mm L MCA aneurysm on CTA 5/2019 s/p web embolization on 6/21/19 who presents today for follow up after L MCA infarct on 7/8/2019.\par \par She continues to have aphasia since the stroke; right hand numbers and intermittent shoulder pain.\par \par HPI:\par 63Y R handed  female who presented with L frontal HA, slurred speech and R hand weakness since 3:30 morning prior to arrival. Patient stated that she woke up and started talking to her cat and realized she was unable to articulate the words. When she tried to feed her cat, her R hand felt very weak and was unable to open the cat food properly. She stated that she started feeling better around 9am. She had a similar episode s/p her recent embolization procedure in June 2019 when she noticed she had some slurred speech, but no weakness or other neurologic deficits. Symptoms self resolved over the next 1-2 days. She had an additional episode of L sided frontal HA with R hand numbness and weakness last Saturday prior to admisison that self resolved as well. Stroke code not called when patient arrived as outside of tPA window. \par \par Workup:\par CT/CTA head and neck 7.7.19\par Status post left MCA bifurcation aneurysm embolization with \par redemonstration of areas of slight decreased attenuation along the left \par posterior frontal lobe involving the pre and postcentral gyrus, in the \par region of previously demonstrated restricted diffusion on MRI of \par 6/22/2019, small foci of developing ischemia not excluded, repeat MRI may \par be obtained for improved assessment as clinically warranted.. There is no \par new intracranial hemorrhage, extra-axial collection, midline shift, \par central herniation, hydrocephalus or transcortical infarct. \par \par No  hemodynamically significant stenosis by NASCET \par criteria. No evidence of vascular dissection.\par \par Status post left MCA bifurcation aneurysm \par embolization with a  stenosis of the distal right MCA branch vasculature. \par Dolichoectatic intracranial circulation may reflect hypertension. Bulbous \par distal right M1 and junction of right M2, suggest CT follow-up to ensure \par stability.\par \par MRI/MRA Head and neck 7.8.19\par Restricted diffusion with hyperintense DWI, T2, and FLAIR signal in the \par left posterior frontal and right parietal pre and postcentral gyrus with \par edema and mass effect consistent with evolving ischemic change without \par hemorrhagic transformation.\par \par Ventricles and sulci are unchanged, demonstration of microvascular \par disease and remote lacunar infarcts. No new large hemorrhage or midline \par shift. Basal cisterns remain patent.\par \par Redemonstration unchanged left MCA bifurcation aneurysm embolization \par causing susceptibly artifact, there are more pronounced stenosis of the \par left M2 MCA branches, and decreased size and number of distal left M3 MCA \par branch vasculature.\par \par Unchanged bulbous right M1 M2 junction, suggest follow up to ensure \par stability, tortuous intracranial circulation likely reflecting \par hypertension.\par \par TTE 7.9.19\par 1. Normal left ventricular systolic function. No segmental\par wall motion abnormalities. Endocardial visualization\par enhanced with intravenous injection of Ultrasonic Enhancing\par Agent (Definity).\par 2. The right ventricle is not well visualized; grossly\par normal right ventricular systolic function.\par \par Abdomen US 7/2019\par Findings compatible with cirrhosis.\par \par MRI 11/28/19\par There is evidence of a new area of abnormal T2 prolongation involving the \par left temporal cortex. No corresponding areas of restricted diffusion are \par identified. This could be compatible with underlying infectious or \par inflammatory process\par \par  On 11/28/19 she presented to ED with resolved word finding difficulties and RUE weakness. Patient stated that she woke up and started talking to her cat and realized she was unable to articulate the words. When she tried to feed her cat, her R hand felt very weak and was unable to open the cat food properly. She stated that she started feeling better around 9am. She had a similar episode s/p her recent embolization procedure in June 2019 when she noticed she had some slurred speech, but no weakness or other neurologic deficits. Symptoms self resolved over the next 1-2 days.  Patient reports several similar episodes over summer 2019.She was evaluated in the ED and dx with a TIA. She report that she experiences right arm weakness at times with no major sensation changes - a CT cervical spine was completed and reviewed by Dr. Moreira who reports some cervical disk disease

## 2020-01-17 NOTE — PHYSICAL EXAM
[General Appearance - Alert] : alert [General Appearance - In No Acute Distress] : in no acute distress [Oriented To Time, Place, And Person] : oriented to person, place, and time [Impaired Insight] : insight and judgment were intact [Affect] : the affect was normal [Person] : oriented to person [Place] : oriented to place [Time] : oriented to time [Short Term Intact] : short term memory intact [Concentration Intact] : normal concentrating ability [Visual Intact] : visual attention was ~T not ~L decreased [Naming Objects] : no difficulty naming common objects [Repeating Phrases] : no difficulty repeating a phrase [Fluency] : fluency intact [Reading] : reading intact [Cranial Nerves Optic (II)] : visual acuity intact bilaterally,  visual fields full to confrontation, pupils equal round and reactive to light [Cranial Nerves Oculomotor (III)] : extraocular motion intact [Cranial Nerves Trigeminal (V)] : facial sensation intact symmetrically [Cranial Nerves Facial (VII)] : face symmetrical [Cranial Nerves Vestibulocochlear (VIII)] : hearing was intact bilaterally [Cranial Nerves Glossopharyngeal (IX)] : tongue and palate midline [Cranial Nerves Accessory (XI - Cranial And Spinal)] : head turning and shoulder shrug symmetric [Cranial Nerves Hypoglossal (XII)] : there was no tongue deviation with protrusion [Motor Tone] : muscle tone was normal in all four extremities [Motor Strength] : muscle strength was normal in all four extremities [No Muscle Atrophy] : normal bulk in all four extremities [Sensation Tactile Decrease] : light touch was intact [Abnormal Walk] : normal gait [Balance] : balance was intact [2+] : Ankle jerk left 2+ [Sclera] : the sclera and conjunctiva were normal [Full Visual Field] : full visual field [Paresis Pronator Drift Right-Sided] : no pronator drift on the right [Paresis Pronator Drift Left-Sided] : no pronator drift on the left [Motor Strength Lower Extremities Bilaterally] : strength was normal in both lower extremities [Motor Strength Upper Extremities Bilaterally] : strength was normal in both upper extremities [Romberg's Sign] : Romberg's sign was negtive [Plantar Reflex Right Only] : normal on the right [Tremor] : no tremor present [Past-pointing] : there was no past-pointing [Plantar Reflex Left Only] : normal on the left [FreeTextEntry6] : 5/5 in all extremities  [FreeTextEntry8] : mild dysmetria on left

## 2020-01-28 LAB — PLATELET RESPONSE ASPIRIN: 571 ARU

## 2020-02-10 ENCOUNTER — APPOINTMENT (OUTPATIENT)
Dept: NEUROSURGERY | Facility: CLINIC | Age: 65
End: 2020-02-10
Payer: COMMERCIAL

## 2020-02-10 VITALS
RESPIRATION RATE: 18 BRPM | DIASTOLIC BLOOD PRESSURE: 87 MMHG | TEMPERATURE: 98 F | WEIGHT: 156 LBS | HEART RATE: 96 BPM | HEIGHT: 64 IN | SYSTOLIC BLOOD PRESSURE: 134 MMHG | OXYGEN SATURATION: 96 % | BODY MASS INDEX: 26.63 KG/M2

## 2020-02-10 PROCEDURE — 99215 OFFICE O/P EST HI 40 MIN: CPT

## 2020-02-10 RX ORDER — GABAPENTIN 300 MG/1
300 CAPSULE ORAL 3 TIMES DAILY
Qty: 90 | Refills: 2 | Status: ACTIVE | COMMUNITY
Start: 2020-02-10 | End: 1900-01-01

## 2020-02-14 ENCOUNTER — OUTPATIENT (OUTPATIENT)
Dept: OUTPATIENT SERVICES | Facility: HOSPITAL | Age: 65
LOS: 1 days | End: 2020-02-14
Payer: COMMERCIAL

## 2020-02-14 VITALS
SYSTOLIC BLOOD PRESSURE: 160 MMHG | DIASTOLIC BLOOD PRESSURE: 90 MMHG | HEART RATE: 96 BPM | RESPIRATION RATE: 16 BRPM | OXYGEN SATURATION: 96 % | WEIGHT: 154.1 LBS | HEIGHT: 64 IN | TEMPERATURE: 98 F

## 2020-02-14 DIAGNOSIS — Z98.890 OTHER SPECIFIED POSTPROCEDURAL STATES: Chronic | ICD-10-CM

## 2020-02-14 DIAGNOSIS — I67.1 CEREBRAL ANEURYSM, NONRUPTURED: ICD-10-CM

## 2020-02-14 DIAGNOSIS — Z90.2 ACQUIRED ABSENCE OF LUNG [PART OF]: Chronic | ICD-10-CM

## 2020-02-14 DIAGNOSIS — Z01.818 ENCOUNTER FOR OTHER PREPROCEDURAL EXAMINATION: ICD-10-CM

## 2020-02-14 DIAGNOSIS — Z87.39 PERSONAL HISTORY OF OTHER DISEASES OF THE MUSCULOSKELETAL SYSTEM AND CONNECTIVE TISSUE: Chronic | ICD-10-CM

## 2020-02-14 DIAGNOSIS — Z90.89 ACQUIRED ABSENCE OF OTHER ORGANS: Chronic | ICD-10-CM

## 2020-02-14 LAB
ALBUMIN SERPL ELPH-MCNC: 4.9 G/DL — SIGNIFICANT CHANGE UP (ref 3.3–5)
ALP SERPL-CCNC: 116 U/L — SIGNIFICANT CHANGE UP (ref 40–120)
ALT FLD-CCNC: 35 U/L — SIGNIFICANT CHANGE UP (ref 10–45)
ANION GAP SERPL CALC-SCNC: 18 MMOL/L — HIGH (ref 5–17)
AST SERPL-CCNC: 27 U/L — SIGNIFICANT CHANGE UP (ref 10–40)
BILIRUB SERPL-MCNC: 0.4 MG/DL — SIGNIFICANT CHANGE UP (ref 0.2–1.2)
BLD GP AB SCN SERPL QL: NEGATIVE — SIGNIFICANT CHANGE UP
BUN SERPL-MCNC: 18 MG/DL — SIGNIFICANT CHANGE UP (ref 7–23)
CALCIUM SERPL-MCNC: 10.7 MG/DL — HIGH (ref 8.4–10.5)
CHLORIDE SERPL-SCNC: 99 MMOL/L — SIGNIFICANT CHANGE UP (ref 96–108)
CO2 SERPL-SCNC: 24 MMOL/L — SIGNIFICANT CHANGE UP (ref 22–31)
CREAT SERPL-MCNC: 0.78 MG/DL — SIGNIFICANT CHANGE UP (ref 0.5–1.3)
GLUCOSE SERPL-MCNC: 109 MG/DL — HIGH (ref 70–99)
HCT VFR BLD CALC: 48.8 % — HIGH (ref 34.5–45)
HGB BLD-MCNC: 15.7 G/DL — HIGH (ref 11.5–15.5)
MCHC RBC-ENTMCNC: 28.8 PG — SIGNIFICANT CHANGE UP (ref 27–34)
MCHC RBC-ENTMCNC: 32.2 GM/DL — SIGNIFICANT CHANGE UP (ref 32–36)
MCV RBC AUTO: 89.5 FL — SIGNIFICANT CHANGE UP (ref 80–100)
NRBC # BLD: 0 /100 WBCS — SIGNIFICANT CHANGE UP (ref 0–0)
PLATELET # BLD AUTO: 311 K/UL — SIGNIFICANT CHANGE UP (ref 150–400)
POTASSIUM SERPL-MCNC: 4.1 MMOL/L — SIGNIFICANT CHANGE UP (ref 3.5–5.3)
POTASSIUM SERPL-SCNC: 4.1 MMOL/L — SIGNIFICANT CHANGE UP (ref 3.5–5.3)
PROT SERPL-MCNC: 8.3 G/DL — SIGNIFICANT CHANGE UP (ref 6–8.3)
RBC # BLD: 5.45 M/UL — HIGH (ref 3.8–5.2)
RBC # FLD: 13 % — SIGNIFICANT CHANGE UP (ref 10.3–14.5)
RH IG SCN BLD-IMP: POSITIVE — SIGNIFICANT CHANGE UP
SODIUM SERPL-SCNC: 141 MMOL/L — SIGNIFICANT CHANGE UP (ref 135–145)
WBC # BLD: 8.26 K/UL — SIGNIFICANT CHANGE UP (ref 3.8–10.5)
WBC # FLD AUTO: 8.26 K/UL — SIGNIFICANT CHANGE UP (ref 3.8–10.5)

## 2020-02-14 PROCEDURE — 86901 BLOOD TYPING SEROLOGIC RH(D): CPT

## 2020-02-14 PROCEDURE — G0463: CPT

## 2020-02-14 PROCEDURE — 85027 COMPLETE CBC AUTOMATED: CPT

## 2020-02-14 PROCEDURE — 86900 BLOOD TYPING SEROLOGIC ABO: CPT

## 2020-02-14 PROCEDURE — 80053 COMPREHEN METABOLIC PANEL: CPT

## 2020-02-14 PROCEDURE — 86850 RBC ANTIBODY SCREEN: CPT

## 2020-02-14 RX ORDER — AMLODIPINE BESYLATE 2.5 MG/1
1 TABLET ORAL
Qty: 0 | Refills: 0 | DISCHARGE

## 2020-02-14 RX ORDER — ALBUTEROL 90 UG/1
2 AEROSOL, METERED ORAL
Qty: 0 | Refills: 0 | DISCHARGE

## 2020-02-14 RX ORDER — TRIAMTERENE/HYDROCHLOROTHIAZID 75 MG-50MG
1 TABLET ORAL
Qty: 0 | Refills: 0 | DISCHARGE

## 2020-02-14 RX ORDER — UMECLIDINIUM 62.5 UG/1
1 AEROSOL, POWDER ORAL
Qty: 0 | Refills: 0 | DISCHARGE

## 2020-02-14 RX ORDER — LEVOTHYROXINE SODIUM 125 MCG
1 TABLET ORAL
Qty: 0 | Refills: 0 | DISCHARGE

## 2020-02-14 RX ORDER — ATORVASTATIN CALCIUM 80 MG/1
1 TABLET, FILM COATED ORAL
Qty: 0 | Refills: 0 | DISCHARGE

## 2020-02-14 RX ORDER — GABAPENTIN 400 MG/1
1 CAPSULE ORAL
Qty: 0 | Refills: 0 | DISCHARGE

## 2020-02-14 NOTE — H&P PST ADULT - NSICDXPROBLEM_GEN_ALL_CORE_FT
PROBLEM DIAGNOSES  Problem: Cerebral aneurysm  Assessment and Plan: cerebral angiogram   PST instructions provided, pateint and son verbalized understanding.   CBC, CMP, T&S collected and sent.   Will continue ASA   Continue medicaitons as prescribed

## 2020-02-14 NOTE — H&P PST ADULT - ACTIVITY
feels SOB with humid weather; since she had her wedge resection strenuous activity will make her SOB able to walk, climb, house work, can take care of self

## 2020-02-14 NOTE — H&P PST ADULT - HISTORY OF PRESENT ILLNESS
64 y/o female with pmhx of HTN, COPD, lung CA s/p RUL lobectomy in 5/2018 with chemo from June-August 2018, HCV treated but now with liver cirrhosis, s/p f/u MRI for metastatic disease which found a 9mm left MCA aneurysm, underwent diagnostic  cerebral angiogram 4/17/19, presents to [facility] for scheduled cerebral embolization with web device on 6/21/19. Denies headache, vision changes,  neuro deficits, no fever, chills, no acute complaints. Accompanied by son .     Patient advised by Dr. Moreira to start Plavix 6 days prior procedure, will continue Aspirin, but increase to 325mg daily. 63 y/o female with pmhx of HTN, COPD, lung CA s/p RUL lobectomy in 5/2018 with chemo from June-August 2018, HCV treated but now with liver cirrhosis, stable,  last year f/u MRI for metastatic disease revealed a 9mm left MCA aneurysm, patient underwent cerebral embolization with web device on 6/21/19. A few weeks after the procedure patient had two small infarcts on MRI brain  with word finding difficulty and right hand weakness. Today she feels well, the word finding difficulty has resolved, occurs occasionally at night when she is tired. The right hand weakness is gone but she complain of numbness in her fingertips and difficulty with fine motor skills, undergoing evaluation by Dr. Sherif Dietrich, started on Gabapentin. Patient presents to PST for scheduled cerebral angiogram on 2/18/2020. Patient denies aphasia, HA, vision changes, neuro deficits, no fever, chills, no acute complaints. Accompanied by son.

## 2020-02-14 NOTE — H&P PST ADULT - NSICDXPASTMEDICALHX_GEN_ALL_CORE_FT
PAST MEDICAL HISTORY:  Cerebral aneurysm left MCA 9mm  s/p cerebral angio 4/2019    COPD with emphysema stable, denies hospitalizations/ ER visits    Hepatitis C was treated, in remission for 12 years. Moderate damage done to Liver.      HTN (hypertension)     Liver cirrhosis stable    Lung cancer s/p chemo last summer, s/p RUL lobectomy 5/2018 at [place] with Dr. Clayton PAST MEDICAL HISTORY:  Cerebral aneurysm left MCA 9mm  s/p cerebral angio 4/2019 and embolizatio with web device 6/2019    Cervical spine disease right arm radiculopathy /pain    COPD with emphysema stable, denies hospitalizations/ ER visits    Hepatitis C was treated, in remission for 12 years. Moderate damage done to Liver.      HTN (hypertension)     Ischemic stroke few small strokes s/p cerebral embo with web placement 6/2019, no residuals , slight occasional word searching    Liver cirrhosis stable    Lung cancer s/p chemo last summer, s/p RUL lobectomy 5/2018 at [place] with Dr. Clayton

## 2020-02-14 NOTE — H&P PST ADULT - MUSCULOSKELETAL COMMENTS
right arm pain , hand weakness , worsening since August 2019, undergoing eval for cervical spine disease

## 2020-02-14 NOTE — H&P PST ADULT - NSICDXPASTSURGICALHX_GEN_ALL_CORE_FT
PAST SURGICAL HISTORY:  H/O ganglion cyst left wrist 24 yr ago    History of eye surgery as a baby , cyst under eyelid    History of liver biopsy 20 yr ago    S/P lobectomy of lung right upper lobe  5/2018    S/P tonsillectomy PAST SURGICAL HISTORY:  H/O ganglion cyst left wrist 24 yr ago    History of eye surgery as a baby , cyst under eyelid    History of liver biopsy 20 yr ago    S/P lobectomy of lung right upper lobe  5/2018    S/P tonsillectomy     Status post coil embolization of cerebral aneurysm diagnostic cerebral angio 4/17/19, followed by cerebral embolization with web device on 6/21/19. Patient denies coiling

## 2020-02-14 NOTE — H&P PST ADULT - OTHER CARE PROVIDERS
Dr. Osorio (pulm) [phone]   Dr. Bray (oncology) [phone] cardiologist Dr. Serra [phone] has appointment on 1/2020 Dr. Osorio (pulm) [phone];   Dr. Bray (oncology) [phone] cardiologist Dr. Serra [phone] had appointment on 1/2020

## 2020-02-17 NOTE — HISTORY OF PRESENT ILLNESS
[de-identified] : \par Ms Doss   is a  64 yrs old RH pleasant lady with  PMHx HTN (on ASA 325mg), COPD not on home O2, former smoker with lung cancer (2018) s/p RUL lobectomy with chemo, HCV with liver cirrhosis,  10mm L MCA aneurysm on CTA 5/2019 , s/p web embolization on 6/21/19 . A few weeks after the procedure she had two small infarcts on MRI brain  ( MCA infarct on 7/8/2019)   when she presented with word finding difficulty and right hand weakness. Word finding issue  got better,  but her right hand numbness and pain along with mild weakness still persists. MRI spine done by  finds significant problem in the  C spine and she is here to discuss that imaging and further management.\par \par Today Ms Doss  present ambulatory for consulting for her neck and arm. Her pain is in back of the neck radiating to the right shoulder and right arm extending up to right thumb and little finger and ring finger, she feels her right hand and fingers are stiff all the time and in case if it hit mildly on a surface , she will have severe pain.She complain of numbness in her fingertips and difficulty with fine motor skills. 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 Many times she uses her left hand to pick things and deliver it to the right hand to do the motor functions.Taking Advil for pain, with relief.  An EMG was scheduled and it will be done within few weeks.

## 2020-02-17 NOTE — REVIEW OF SYSTEMS
[Arm Weakness] : arm weakness [Numbness] : numbness [Negative] : Respiratory [Tingling] : tingling [Hypersensitivity] : hypersensitivity [Limb Pain] : limb pain [FreeTextEntry9] : right arm pain

## 2020-02-17 NOTE — ASSESSMENT
[FreeTextEntry1] : Impression:\par C4-5: Arthritis with mild narrowing of the spinal canal and mild to moderate narrowing of both neural foramen. In C5-C6-C7, Disc bulge and bilateral hypertrophic facet joint changes are seen along with mild narrowing of the spinal canal. and Moderate to severe narrowing of both neural foramen. May benefit from  PT, will follow up and see how she responds to  PT.\par \par Plan:\par  PT neck and arm with modalities and strengthening 2 times/week x 8 weeks \par  CT Cervical spine W/O contrast \par Gabapentin 300 mg I tab TID\par EMG already ordered for March at [address] [place] [address].\par Return in 2 months.

## 2020-02-17 NOTE — RESULTS/DATA
[FreeTextEntry1] : MRI c-spine 11/28/19:  Mild forward kyphotic angle at C56\par C45:  right lateral disc/osteophyte impressing thecal sac with very mild cord deformity but CSF anterior to cord.\par Moderate right and mild left FS\par C56:  broad disc/osteophyte compressing thecal sac.  mild anterior cord deformity, moderate left, moderate to severe right FS\par C67:  broad bulge impressing anterior thecal sac with mild to moderate bilateral FS\par

## 2020-02-17 NOTE — PHYSICAL EXAM
[General Appearance - Alert] : alert [General Appearance - In No Acute Distress] : in no acute distress [General Appearance - Well-Appearing] : healthy appearing [General Appearance - Well Nourished] : well nourished [Oriented To Time, Place, And Person] : oriented to person, place, and time [Impaired Insight] : insight and judgment were intact [Affect] : the affect was normal [Memory Recent] : recent memory was not impaired [Person] : oriented to person [Place] : oriented to place [Time] : oriented to time [Short Term Intact] : short term memory intact [Cranial Nerves Optic (II)] : visual acuity intact bilaterally,  pupils equal round and reactive to light [Cranial Nerves Oculomotor (III)] : extraocular motion intact [Cranial Nerves Trigeminal (V)] : facial sensation intact symmetrically [Cranial Nerves Facial (VII)] : face symmetrical [Cranial Nerves Vestibulocochlear (VIII)] : hearing was intact bilaterally [Cranial Nerves Accessory (XI - Cranial And Spinal)] : head turning and shoulder shrug symmetric [Cranial Nerves Hypoglossal (XII)] : there was no tongue deviation with protrusion [Motor Strength] : muscle strength was normal in all four extremities [Sensation Tactile Decrease] : light touch was intact [Sensation Pain / Temperature Decrease] : pain and temperature was intact [Balance] : balance was intact [Sclera] : the sclera and conjunctiva were normal [5] : C8 finger flexors 5/5 [2+] : Ankle jerk left 2+ [] : the neck was supple [Neck Appearance] : the appearance of the neck was normal [Neck Cervical Mass (___cm)] : no neck mass was observed [Heart Rate And Rhythm] : heart rate was normal and rhythm regular [Involuntary Movements] : no involuntary movements were seen [Outer Ear] : the ears and nose were normal in appearance [Hearing Threshold Finger Rub Not [place]] : hearing was normal [Abnormal Walk] : normal gait [Motor Tone] : muscle strength and tone were normal [Proprioception] : proprioception was intact [Plantar Reflex Left Only] : abnormal on the left [Coordination - Dysmetria Impaired Finger-to-Nose Bilateral] : not present [Dysdiadochokinesia Bilaterally] : not present [Tremor] : no tremor present [___] : absent on the left [Marrero] : Marrero's sign was not demonstrated [___] : absent on the right [FreeTextEntry9] : babinski positive on left

## 2020-02-18 ENCOUNTER — APPOINTMENT (OUTPATIENT)
Dept: NEUROSURGERY | Facility: HOSPITAL | Age: 65
End: 2020-02-18
Payer: COMMERCIAL

## 2020-02-18 ENCOUNTER — OUTPATIENT (OUTPATIENT)
Dept: OUTPATIENT SERVICES | Facility: HOSPITAL | Age: 65
LOS: 1 days | End: 2020-02-18
Payer: COMMERCIAL

## 2020-02-18 DIAGNOSIS — Z87.39 PERSONAL HISTORY OF OTHER DISEASES OF THE MUSCULOSKELETAL SYSTEM AND CONNECTIVE TISSUE: Chronic | ICD-10-CM

## 2020-02-18 DIAGNOSIS — Z98.890 OTHER SPECIFIED POSTPROCEDURAL STATES: Chronic | ICD-10-CM

## 2020-02-18 DIAGNOSIS — Z90.89 ACQUIRED ABSENCE OF OTHER ORGANS: Chronic | ICD-10-CM

## 2020-02-18 DIAGNOSIS — I67.1 CEREBRAL ANEURYSM, NONRUPTURED: ICD-10-CM

## 2020-02-18 DIAGNOSIS — Z90.2 ACQUIRED ABSENCE OF LUNG [PART OF]: Chronic | ICD-10-CM

## 2020-02-18 PROCEDURE — 36223 PLACE CATH CAROTID/INOM ART: CPT

## 2020-02-18 PROCEDURE — C1887: CPT

## 2020-02-18 PROCEDURE — 36224 PLACE CATH CAROTD ART: CPT | Mod: LT

## 2020-02-18 PROCEDURE — C1769: CPT

## 2020-02-18 PROCEDURE — C1894: CPT

## 2020-02-18 RX ORDER — SODIUM CHLORIDE 9 MG/ML
1000 INJECTION INTRAMUSCULAR; INTRAVENOUS; SUBCUTANEOUS
Refills: 0 | Status: DISCONTINUED | OUTPATIENT
Start: 2020-02-18 | End: 2020-03-04

## 2020-02-18 NOTE — CHART NOTE - NSCHARTNOTEFT_GEN_A_CORE
Interventional Neuro- Radiology   Procedure Note PA-C    Procedure: Selective Cerebral Angiography   Pre- Procedure Diagnosis: LMCA aneurysm embolized with WEB June 2019  Post- Procedure Diagnosis: Complete occlusion of the LMCA aneurysm    : Dr. Jaguar Moreira  Resident: Dr. Tiburcio Tillman     Nurse Practitioner: Latrice Pastrana    RN: Fabi    Anesthesia: (MAC) Dixie Arizmendi    Sheath: 5 [place] long    I/Os:  Estimated blood loss: less than 10cc      IV fluids: 300cc     Urine output: Due to void     Contrast Omnipaque 240: 29cc             Vitals: /70        HR  76    Spo2 99%     Preliminary Report:  Using a 5 Fr long sheath to the right groin under MAC sedation via left internal carotid artery a selective cerebral angiography was performed and  demonstrates complete occlusion of LMCA aneurysm. ( Official note to follow).  Patient tolerated procedure well, hemodynamically stable, no change in neurological status compared to baseline.  Results discussed with neurosurgery, patient and patient's  family.  Groin sheath was removed,  manual compression held to hemostasis  for  21 minutes, no active bleeding, no hematoma, quick clot and safeguard balloon dressing applied at 930h.  Patient transferred to Recovery Room

## 2020-02-18 NOTE — CHART NOTE - NSCHARTNOTEFT_GEN_A_CORE
Interventional Neuro Radiology  Pre-Procedure Note PA-C    This is a 64y ____ hand dominant Female      HPI:      Allergies: penicillins (Rash)      PAST MEDICAL & SURGICAL HISTORY:  Cervical spine disease: right arm radiculopathy /pain  Ischemic stroke: few small strokes s/p cerebral embo with web placement 6/2019, no residuals , slight occasional word searching  Cerebral aneurysm: left MCA 9mm  s/p cerebral angio 4/2019 and embolizatio with web device 6/2019  Liver cirrhosis: stable  HTN (hypertension)  Lung cancer: s/p chemo last summer, s/p RUL lobectomy 5/2018 at [place] with Dr. Clayton  Hepatitis C: was treated, in remission for 12 years. Moderate damage done to Liver.    COPD with emphysema: stable, denies hospitalizations/ ER visits  Status post coil embolization of cerebral aneurysm: diagnostic cerebral angio 4/17/19, followed by cerebral embolization with web device on 6/21/19. Patient denies coiling  History of eye surgery: as a baby , cyst under eyelid  H/O ganglion cyst: left wrist 24 yr ago  History of liver biopsy: 20 yr ago  S/P lobectomy of lung: right upper lobe  5/2018  S/P tonsillectomy      Social History:   FAMILY HISTORY:  Current Medications: ASA       Complete Blood Count (02.14.20 @ 08:57)    Nucleated RBC: 0 /100 WBCs    WBC Count: 8.26 K/uL    Hemoglobin: 15.7 g/dL    Hematocrit: 48.8 %    Platelet Count - Automated: 311 K/uL                    Blood Bank:       Assessment/Plan:     This is a 64y  year old right  hand dominant Female  presents with   Patient presents to neuro-IR for selective cerebral angiography.   Procedure, goals, risks, benefits and alternatives  were discussed with patient and patient's family.  All questions were answered.  Risks include but are not limited to stroke, vessel injury, hemorrhage, and or right  groin hematoma.  Patient demonstrates understanding  of all risks involved with this procedure and wishes to continue.   Appropriate  content was obtained from patient and consent is in the patient's chart. Interventional Neuro Radiology  Pre-Procedure Note PA-C    This is a 64 hand dominant Female        Allergies: penicillins (Rash)  cervical spine disease: right arm radiculopathy /pain  Ischemic stroke: few small strokes s/p cerebral embo with web placement 6/2019, no residuals , slight occasional word searching  Cerebral aneurysm: left MCA 9mm  s/p cerebral angio 4/2019 and embolizatio with web device 6/2019  Liver cirrhosis: stable  HTN (hypertension)  Lung cancer: s/p chemo last summer, s/p RUL lobectomy 5/2018 at [place] with Dr. Clayton  Hepatitis C: was treated, in remission for 12 years. Moderate damage done to Liver.    COPD with emphysema: stable, denies hospitalizations/ ER visits  Status post coil embolization of cerebral aneurysm: diagnostic cerebral angio 4/17/19, followed by cerebral embolization with web device on 6/21/19. Patient denies coiling  History of eye surgery: as a baby , cyst under eyelid  H/O ganglion cyst: left wrist 24 yr ago  History of liver biopsy: 20 yr ago  S/P lobectomy of lung: right upper lobe  5/2018  S/P tonsillectomy      Social History:   FAMILY HISTORY:  Current Medications: ASA     Complete Blood Count (02.14.20 @ 08:57)    WBC Count: 8.26 K/uL    Hemoglobin: 15.7 g/dL    Hematocrit: 48.8 %    Platelet Count - Automated: 311 K/uL    Comprehensive Metabolic Panel (02.14.20 @ 08:57)    Sodium, Serum: 141 mmol/L    Potassium, Serum: 4.1 mmol/L    Chloride, Serum: 99 mmol/L    Carbon Dioxide, Serum: 24 mmol/L    Anion Gap, Serum: 18 mmol/L    Blood Urea Nitrogen, Serum: 18 mg/dL    Creatinine, Serum: 0.78 mg/dL    Glucose, Serum: 109 mg/dL    Calcium, Total Serum: 10.7 mg/dL      Blood Bank:     Assessment/Plan:   This is a 64 year old right hand dominant female   Patient presents to neuro-IR for selective cerebral angiography.   Procedure, goals, risks, benefits and alternatives  were discussed with patient and patient's family.  All questions were answered.  Risks include but are not limited to stroke, vessel injury, hemorrhage, and or right  groin hematoma.  Patient demonstrates understanding  of all risks involved with this procedure and wishes to continue.   Appropriate  content was obtained from patient and consent is in the patient's chart.

## 2020-02-18 NOTE — CHART NOTE - NSCHARTNOTEFT_GEN_A_CORE
Interventional Neuro- Radiology   Procedure Note PA-ANUP    Procedure: Selective Cerebral Angiography   Pre- Procedure Diagnosis:  Post- Procedure Diagnosis:    : Dr Jaguar Moreira  Physician Assistant: Alessandra Dailey PA-C    Nurse:                   Fabi  Radiologic Tech:  Anesthesiologist:  Sheath:    I/Os: EBL less than 10cc  IV fluids:     cc     Urine output due to void  Contrast Omnipaque 240            Vitals: BP         HR      Spo2             Preliminary Report:  Using a 5 [place]ian long sheath to the right groin under MAC sedation via left vertebral artery, left internal carotid artery, left external carotid artery, right vertebral artery, right internal carotid artery, right external carotid artery a selective cerebral angiography was performed and demonstrated                       Official note to follow.  Patient tolerated procedure well, hemodynamically stable, no change in neurological status compared to baseline. Results discussed with neuro ICU team, patient and patient's family. Right groin sheath was removed, manual compression held to hemostasis for 20 minutes, no active bleeding, no hematoma, quick clot and safeguard balloon dressing applied at

## 2020-02-20 PROBLEM — M48.9 SPONDYLOPATHY, UNSPECIFIED: Chronic | Status: ACTIVE | Noted: 2020-02-14

## 2020-02-20 PROBLEM — I63.9 CEREBRAL INFARCTION, UNSPECIFIED: Chronic | Status: ACTIVE | Noted: 2020-02-14

## 2020-02-20 PROBLEM — I67.1 CEREBRAL ANEURYSM, NONRUPTURED: Chronic | Status: ACTIVE | Noted: 2019-04-15

## 2020-02-24 DIAGNOSIS — I67.1 CEREBRAL ANEURYSM, NONRUPTURED: ICD-10-CM

## 2020-02-29 ENCOUNTER — OUTPATIENT (OUTPATIENT)
Dept: OUTPATIENT SERVICES | Facility: HOSPITAL | Age: 65
LOS: 1 days | End: 2020-02-29
Payer: COMMERCIAL

## 2020-02-29 ENCOUNTER — APPOINTMENT (OUTPATIENT)
Dept: CT IMAGING | Facility: CLINIC | Age: 65
End: 2020-02-29
Payer: COMMERCIAL

## 2020-02-29 DIAGNOSIS — Z87.39 PERSONAL HISTORY OF OTHER DISEASES OF THE MUSCULOSKELETAL SYSTEM AND CONNECTIVE TISSUE: Chronic | ICD-10-CM

## 2020-02-29 DIAGNOSIS — Z90.89 ACQUIRED ABSENCE OF OTHER ORGANS: Chronic | ICD-10-CM

## 2020-02-29 DIAGNOSIS — M50.90 CERVICAL DISC DISORDER, UNSPECIFIED, UNSPECIFIED CERVICAL REGION: ICD-10-CM

## 2020-02-29 DIAGNOSIS — M54.10 RADICULOPATHY, SITE UNSPECIFIED: ICD-10-CM

## 2020-02-29 DIAGNOSIS — Z00.00 ENCOUNTER FOR GENERAL ADULT MEDICAL EXAMINATION WITHOUT ABNORMAL FINDINGS: ICD-10-CM

## 2020-02-29 DIAGNOSIS — Z98.890 OTHER SPECIFIED POSTPROCEDURAL STATES: Chronic | ICD-10-CM

## 2020-02-29 DIAGNOSIS — Z90.2 ACQUIRED ABSENCE OF LUNG [PART OF]: Chronic | ICD-10-CM

## 2020-02-29 PROCEDURE — 72125 CT NECK SPINE W/O DYE: CPT | Mod: 26

## 2020-02-29 PROCEDURE — 72125 CT NECK SPINE W/O DYE: CPT

## 2020-03-11 ENCOUNTER — APPOINTMENT (OUTPATIENT)
Dept: NEUROSURGERY | Facility: CLINIC | Age: 65
End: 2020-03-11
Payer: COMMERCIAL

## 2020-03-11 VITALS
SYSTOLIC BLOOD PRESSURE: 144 MMHG | TEMPERATURE: 96.9 F | BODY MASS INDEX: 26.63 KG/M2 | HEIGHT: 64 IN | RESPIRATION RATE: 16 BRPM | OXYGEN SATURATION: 93 % | HEART RATE: 104 BPM | DIASTOLIC BLOOD PRESSURE: 86 MMHG | WEIGHT: 156 LBS

## 2020-03-11 PROCEDURE — 99213 OFFICE O/P EST LOW 20 MIN: CPT

## 2020-03-11 NOTE — REVIEW OF SYSTEMS
[Hand Weakness] :  hand weakness [Numbness] : numbness [Tingling] : tingling [Abnormal Sensation] : an abnormal sensation [Negative] : Heme/Lymph [de-identified] : expressive aphasia

## 2020-03-11 NOTE — ASSESSMENT
[FreeTextEntry1] : Impression: 63yr old female s/p endovascular embolization of unruptured left middle cerebral artery aneurysm with WEB 6/21/2019\par \par Plan: \par Follow up cerebral angiogram showed complete occlusion of the aneurysm \par Ok to decrease aspirin to 81mg daily \par MRA brain non con in six months August 2020 then follow up in our office after. \par FOllow up with Dr. Dietrich in regards to the neck pain and hand symptoms. \par Educated patient and  to call our office should the expressive aphasia worsen, we will order MRI brain with DWI and Flair to evaluate for stroke. \par

## 2020-03-11 NOTE — REASON FOR VISIT
[FreeTextEntry1] : Bree Doss is a 64yr old female here for a follow up visit. She had endovascular embolization of left middle cerebral artery aneurysm with WEB device 6/21/2019. A few weeks after the procedure she had two small infarcts on MRI brain when she presented with word finding difficulty and right hand weakness. Today she feels well, the word finding difficulty has improved, but is still present early in the morning and [facility] at [facility]. The right hand weakness is gone but she complains of numbness in her fingertips and difficulty with fine motor skills. More recently she had her follow up cerebral angiogram 2/18/2020 which showed complete occlusion of the left middle cerebral artery aneurysm.

## 2020-03-11 NOTE — PHYSICAL EXAM
[General Appearance - Alert] : alert [General Appearance - In No Acute Distress] : in no acute distress [Person] : oriented to person [Place] : oriented to place [Motor Strength] : muscle strength was normal in all four extremities [Involuntary Movements] : no involuntary movements were seen [] : no respiratory distress [Abnormal Walk] : normal gait

## 2020-03-26 ENCOUNTER — APPOINTMENT (OUTPATIENT)
Dept: NEUROLOGY | Facility: CLINIC | Age: 65
End: 2020-03-26

## 2020-04-13 ENCOUNTER — APPOINTMENT (OUTPATIENT)
Dept: NEUROSURGERY | Facility: CLINIC | Age: 65
End: 2020-04-13

## 2020-07-17 ENCOUNTER — APPOINTMENT (OUTPATIENT)
Dept: NEUROLOGY | Facility: CLINIC | Age: 65
End: 2020-07-17

## 2020-08-06 ENCOUNTER — APPOINTMENT (OUTPATIENT)
Dept: NEUROSURGERY | Facility: CLINIC | Age: 65
End: 2020-08-06

## 2020-09-17 ENCOUNTER — APPOINTMENT (OUTPATIENT)
Dept: NEUROSURGERY | Facility: CLINIC | Age: 65
End: 2020-09-17
Payer: MEDICARE

## 2020-09-17 VITALS
TEMPERATURE: 98.2 F | WEIGHT: 156 LBS | HEART RATE: 87 BPM | HEIGHT: 64 IN | SYSTOLIC BLOOD PRESSURE: 126 MMHG | DIASTOLIC BLOOD PRESSURE: 86 MMHG | OXYGEN SATURATION: 96 % | BODY MASS INDEX: 26.63 KG/M2 | RESPIRATION RATE: 16 BRPM

## 2020-09-17 DIAGNOSIS — I67.1 CEREBRAL ANEURYSM, NONRUPTURED: ICD-10-CM

## 2020-09-17 PROCEDURE — 99213 OFFICE O/P EST LOW 20 MIN: CPT

## 2020-09-17 NOTE — REASON FOR VISIT
[Follow-Up: _____] : a [unfilled] follow-up visit [Spouse] : spouse [FreeTextEntry1] : Bree Doss is a 64yr old female here for a follow up visit. She had endovascular embolization of left middle cerebral artery aneurysm with WEB device 6/21/2019. A few weeks after the procedure she had two small infarcts on MRI brain when she presented with word finding difficulty and right hand weakness. Today she feels well, the word finding difficulty has improved, but is still present when she gets angered at work. More recently she had her follow up MRA brain and is here today to review those results. \par

## 2020-09-17 NOTE — CONSULT LETTER
[Dear  ___] : Dear  [unfilled], [Consult Letter:] : I had the pleasure of evaluating your patient, [unfilled]. [Consult Closing:] : Thank you very much for allowing me to participate in the care of this patient.  If you have any questions, please do not hesitate to contact me. [DrJourdan  ___] : Dr. ALFREDO [FreeTextEntry1] : As you know she is a 65yr old female with a past medical history of COPD, Hep C with moderate cirrhosis, and lung cancer.  She underwent MRI of the brain to evaluate for metastatic disease and it was significant for an unruptured large left middle cerebral artery aneurysm. Her social history is significant for former smoker and her family history consists of parents having a stroke but unsure if subarachnoid hemorrhage was present. She underwent diagnostic cerebral angiography 4/17/2019 which confirmed the presence of a 9mm left middle cerebral artery aneurysm. Due to the size and location of this aneurysm I recommend treatment. On June 21, 2019 she underwent cerebral angiography and embolization of the left middle cerebral artery aneurysm with WEB. She tolerated the procedure well and recovered in the Neurosurgical ICU post operatively.  Follow up MRI showed two small infarcts on DWI when she presented with word finding difficulty and right hand weakness. Follow up angiography showed occlusion of the aneurysm and plavix was discontinued.  Her right hand weakness has resolved and word finding difficulty resolved as well. I recommend she continue aspirin 325mg daily and repeat cerebral angiography February 2021. \par  [FreeTextEntry3] : \par Sincerely,\par \par Jaguar Moreira MD\par Professor of Neurological Surgery and Radiology\par  Department of Neurosurgery\par Director Cerebrovascular Surgery\par [facility] [employer] [facility]\par

## 2020-09-17 NOTE — ASSESSMENT
[FreeTextEntry1] : Impression: 65yr old female s/p endovascular embolization of unruptured left middle cerebral artery aneurysm with WEB 6/21/2019\par \par Plan: \par She has completely recovered from her stroke\par MRA brain shows continued occlusion of the aneurysm \par Continue aspirin 81mg daily\par Final cerebral angiogram in 6months March 2021. Risks and benefits of angiography discussed with patient and family in great detail and they wish to proceed at that time

## 2020-09-17 NOTE — PHYSICAL EXAM
[General Appearance - Alert] : alert [General Appearance - In No Acute Distress] : in no acute distress [Person] : oriented to person [Place] : oriented to place [Time] : oriented to time [Fluency] : fluency intact [Comprehension] : comprehension intact [Motor Strength] : muscle strength was normal in all four extremities [Involuntary Movements] : no involuntary movements were seen [] : no respiratory distress [Abnormal Walk] : normal gait

## 2020-11-06 ENCOUNTER — APPOINTMENT (OUTPATIENT)
Dept: NEUROSURGERY | Facility: CLINIC | Age: 65
End: 2020-11-06
Payer: MEDICARE

## 2020-11-06 VITALS
HEART RATE: 89 BPM | SYSTOLIC BLOOD PRESSURE: 157 MMHG | DIASTOLIC BLOOD PRESSURE: 83 MMHG | OXYGEN SATURATION: 95 % | RESPIRATION RATE: 16 BRPM | WEIGHT: 156 LBS | TEMPERATURE: 97.9 F | HEIGHT: 64 IN | BODY MASS INDEX: 26.63 KG/M2

## 2020-11-06 PROCEDURE — 99214 OFFICE O/P EST MOD 30 MIN: CPT

## 2020-11-06 PROCEDURE — 99072 ADDL SUPL MATRL&STAF TM PHE: CPT

## 2020-11-06 NOTE — REVIEW OF SYSTEMS
[As Noted in HPI] : as noted in HPI [Arm Weakness] : arm weakness [Hand Weakness] :  hand weakness [Numbness] : numbness [Tingling] : tingling [Limb Pain] : limb pain [Negative] : Endocrine

## 2020-11-12 NOTE — ASSESSMENT
[FreeTextEntry1] : \par Impression:\par Pt with right cervical radiculopathy, C6.  EMG  was not done yet. Not participated in any PT sessions.  Pathology is most notable at C56 with R>L foraminal stenosis.  If not improved with PT, may consider posterior foraminotomy vs ACDFI.\par \par Plan:\par  PT neck and arm with modalities and strengthening 2 times/week x 8 weeks \par  MRI  Cervical spine W/O contrast \par  EMG for right arm \par  Return in 2 months

## 2020-11-12 NOTE — PHYSICAL EXAM
[General Appearance - Alert] : alert [General Appearance - In No Acute Distress] : in no acute distress [General Appearance - Well Nourished] : well nourished [General Appearance - Well-Appearing] : healthy appearing [Oriented To Time, Place, And Person] : oriented to person, place, and time [Impaired Insight] : insight and judgment were intact [Affect] : the affect was normal [Memory Recent] : recent memory was not impaired [Person] : oriented to person [Place] : oriented to place [Time] : oriented to time [Short Term Intact] : short term memory intact [I: Normal Smell] : smell intact bilaterally [Cranial Nerves Optic (II)] : visual acuity intact bilaterally,  pupils equal round and reactive to light [Cranial Nerves Oculomotor (III)] : extraocular motion intact [Cranial Nerves Trigeminal (V)] : facial sensation intact symmetrically [Cranial Nerves Facial (VII)] : face symmetrical [Cranial Nerves Accessory (XI - Cranial And Spinal)] : head turning and shoulder shrug symmetric [Motor Tone] : muscle tone was normal in all four extremities [Motor Strength] : muscle strength was normal in all four extremities [Sensation Tactile Decrease] : light touch was intact [Sensation Pain / Temperature Decrease] : pain and temperature was intact [Balance] : balance was intact [Non-ambulatory] : Non-ambulatory [2+] : Triceps left 2+ [1+] : Ankle jerk left 1+ [Sclera] : the sclera and conjunctiva were normal [PERRL With Normal Accommodation] : pupils were equal in size, round, reactive to light, with normal accommodation [Outer Ear] : the ears and nose were normal in appearance [Both Tympanic Membranes Were Examined] : both tympanic membranes were normal [Neck Appearance] : the appearance of the neck was normal [] : no respiratory distress [Respiration, Rhythm And Depth] : normal respiratory rhythm and effort [Apical Impulse] : the apical impulse was normal [Heart Rate And Rhythm] : heart rate was normal and rhythm regular [Arterial Pulses Carotid] : carotid pulses were normal with no bruits [Abnormal Walk] : normal gait [Involuntary Movements] : no involuntary movements were seen [5] : C5 Biceps 5/5 [FreeTextEntry1] : Some catch/release on right.

## 2020-11-12 NOTE — HISTORY OF PRESENT ILLNESS
[FreeTextEntry1] : Ms Doss is a 64 yrs old RH pleasant lady with PMHx HTN (on ASA 325mg), COPD and a former smoker with lung cancer (2018) s/p RUL lobectomy with chemo, HCV with liver cirrhosis, 10mm L MCA aneurysm on CTA 5/2019 , s/p web embolization on 6/21/19 . A few weeks after the procedure she had two small infarcts on MRI brain ( MCA infarct on 7/8/2019) when she presented with word finding difficulty and right hand weakness. She got better over time and is under the care of .  MRI spine done by  finds significant problem in the C spine and she is here to discuss that imaging and further management.She was seen in this office on 2/10/2020 and advised for PT and CT Cspine. \par \par Today Ms Doss present ambulatory for consulting for her neck and right  arm. Her pain start in back of the neck radiating to the right shoulder and right arm extending up to right thumb and little finger and ring finger, she feels her right hand and fingers are stiff all the time and in case if it hit mildly on a surface , she will have severe pain. Most of the pain seems radicular to the right thumb, but also with pain in last 2 digits. She take Advil for pain with relief. She complain of numbness in her fingertips and difficulty with fine motor skills. . Many times she uses her left hand to pick things and deliver it to the right hand to do the motor functions.An EMG is yet to be done.\par

## 2020-11-12 NOTE — RESULTS/DATA
[FreeTextEntry1] : EXAM: CT CERVICAL SPINE \par \par \par PROCEDURE DATE: 02/29/2020 \par \par \par \par INTERPRETATION: HISTORY: Cervical disc disease with neck and left arm pain. \par \par Helical CT imaging of the cervical spine was performed without intravenous \par contrast. Sagittal and coronal reformats were provided. 3-D reformats were \par performed on a separate workstation. \par \par Correlation is made with cervical spine MRI from November 28, 2019. \par \par FINDINGS: \par \par ALIGNMENT: There is redemonstration of mild reversal of the cervical \par lordosis at the level of C5/C6. There is no spondylolisthesis. \par \par VERTEBRAL BODIES AND OSSEOUS STRUCTURES: Vertebral body heights are intact. \par There is no acute fracture. \par \par DISC SPACES: There is mild to moderate disc height loss at C4/C5 and C5/C6. \par \par IMAGED BRAIN: Partially imaged surgical clip is seen within the left M1 \par distribution. \par \par IMAGED NECK SOFT TISSUES: There is a large left apical bulla. Edematous \par changes are also seen at the right lung apex. \par \par The findings at the individual levels are as follows: \par \par C1/2: There is mild arthrosis between the anterior arch of C1 and the dens, \par unchanged. \par \par C2/3: There is no spinal canal or neural foraminal narrowing. \par \par C3/4: There is no spinal canal or neural foraminal narrowing. \par \par C4/5: There is right greater than left uncovertebral hypertrophy. There is \par mild facet arthrosis. Findings results in mild flattening of the ventral \par thecal sac and bilateral neural foraminal narrowing, mild to moderate on the \par right and mild on the left. Findings are grossly unchanged. \par \par C5/6: There is mild diffuse disc osteophyte complex with bilateral \par uncovertebral hypertrophy. Findings result in effacement of the right \par lateral recess and flattening of the ventral thecal sac. There is moderate \par bilateral neural foraminal narrowing. Findings are grossly unchanged. \par \par C6/7: There is mild diffuse disc bulge with posterior osseous ridging. There \par is mild bilateral neural foraminal narrowing. Findings are grossly unchanged. \par \par C7/T1: There is moderate to severe left and mild right facet arthrosis. \par There is no central canal or neural foraminal narrowing. Findings are \par grossly unchanged. \par \par IMPRESSION: \par \par Grossly unchanged cervical spondylosis as outlined above. \par \par \par \par \par \par

## 2020-11-23 NOTE — ED CDU PROVIDER INITIAL DAY NOTE - SKIN NEGATIVE STATEMENT, MLM
[FreeTextEntry1] : Hiatal Hernia: The patient was advised to avoid late-night meals and dietary indiscretions. The patient was advised to avoid fried and fatty foods. The patient was advised to abide by an anti-GERD diet. The patient was given a pamphlet for anti-GERD. The patient and I reviewed the anti-GERD diet at length.  I recommend a trial of Famotidine 40 mg twice a day x 3 months for the symptoms. If the symptoms persist, the patient may require a trial of Reglan.\par Gastritis: The patient has a history of gastritis. The patient is to avoid nonsteroidal anti-inflammatory drugs and aspirin. I recommend a trial of Famotidine 40 mg twice a day x 3 months for the symptoms.\par Duodena Polyps: The patient was found to have duodenal polyps on recent upper endoscopy. The pathology revealed fundic gland polyps. The patient and I discussed the risks of fundic gland polyps. The patient was told him the possibility of increasing size and bleeding secondary to duodenal polyps. The patient was advised to follow up in the office to reassess the duodenal polyps.\par History of Small Bowel Obstruction: The patient was previously hospitalized at the [facility] [place] for partial small bowel obstruction. The patient had blood work and imaging studies to assess the symptoms. I reviewed the hospital records, blood work and imaging studies performed in the hospital. \par History of Partial Small Bowel Obstruction: The patient was previously hospitalized at the [facility] [place] at [place] on December 17, 2019 for abdominal pain secondary to partial small bowel obstruction. The patient was hospitalized for 3 days for the symptoms. The blood work revealed mild anemia. The CAT scan of the abdomen and pelvis with IV contrast performed revealed a normal appendix, colonic diverticulosis without evidence of diverticulitis and no bowel obstruction or grossly thickened bowel wall. The previously noted small bowel obstruction appears to have resolved. The barium small bowel follow through performed on December 18, 2019 revealed no complete bowel obstruction and mild focal dilatation of a mid jejunal loops secondary to ileus or low-grade partial obstruction. The patient was followed by surgery, Dr. Enriquez during the hospitalization. The patient was treated conservatively with NG tube with resolution of the bowel obstruction. The patient was observed for 3 days with resolution of the symptoms and was discharged on December 20, 2019. The CT enterography performed as an outpatient on January 16, 2020 revealed possible ileus versus atrial obstruction with dilatation of jejunal loops. There is a small metallic structure in the lumen of the descending colon that appears to be a hemostatic clip. Also noted was nonspecific 4 mm left lower lobe nodule and multiple indeterminate hypodense lesions in the kidneys bilaterally. The patient was seen by his urologist, Dr. Still for the kidney lesions. He is awaiting for an MRI of the abdomen with and without IV contrast. The patient was followed by surgery, Dr. Enriquez during the hospitalization. The patient was treated conservatively with NG tube with resolution of the bowel obstruction. The patient developed belching after discharge. The patient was observed for 3 days with resolution of the symptoms and was discharged to followup in the office. I reviewed the blood work and imaging studies performed during the hospitalization. The patient had a CT enterography performed as an outpatient on January 16, 2020 revealed a normal appendix, colonic diverticulosis without evidence of diverticulitis and no bowel obstruction or grossly thickened bowel wall. The previously noted small bowel obstruction appears to have resolved. The patient denies any prior surgery but admits to having radiation therapy for Prostate cancer. \par History of Diverticular Bleed: The patient was previously hospitalized at [facility] [place] [facility] [place] with multiple episodes of hematochezia in August 1, 2018. The colonoscopy revealed severe diverticulosis. One active bleeding diverticula was identified and treated with hemoclipping and Epinephrine. The patient tolerated the procedure well. \par Diverticulosis: I recommend a low FOD-MAP diet and avoid seeds. The patient is to avoid Metamucil once a day for fiber supplementation. The patient is to also consider a trial of a probiotic such as Align once a day. \par Cholelithiasis: The recent MRI of the abdomen performed revealed evidence of cholelithiasis.  The patient denies any abdominal pain.  The patient has no evidence suggestive of biliary colic.  The findings are suggestive of asymptomatic gallstones.  The patient and I had a long discussion regarding the findings of the MRI of the abdomen.  There is no need for a surgical evaluation at this time.   The patient and I also discussed the possible complications of gallstone disease that include biliary colic, gallbladder perforation, choledocholithiasis, cholangitis, gallstone ileus, gallstone pancreatitis, et cetera. The patient is aware and agrees to contact the office if symptoms develop.  The patient was advised to go to the emergency room if fever, chills and worsening abdominal pain develops.  If symptoms develop, a surgical evaluation and possible surgery may be required.  \par Follow-up: The patient is to follow-up in the office in 6 months to reassess the symptoms. The patient was told to call the office if any further problems. \par 
no abrasions, no jaundice, no lesions, no pruritis, and no rashes.

## 2021-01-26 ENCOUNTER — APPOINTMENT (OUTPATIENT)
Dept: NEUROLOGY | Facility: CLINIC | Age: 66
End: 2021-01-26
Payer: MEDICARE

## 2021-01-26 PROCEDURE — 95908 NRV CNDJ TST 3-4 STUDIES: CPT

## 2021-01-26 PROCEDURE — 95886 MUSC TEST DONE W/N TEST COMP: CPT

## 2021-01-26 PROCEDURE — 99072 ADDL SUPL MATRL&STAF TM PHE: CPT

## 2021-02-12 NOTE — H&P PST ADULT - NEUROLOGICAL
[FreeTextEntry1] : Available notes, and pertinent labs & imaging in EMR reviewed. Pertinent labs and imaging summarized in HPI.  details… Alert & oriented; no sensory, motor or coordination deficits, normal reflexes

## 2021-07-21 NOTE — PATIENT PROFILE ADULT - NSPROEDAABILITYLEARNOTHER_GEN_A_NUR
Area H Indication Text: Tumors in this location are included in Area H (eyelids, eyebrows, nose, lips, chin, ear, pre-auricular, post-auricular, temple, genitalia, hands, feet, ankles and areola).  Tissue conservation is critical in these anatomic locations. none

## 2022-05-11 NOTE — ED ADULT NURSE NOTE - ED NEURO NIH ASSESS
Received request via: Patient    Was the patient seen in the last year in this department? Yes    Does the patient have an active prescription (recently filled or refills available) for medication(s) requested? No   within defined limits

## 2022-07-11 ENCOUNTER — FORM ENCOUNTER (OUTPATIENT)
Age: 67
End: 2022-07-11

## 2022-07-11 ENCOUNTER — TRANSCRIPTION ENCOUNTER (OUTPATIENT)
Age: 67
End: 2022-07-11

## 2022-07-13 ENCOUNTER — TRANSCRIPTION ENCOUNTER (OUTPATIENT)
Age: 67
End: 2022-07-13

## 2022-07-15 ENCOUNTER — APPOINTMENT (OUTPATIENT)
Dept: DISASTER EMERGENCY | Facility: HOSPITAL | Age: 67
End: 2022-07-15

## 2022-07-15 ENCOUNTER — OUTPATIENT (OUTPATIENT)
Dept: OUTPATIENT SERVICES | Facility: HOSPITAL | Age: 67
LOS: 1 days | End: 2022-07-15

## 2022-07-15 VITALS
OXYGEN SATURATION: 96 % | TEMPERATURE: 98 F | RESPIRATION RATE: 16 BRPM | SYSTOLIC BLOOD PRESSURE: 127 MMHG | HEART RATE: 80 BPM | DIASTOLIC BLOOD PRESSURE: 80 MMHG

## 2022-07-15 VITALS
RESPIRATION RATE: 16 BRPM | HEIGHT: 64 IN | OXYGEN SATURATION: 96 % | WEIGHT: 134.92 LBS | SYSTOLIC BLOOD PRESSURE: 138 MMHG | HEART RATE: 92 BPM | TEMPERATURE: 98 F | DIASTOLIC BLOOD PRESSURE: 73 MMHG

## 2022-07-15 DIAGNOSIS — Z90.2 ACQUIRED ABSENCE OF LUNG [PART OF]: Chronic | ICD-10-CM

## 2022-07-15 DIAGNOSIS — Z87.39 PERSONAL HISTORY OF OTHER DISEASES OF THE MUSCULOSKELETAL SYSTEM AND CONNECTIVE TISSUE: Chronic | ICD-10-CM

## 2022-07-15 DIAGNOSIS — Z98.890 OTHER SPECIFIED POSTPROCEDURAL STATES: Chronic | ICD-10-CM

## 2022-07-15 DIAGNOSIS — U07.1 COVID-19: ICD-10-CM

## 2022-07-15 DIAGNOSIS — Z90.89 ACQUIRED ABSENCE OF OTHER ORGANS: Chronic | ICD-10-CM

## 2022-07-15 RX ORDER — BEBTELOVIMAB 87.5 MG/ML
175 INJECTION, SOLUTION INTRAVENOUS ONCE
Refills: 0 | Status: COMPLETED | OUTPATIENT
Start: 2022-07-15 | End: 2022-07-15

## 2022-07-15 RX ADMIN — BEBTELOVIMAB 175 MILLIGRAM(S): 87.5 INJECTION, SOLUTION INTRAVENOUS at 08:55

## 2022-07-15 NOTE — MONOCLONAL ANTIBODY INFUSION - ASSESSMENT AND PLAN
ASSESSMENT:  Pt is a 66 years old female with--Covid + on 7/11, onset on 7/9 referred by Dr. Christiansen .who presents to infusion center for Monoclonal antibody infusion (Bebtelovimab). Patient is vaccinated and boosted x 1 with Pfizer.  Symptoms/ Criteria: sinus, fever, headache, bodyache  History: Lung cancer, HTN, hyperlipidemia, hypothyroidism    PLAN:  - infusion procedure explained to patient   - Consent for monoclonal antibody infusion obtained   - Risk & benefits discussed/all questions answered  - Bebtelovimab 175mg IVP over 30 seconds  - observe patient for one hour post infusion and discharge home

## 2022-07-15 NOTE — MONOCLONAL ANTIBODY INFUSION - HOME MEDICATIONS
amLODIPine 5 mg oral tablet , 1 tab(s) orally once a day  levothyroxine 88 mcg (0.088 mg) oral tablet , 1 tab(s) orally once a day  atorvastatin 20 mg oral tablet , 1 tab(s) orally once a day (at bedtime)  gabapentin 300 mg oral capsule , 1 cap(s) orally 3 times a day, new medication for right arm radiculopathy   aspirin 325 mg oral tablet , 1 tab(s) orally once a day  ProAir HFA 90 mcg/inh inhalation aerosol , 2 puff(s) inhaled every 4 hours, As Needed  triamterene-hydrochlorothiazide 37.5 mg-25 mg oral tablet , 1 tab(s) orally once a day  Incruse Ellipta 62.5 mcg/inh inhalation powder , 1 puff(s) inhaled once a day  Breo Ellipta 100 mcg-25 mcg/inh inhalation powder , 1 puff(s) inhaled once a day

## 2022-07-15 NOTE — MONOCLONAL ANTIBODY INFUSION - EXAM
CC: Monoclonal Antibody Infusion/COVID 19 Positive  66yFemale with lung cancer, HTN, hyperlipidemia, hypothyroidism    exam/findings:  T(C): 36.7 (07-15-22 @ 08:44), Max: 36.7 (07-15-22 @ 08:44)  HR: 92 (07-15-22 @ 08:44) (92 - 92)  BP: 138/73 (07-15-22 @ 08:44) (138/73 - 138/73)  RR: 16 (07-15-22 @ 08:44) (16 - 16)  SpO2: 96% (07-15-22 @ 08:44) (96% - 96%)      PE:   Appearance: NAD	  HEENT:   Normal oral mucosa,   Lymphatic: No lymphadenopathy  Cardiovascular: Normal S1 S2, No JVD, No murmurs, No edema  Respiratory: Lungs clear to auscultation	  Gastrointestinal:  Soft, Non-tender, + BS	  Skin: warm and dry  Neurologic: Non-focal  Extremities: Normal range of motion

## 2023-01-31 NOTE — REASON FOR VISIT
[Follow-Up: _____] : a [unfilled] follow-up visit [Family Member] : family member [FreeTextEntry1] : stroke  Acitretin Pregnancy And Lactation Text: This medication is Pregnancy Category X and should not be given to women who are pregnant or may become pregnant in the future. This medication is excreted in breast milk.

## 2023-04-12 NOTE — H&P PST ADULT - NEGATIVE ENDOCRINE SYMPTOMS
Chief Complaint/Problem Status:Follow up on     Active Hospital Problems    Diagnosis    • Psychosis (CMD)    • Depression        History: Chart reviewed, progress gathered, case discussed with multidisciplinary team, patient seen, appropriate support/counselling/ therapy provided.    Making progress.  No visual or auditory hallucinations today.  Still endorses paranoia.  Still endorses depression, irritability, anxiety and suicidal thinking but reports improvement in their severity.  Patient cooperating with all treatment.  We discussed likely length of stay and aftercare options.  Patient agreeable to transition to [facility] program.    Medication Side Effects: absent  Sleep:better  Appetite:Fair  Groups:yes    PHQ9:    Last four PHQ 2/9 Test Results  0: Not at all  1: Several days  2: More than half the days  3: Nearly every day      Date 4/8/2023 4/1/2023 3/21/2023 3/16/2023   Adult PHQ 2 Score 2 5 5 2   Adult PHQ 2 Interpretation No further screening needed Further screening needed Further screening needed No further screening needed     Date 4/8/2023 4/1/2023 3/21/2023   Adult PHQ 9 Score 8 15 15   Adult PHQ 9 Interpretation Mild Depression Moderately Severe Depression Moderately Severe Depression          Last four GAD7 Assessments     No flowsheet data found.      CIWA Scores:   No data found.           Examination:     VITALS:  Visit Vitals  /90 (BP Location: LUE - Left upper extremity, Patient Position: Supine)   Pulse 80   Temp 98.1 °F (36.7 °C) (Oral)   Resp 17   Ht 5' 11\" (1.803 m)   Wt 67.2 kg (148 lb 2.4 oz)   SpO2 99%   BMI 20.66 kg/m²       MENTAL STATUS EXAM  General appearance: well-nourished  Grooming: appropriate  Attitude: Cooperative  Speech: fluent and loud  Mood/affect:less anxious  Psychomotor:less fidgety  Thought process: intact  Associations/ Thought content:less paranoid/suspicious  Perceptual disorders/hallucinations:  Improvement in auditory hallucinations  and visual hallucinations  Level of consciousness: alert  Orientation: oriented to person, place, time, and general circumstances  Attention/ Concentration: ability to maintain attention  Fund of knowledge/ Intelligence: average  Memory: no apparent impairments in short term memory and no apparent impairments in long term memory   Insight: good, as evidenced by patient's insight into his own illness  Judgment: fair, as evidenced by engagement in treatment  Suicidal thoughts:better  Homicidal thoughts: no  Language:intact  Gait/Station:normal    Medical Decision Making     Principal Diagnosis:  Psychosis (CMD)    Other Diagnosis/Problem List:   Active Hospital Problems    Diagnosis    • Psychosis (CMD)    • Depression        Impression:   signs of improvement    New problem: as above    Medication changes: see orders    New Labs: see orders    Legal Voluntary    Risk of Assessment for Harm to Self/Suicide risk:low to moderate risk unless hospitalization continues    Risk of Assessment of Harm to Others: mildly to moderately elevated relative risk unless hospitalization continues    Risk of vulnerability: mildly to moderately elevated relative risk unless hospitalization continues    Withdrawal risk: low    Recommended After Care : follow up with out pt psychiatrist and therapist, follow up with case management and Community meetings/ program    Reasons for continued hospitalization: risk of harm to self    Time: 35 minutes, more than 50% time spent in counselling and cooordination of care    Recent Lab study results:     CBC with differential  Recent Labs     04/09/23  0642   WBC 5.7   RBC 4.83      SEG 41   TLYMPH 45   PMON 11   PEOS 2   PBASO 1   ANEUT 2.3   ALYMS 2.6   SAMMI 0.7   AEOS 0.1   ABASO 0.0       Comprehensive metabolic panel  Recent Labs     04/09/23  0642   SODIUM 138   POTASSIUM 4.3   CHLORIDE 107   CO2 30   ANIONGAP 5*   GLUCOSE 97   BUN 19   CREATININE 0.98   CALCIUM 9.5   ALBUMIN 3.4*    BILIRUBIN 0.3   ALKPT 85   AST 80*   GPT 43   GLOB 3.8   AGR 0.9*       Recent Labs     23  0642   GLUCOSE 97       Thyroid function tests  Recent Labs     23  0349   TSH 1.468       POC Breath Alcohol testing result:       Hepatitis serology result:        POC Urine Drug Screen Results  Recent Labs     23  1449   POCAMP Negative   POCBAR Negative   POCBEN Positive   POCBUR Negative   POCCOC Negative   POCMARIJ Negative   POCMETHAMP Negative   POCMETHYLEN Negative   POCOPI Negative   POCMETHA Negative   POCOXY Negative   PCP Negative   TCA Negative   POCTEMP 94   INTERNAL Yes        No results found for: LITHIUM   No results found for: VALP  No results found for: CARBAM    No results found for: CPK  Hemoglobin A1C (%)   Date Value   2023 5.1       LIPID PANEL  Cholesterol (mg/dL)   Date Value   2023 166      HDL (mg/dL)   Date Value   2023 89      Cholesterol/ HDL Ratio (no units)   Date Value   2023 1.9      Triglycerides (mg/dL)   Date Value   2023 81      LDL (mg/dL)   Date Value   2023 61     No results found for this or any previous visit.    Urine Panel  No results for input(s): UOSM, UK, 5UNITR, UCROA, UCL, RAÚL, UKET, USPG, UPROT, UWBC, URBC, UBILI, UPH, UURO, USPG, UBACTR, UTPELC in the last 72 hours.    Invalid input(s): SPGRAVITY    Latest radiology results: EEG - routine    Result Date: 3/20/2023  Impression: ELECTROENCEPHALOGRAM REPORT DATE OF STUDY:  2023 Patient's Name:  Brian Cintron :  1978 MRN:  [id number] Reason for EEG:  Hallucinations and altered mental status Clinical History: This is a 44-year-old male with history of alcohol abuse admitted for alcohol withdrawal at the behavior unit at [facility].  Today he became very agitated and was having worsening of visual hallucinations.  This EEG is done to further characterize his mental status changes. Current Medications: Precedex, Lovenox, folic acid, thiamine. Technical  summary: This EEG was recorded utilizing scalp electrodes applied by international 10-20 system. Additional T1, T2, EOG, and EKG electrodes were also used.  This EEG was reviewed for interpretation on both bipolar and monopolar montages. EEG Description: The patient was sedated.  Most of the EEG recording was recorded in sleep which showed normal sleep architecture with synchronous and symmetric sleep components.  As the patient's EEG background in wakefulness was not recorded, no discernible posterior dominant rhythm was seen.  The reactivity of the background to eye opening and closure could not be ascertained.  Photic stimulation did not evoke any additional changes.  Hyperventilation was not performed. A single lead EKG showed a cardiac rhythm of 66 beats per minute. EEG Interpretation: This is essentially a normal adult EEG recorded during sleep and sedation.  Patient's EEG background during wakefulness could not be recorded. Clinical Correlation: Absence of epileptiform discharges on a routine EEG does not rule out seizures and if seizures remain a clinical suspicion, a prolonged 24-48 hours EEG can be done for further characterization.  A repeat EEG once the sedation is weaned off may be done for further characterization.  Interpreted and dictated by Dr. Shaik Fermin    XR SKULL <4 VW    Result Date: 3/20/2023  Narrative: EXAM:  XR SKULL <4 VW CLINICAL INDICATION:  44 years-old Male, history of AP & lateral view of skull to r/o contraindication for MR COMPARISON:  None available. ___________________    Impression: FINDINGS /IMPRESSION:  *  No metallic foreign body, implant, or aneurysm clip identified. *  Visualized paranasal sinuses are clear without opacification or fluid levels.  Orbital rims are intact. *  Remainder normal.     XR CHEST AP OR PA    Result Date: 3/20/2023  Narrative: EXAM: XR CHEST AP OR PA DATE: 3/20/2023 5:47 PM COMPARISON: Radiographs 12/08/21 and earlier. CT 05/04/21. CLINICAL HISTORY:  R/o contraindication for MRI FINDINGS: The lungs are clear of infiltrate. There are no pleural effusions. The heart size upper normal. Right hilar surgical clips are again noted. The bony structures are unremarkable.     Impression: IMPRESSION: No acute cardiopulmonary disease.    XR Pelvis 1 Or 2 Vw    Result Date: 3/20/2023  Narrative: EXAM: XR PELVIS 1 OR 2 VW DATE: 3/20/2023 5:51 PM COMPARISON: CT 12/09/21. CLINICAL HISTORY: R/o contraindication for MRI FINDINGS: No radiopaque foreign bodies are present. No pathologic ossifications are present. Gastric calcifications are noted within the pelvis. There is a normal bowel gas pattern. The bony structures are unremarkable.     Impression: IMPRESSION: No acute disease.    XR ABDOMEN AP KUB    Result Date: 3/20/2023  Narrative: EXAM: XR ABDOMEN 1 VW KUB SUPINE DATE: 3/20/2023 5:52 PM COMPARISON: CT 12/09/21. CLINICAL HISTORY: R/o contraindication for MRI FINDINGS: Cardiac leads superimpose the chest. No radiopaque foreign bodies are noted. There is a normal bowel gas pattern. No pathologic calcifications are identified. The bony structures are unremarkable.     Impression: IMPRESSION: No acute disease.    MRI  BRAIN W WO CONTRAST    Result Date: 3/20/2023  Narrative: EXAM:  MRI BRAIN W WO CONTRAST CLINICAL INDICATION:  44 years-old Male, history of Seizure, abnormal neuro exam COMPARISON:  CT head 5/4/2021 MRI Field Strength:  1.5 Maria De Jesus CONTRAST: gadobutrol (GADAVIST) injection 7 mL TECHNIQUE:  MRI of the brain is performed utilizing sagittal T1,  coronal T2, axial T1/T2, 3D FLAIR, axial DWI/ADC, as well as post-gadolinium axial 3D T1, and coronal T1WI.  Thin-section coronal T2/T2-FLAIR and T1 inversion recovery sequences acquired through the temporal lobes. FINDINGS:  Mesial temporal/limbic structures: *  Hippocampi/parahippocampal gyri: Normal *  Choroidal fissures:  Normal *  Amygdala:   Normal *  Fornices/Mammillary bodies:  Normal *  Temporal stem white matter:   Normal Focal cortical dysplasia:  Absent Gray matter heterotopia:  Absent Sulcation/gyration abnormalities:  Absent Brain volume: Within normal limits for age.  Ventricles: No hydrocephalus or mass effect/herniation. There is abnormal focally and symmetrically elevated T2 and T2-FLAIR hyperintensity within the globus pallidus bilaterally, with small amounts of susceptibility suggesting chronic hemorrhage or mineralization.  These areas appear relatively well circumscribed and similar to prior CT head exam, without surrounding edema or diffusion restriction. No abnormal signal involving the mammary bodies, hypothalamus or periaqueductal gray matter. Diffusion: No restricted diffusion identified. Susceptibility: Mild symmetric susceptibility within the globus pallidus bilaterally. Flow voids:  Intact Sinuses & Mastoids:  Normal Skull base & orbits:  Normal No pathologic parenchymal or meningeal enhancement. __________    Impression: IMPRESSION:  *  Symmetric bilateral signal abnormality within the globi pallidi as described, consistent with a remote toxic/metabolic insult (such as remote carbon monoxide poisoning), similar to prior CT scan of 2021. *  No structural epileptogenic focus identified. *  No convincing MRI evidence of Wernicke's encephalopathy.       Current Facility-Administered Medications   Medication   • risperiDONE (RisperDAL) tablet 2 mg   • albuterol inhaler 2 puff   • amLODIPine (NORVASC) tablet 7.5 mg   • traZODone (DESYREL) tablet 50 mg   • benztropine mesylate (COGENTIN) 1 MG/ML injection 1 mg    Or   • benztropine (COGENTIN) tablet 1 mg   • hydrOXYzine (ATARAX) tablet 50 mg   • OLANZapine (ZyPREXA ZYDIS) disintegrating tablet 5 mg   • LORazepam (ATIVAN) injection 2 mg    Or   • LORazepam (ATIVAN) tablet 2 mg   • haloperidol lactate (HALDOL) 5 MG/ML short-acting injection 5 mg    Or   • haloperidol (HALDOL) tablet 10 mg   • ibuprofen (MOTRIN) tablet 600 mg    Or   • ibuprofen (MOTRIN) tablet 400  mg    Or   • ibuprofen (MOTRIN) tablet 600 mg    Or   • ibuprofen (MOTRIN) tablet 800 mg   • polyethylene glycol (MIRALAX) packet 17 g   • docusate sodium-sennosides (SENOKOT S) 50-8.6 MG 2 tablet   • bisacodyl (DULCOLAX) suppository 10 mg   • magnesium hydroxide (MILK OF MAGNESIA) 400 MG/5ML suspension 30 mL   • aluminum-magnesium hydroxide-simethicone (MAALOX) 200-200-20 MG/5ML suspension 30 mL   • ondansetron (ZOFRAN ODT) disintegrating tablet 4 mg   • loperamide (IMODIUM) capsule 2 mg   • nicotine polacrilex (NICORETTE) gum 2 mg   • nicotine (NICODERM) 14 MG/24HR patch 1 patch   • sertraline (ZOLOFT) tablet 50 mg      no cold intolerance/no heat intolerance

## 2023-04-13 NOTE — H&P PST ADULT - TOBACCO USE
Former smoker PAST SURGICAL HISTORY:  H/O foot surgery right big toe surgery    H/O knee surgery arthoscopic x4    Hernia, inguinal, bilateral 3 months old    History of appendectomy     History of cholecystectomy     History of knee replacement procedure of right knee BILATERAL

## 2023-11-17 NOTE — DISCHARGE NOTE NURSING/CASE MANAGEMENT/SOCIAL WORK - NSDCPEPT PROEDMA_GEN_ALL_CORE
Yes Oriented to time, place, person, situation Oriented to time, place, person, situation Oriented to time, place, person, situation

## 2024-01-04 ENCOUNTER — NON-APPOINTMENT (OUTPATIENT)
Age: 69
End: 2024-01-04

## 2024-06-27 ENCOUNTER — NON-APPOINTMENT (OUTPATIENT)
Age: 69
End: 2024-06-27

## 2024-07-05 ENCOUNTER — EMERGENCY (EMERGENCY)
Facility: HOSPITAL | Age: 69
LOS: 1 days | Discharge: HOME | End: 2024-07-05
Attending: EMERGENCY MEDICINE
Payer: MEDICARE

## 2024-07-05 VITALS
SYSTOLIC BLOOD PRESSURE: 125 MMHG | HEART RATE: 98 BPM | DIASTOLIC BLOOD PRESSURE: 70 MMHG | OXYGEN SATURATION: 95 % | RESPIRATION RATE: 17 BRPM

## 2024-07-05 VITALS
OXYGEN SATURATION: 99 % | HEART RATE: 113 BPM | TEMPERATURE: 99 F | WEIGHT: 145.95 LBS | DIASTOLIC BLOOD PRESSURE: 85 MMHG | RESPIRATION RATE: 24 BRPM | SYSTOLIC BLOOD PRESSURE: 152 MMHG

## 2024-07-05 DIAGNOSIS — Z98.890 OTHER SPECIFIED POSTPROCEDURAL STATES: Chronic | ICD-10-CM

## 2024-07-05 DIAGNOSIS — Z87.39 PERSONAL HISTORY OF OTHER DISEASES OF THE MUSCULOSKELETAL SYSTEM AND CONNECTIVE TISSUE: Chronic | ICD-10-CM

## 2024-07-05 DIAGNOSIS — Z90.2 ACQUIRED ABSENCE OF LUNG [PART OF]: Chronic | ICD-10-CM

## 2024-07-05 DIAGNOSIS — Z90.89 ACQUIRED ABSENCE OF OTHER ORGANS: Chronic | ICD-10-CM

## 2024-07-05 LAB
ALBUMIN SERPL ELPH-MCNC: 3.7 G/DL — SIGNIFICANT CHANGE UP (ref 3.3–5.2)
ALP SERPL-CCNC: 132 U/L — HIGH (ref 40–120)
ALT FLD-CCNC: 30 U/L — SIGNIFICANT CHANGE UP
ANION GAP SERPL CALC-SCNC: 18 MMOL/L — HIGH (ref 5–17)
AST SERPL-CCNC: 38 U/L — HIGH
BASE EXCESS BLDV CALC-SCNC: 0.8 MMOL/L — SIGNIFICANT CHANGE UP (ref -2–3)
BASOPHILS # BLD AUTO: 0.09 K/UL — SIGNIFICANT CHANGE UP (ref 0–0.2)
BASOPHILS NFR BLD AUTO: 0.8 % — SIGNIFICANT CHANGE UP (ref 0–2)
BILIRUB SERPL-MCNC: 0.6 MG/DL — SIGNIFICANT CHANGE UP (ref 0.4–2)
BUN SERPL-MCNC: 19.6 MG/DL — SIGNIFICANT CHANGE UP (ref 8–20)
CA-I SERPL-SCNC: 1.19 MMOL/L — SIGNIFICANT CHANGE UP (ref 1.15–1.33)
CALCIUM SERPL-MCNC: 10.1 MG/DL — SIGNIFICANT CHANGE UP (ref 8.4–10.5)
CHLORIDE BLDV-SCNC: 103 MMOL/L — SIGNIFICANT CHANGE UP (ref 96–108)
CHLORIDE SERPL-SCNC: 98 MMOL/L — SIGNIFICANT CHANGE UP (ref 96–108)
CO2 SERPL-SCNC: 20 MMOL/L — LOW (ref 22–29)
CREAT SERPL-MCNC: 0.76 MG/DL — SIGNIFICANT CHANGE UP (ref 0.5–1.3)
EGFR: 85 ML/MIN/1.73M2 — SIGNIFICANT CHANGE UP
EOSINOPHIL # BLD AUTO: 1.3 K/UL — HIGH (ref 0–0.5)
EOSINOPHIL NFR BLD AUTO: 11 % — HIGH (ref 0–6)
GAS PNL BLDV: 134 MMOL/L — LOW (ref 136–145)
GAS PNL BLDV: SIGNIFICANT CHANGE UP
GLUCOSE BLDV-MCNC: 116 MG/DL — HIGH (ref 70–99)
GLUCOSE SERPL-MCNC: 110 MG/DL — HIGH (ref 70–99)
HCO3 BLDV-SCNC: 25 MMOL/L — SIGNIFICANT CHANGE UP (ref 22–29)
HCT VFR BLD CALC: 44.9 % — SIGNIFICANT CHANGE UP (ref 34.5–45)
HCT VFR BLDA CALC: 46 % — SIGNIFICANT CHANGE UP
HGB BLD CALC-MCNC: 15.4 G/DL — SIGNIFICANT CHANGE UP (ref 11.7–16.1)
HGB BLD-MCNC: 15.1 G/DL — SIGNIFICANT CHANGE UP (ref 11.5–15.5)
IMM GRANULOCYTES NFR BLD AUTO: 0.8 % — SIGNIFICANT CHANGE UP (ref 0–0.9)
LACTATE BLDV-MCNC: 1.1 MMOL/L — SIGNIFICANT CHANGE UP (ref 0.5–2)
LYMPHOCYTES # BLD AUTO: 1.84 K/UL — SIGNIFICANT CHANGE UP (ref 1–3.3)
LYMPHOCYTES # BLD AUTO: 15.6 % — SIGNIFICANT CHANGE UP (ref 13–44)
MAGNESIUM SERPL-MCNC: 2.3 MG/DL — SIGNIFICANT CHANGE UP (ref 1.6–2.6)
MCHC RBC-ENTMCNC: 28.3 PG — SIGNIFICANT CHANGE UP (ref 27–34)
MCHC RBC-ENTMCNC: 33.6 GM/DL — SIGNIFICANT CHANGE UP (ref 32–36)
MCV RBC AUTO: 84.1 FL — SIGNIFICANT CHANGE UP (ref 80–100)
MONOCYTES # BLD AUTO: 1 K/UL — HIGH (ref 0–0.9)
MONOCYTES NFR BLD AUTO: 8.5 % — SIGNIFICANT CHANGE UP (ref 2–14)
NEUTROPHILS # BLD AUTO: 7.48 K/UL — HIGH (ref 1.8–7.4)
NEUTROPHILS NFR BLD AUTO: 63.3 % — SIGNIFICANT CHANGE UP (ref 43–77)
NT-PROBNP SERPL-SCNC: 62 PG/ML — SIGNIFICANT CHANGE UP (ref 0–300)
PCO2 BLDV: 39 MMHG — SIGNIFICANT CHANGE UP (ref 39–42)
PH BLDV: 7.42 — SIGNIFICANT CHANGE UP (ref 7.32–7.43)
PLATELET # BLD AUTO: 354 K/UL — SIGNIFICANT CHANGE UP (ref 150–400)
PO2 BLDV: 54 MMHG — HIGH (ref 25–45)
POTASSIUM BLDV-SCNC: 4.5 MMOL/L — SIGNIFICANT CHANGE UP (ref 3.5–5.1)
POTASSIUM SERPL-MCNC: 4.5 MMOL/L — SIGNIFICANT CHANGE UP (ref 3.5–5.3)
POTASSIUM SERPL-SCNC: 4.5 MMOL/L — SIGNIFICANT CHANGE UP (ref 3.5–5.3)
PROT SERPL-MCNC: 7.9 G/DL — SIGNIFICANT CHANGE UP (ref 6.6–8.7)
RBC # BLD: 5.34 M/UL — HIGH (ref 3.8–5.2)
RBC # FLD: 13.7 % — SIGNIFICANT CHANGE UP (ref 10.3–14.5)
SAO2 % BLDV: 87 % — SIGNIFICANT CHANGE UP
SODIUM SERPL-SCNC: 136 MMOL/L — SIGNIFICANT CHANGE UP (ref 135–145)
TROPONIN T, HIGH SENSITIVITY RESULT: 7 NG/L — SIGNIFICANT CHANGE UP (ref 0–51)
WBC # BLD: 11.8 K/UL — HIGH (ref 3.8–10.5)
WBC # FLD AUTO: 11.8 K/UL — HIGH (ref 3.8–10.5)

## 2024-07-05 PROCEDURE — 99285 EMERGENCY DEPT VISIT HI MDM: CPT | Mod: 25

## 2024-07-05 PROCEDURE — 71275 CT ANGIOGRAPHY CHEST: CPT | Mod: MC

## 2024-07-05 PROCEDURE — 85014 HEMATOCRIT: CPT

## 2024-07-05 PROCEDURE — 83880 ASSAY OF NATRIURETIC PEPTIDE: CPT

## 2024-07-05 PROCEDURE — 93010 ELECTROCARDIOGRAM REPORT: CPT

## 2024-07-05 PROCEDURE — 85018 HEMOGLOBIN: CPT

## 2024-07-05 PROCEDURE — 82330 ASSAY OF CALCIUM: CPT

## 2024-07-05 PROCEDURE — 83735 ASSAY OF MAGNESIUM: CPT

## 2024-07-05 PROCEDURE — 71045 X-RAY EXAM CHEST 1 VIEW: CPT

## 2024-07-05 PROCEDURE — 84295 ASSAY OF SERUM SODIUM: CPT

## 2024-07-05 PROCEDURE — 82947 ASSAY GLUCOSE BLOOD QUANT: CPT

## 2024-07-05 PROCEDURE — 84132 ASSAY OF SERUM POTASSIUM: CPT

## 2024-07-05 PROCEDURE — 84484 ASSAY OF TROPONIN QUANT: CPT

## 2024-07-05 PROCEDURE — 83605 ASSAY OF LACTIC ACID: CPT

## 2024-07-05 PROCEDURE — 82803 BLOOD GASES ANY COMBINATION: CPT

## 2024-07-05 PROCEDURE — 71275 CT ANGIOGRAPHY CHEST: CPT | Mod: 26,MC

## 2024-07-05 PROCEDURE — 80053 COMPREHEN METABOLIC PANEL: CPT

## 2024-07-05 PROCEDURE — 99285 EMERGENCY DEPT VISIT HI MDM: CPT

## 2024-07-05 PROCEDURE — 82435 ASSAY OF BLOOD CHLORIDE: CPT

## 2024-07-05 PROCEDURE — 94640 AIRWAY INHALATION TREATMENT: CPT

## 2024-07-05 PROCEDURE — 36415 COLL VENOUS BLD VENIPUNCTURE: CPT

## 2024-07-05 PROCEDURE — 93005 ELECTROCARDIOGRAM TRACING: CPT

## 2024-07-05 PROCEDURE — 71045 X-RAY EXAM CHEST 1 VIEW: CPT | Mod: 26

## 2024-07-05 PROCEDURE — 85025 COMPLETE CBC W/AUTO DIFF WBC: CPT

## 2024-07-05 RX ORDER — AZITHROMYCIN 250 MG/1
500 TABLET, FILM COATED ORAL ONCE
Refills: 0 | Status: DISCONTINUED | OUTPATIENT
Start: 2024-07-05 | End: 2024-07-05

## 2024-07-05 RX ORDER — IPRATROPIUM BROMIDE AND ALBUTEROL SULFATE .5; 3 MG/3ML; MG/3ML
3 SOLUTION RESPIRATORY (INHALATION) ONCE
Refills: 0 | Status: COMPLETED | OUTPATIENT
Start: 2024-07-05 | End: 2024-07-05

## 2024-07-05 RX ORDER — DOXYCYCLINE 100 MG/1
1 CAPSULE ORAL
Qty: 10 | Refills: 0
Start: 2024-07-05 | End: 2024-07-09

## 2024-07-05 RX ORDER — DEXAMETHASONE 1 MG/1
10 TABLET ORAL ONCE
Refills: 0 | Status: COMPLETED | OUTPATIENT
Start: 2024-07-05 | End: 2024-07-05

## 2024-07-05 RX ORDER — SODIUM CHLORIDE 0.9 % (FLUSH) 0.9 %
1000 SYRINGE (ML) INJECTION ONCE
Refills: 0 | Status: COMPLETED | OUTPATIENT
Start: 2024-07-05 | End: 2024-07-05

## 2024-07-05 RX ORDER — DOXYCYCLINE 100 MG/1
100 CAPSULE ORAL ONCE
Refills: 0 | Status: COMPLETED | OUTPATIENT
Start: 2024-07-05 | End: 2024-07-05

## 2024-07-05 RX ADMIN — DOXYCYCLINE 100 MILLIGRAM(S): 100 CAPSULE ORAL at 17:14

## 2024-07-05 RX ADMIN — IPRATROPIUM BROMIDE AND ALBUTEROL SULFATE 3 MILLILITER(S): .5; 3 SOLUTION RESPIRATORY (INHALATION) at 15:49

## 2024-07-05 RX ADMIN — Medication 1000 MILLILITER(S): at 17:14

## 2024-07-05 RX ADMIN — IPRATROPIUM BROMIDE AND ALBUTEROL SULFATE 3 MILLILITER(S): .5; 3 SOLUTION RESPIRATORY (INHALATION) at 15:00

## 2024-07-05 RX ADMIN — DEXAMETHASONE 10 MILLIGRAM(S): 1 TABLET ORAL at 15:00

## 2024-07-12 NOTE — ED CDU PROVIDER INITIAL DAY NOTE - NS ED ATTENDING STATEMENT MOD
Post-Op Assessment Note    CV Status:  Stable    Pain management: adequate       Mental Status:  Sleepy and lethargic   Hydration Status:  Stable   PONV Controlled:  None   Airway Patency:  Patent     Post Op Vitals Reviewed: Yes    No anethesia notable event occurred.    Staff: CRNA               BP   119/56   Temp      Pulse  78   Resp      SpO2   93      I have personally performed a face to face diagnostic evaluation on this patient. I have reviewed the ACP note and agree with the history, exam and plan of care, except as noted.

## 2024-09-14 ENCOUNTER — APPOINTMENT (OUTPATIENT)
Dept: RADIOLOGY | Facility: CLINIC | Age: 69
End: 2024-09-14
Payer: MEDICARE

## 2024-09-14 ENCOUNTER — APPOINTMENT (OUTPATIENT)
Dept: MAMMOGRAPHY | Facility: CLINIC | Age: 69
End: 2024-09-14
Payer: MEDICARE

## 2024-09-14 PROCEDURE — 77067 SCR MAMMO BI INCL CAD: CPT

## 2024-09-14 PROCEDURE — 77080 DXA BONE DENSITY AXIAL: CPT

## 2024-09-14 PROCEDURE — 77063 BREAST TOMOSYNTHESIS BI: CPT

## 2024-11-06 ENCOUNTER — NON-APPOINTMENT (OUTPATIENT)
Age: 69
End: 2024-11-06

## 2025-02-10 NOTE — DISCHARGE NOTE PROVIDER - NS AS DC PROVIDER CONTACT Y/N MULTI
Yes